# Patient Record
Sex: FEMALE
[De-identification: names, ages, dates, MRNs, and addresses within clinical notes are randomized per-mention and may not be internally consistent; named-entity substitution may affect disease eponyms.]

---

## 2019-01-18 ENCOUNTER — HOSPITAL ENCOUNTER (OUTPATIENT)
Dept: HOSPITAL 88 - CT | Age: 84
End: 2019-01-18
Attending: INTERNAL MEDICINE
Payer: MEDICARE

## 2019-01-18 DIAGNOSIS — R10.84: Primary | ICD-10-CM

## 2019-01-18 LAB
BUN SERPL-MCNC: 20 MG/DL (ref 7–26)
BUN/CREAT SERPL: 23 (ref 6–25)
EGFRCR SERPLBLD CKD-EPI 2021: > 60 ML/MIN (ref 60–?)

## 2019-01-18 PROCEDURE — 82565 ASSAY OF CREATININE: CPT

## 2019-01-18 PROCEDURE — 84520 ASSAY OF UREA NITROGEN: CPT

## 2019-01-18 PROCEDURE — 36415 COLL VENOUS BLD VENIPUNCTURE: CPT

## 2019-01-18 PROCEDURE — 74177 CT ABD & PELVIS W/CONTRAST: CPT

## 2019-01-18 NOTE — DIAGNOSTIC IMAGING REPORT
EXAMINATION: CT of the abdomen and pelvis with contrast.



TECHNIQUE: 

Spiral CT images of the abdomen and pelvis were performed from the lung bases

to the lesser trochanters after the intravenous administration of 100 cc of

Isovue-370 and the oral administration of water.  Coronal and sagittal

reformatted images were obtained.



COMPARISON:  None.



CLINICAL HISTORY:Abdominal pain. Patient reports history of appendectomy,

cholecystectomy, hysterectomy, and prior intestinal surgery.

     

DISCUSSION:



ABDOMEN/PELVIS:



LOWER THORAX:Patchy opacity in the right middle lobe and lower lobe, partially

visualized, likely representing atelectasis or scar.



HEPATOBILIARY: No focal hepatic lesions.  No intra-or extrahepatic biliary

ductal dilation.  The gallbladder has been removed. 



SPLEEN: No splenomegaly.



PANCREAS: No focal masses or ductal dilatation. 



ADRENALS: No adrenal nodules.



KIDNEYS/URETERS: Bilateral extrarenal pelves. No hydronephrosis or calculi. 4

cm exophytic hypoattenuating lesion projecting from the interpolar left kidney,

average internal attenuation 5-10 Hounsfield units compatible with a simple

cyst. Additional 1.6 cm simple cyst projecting from the lower pole of the left

kidney. Additional subcentimeter hypoattenuating lesions bilaterally, too small

to further characterize though likely to represent additional small cysts.



PELVIC ORGANS/BLADDER: The urinary bladder is collapsed and poorly evaluated.

The uterus is not identified and has presumably been removed. Peripherally

calcified nodular structure in the rectovesical space may represent a calcified

lymph node.



PERITONEUM/RETROPERITONEUM: No ascites. No pneumoperitoneum.



LYMPH NODES: No pelvic sidewall, retroperitoneal, or mesenteric

lymphadenopathy.



VESSELS: Atherosclerotic calcification of the abdominal aorta, major branch

vessels, and iliac arterial systems, with focal aneurysmal dilatation of the

infrarenal abdominal aorta (3.2 cm). Moderate SMA origin stenosis seen on

series 2 image 19. Celiac and NEELAM origins are patent. Portal vein, splenic

vein, and central superior mesenteric vein are patent.



GI TRACT: The large bowel is notable for multiple diverticula scattered along

the course of the sigmoid colon, without wall thickening or adjacent

inflammatory change. The appendix is not identified compatible with prior

appendectomy. There is no small bowel dilatation to suggest obstruction.



BONES AND SOFT TISSUE: No osseous destructive lesions. Degenerative disc

changes and facet arthropathy of the lumbar spine. Status post vertebral plasty

at L3. Age indeterminate moderate compression deformity of T12 with

approximately 50% loss of height and small amount of fragment retropulsion into

the spinal canal.  Postsurgical scar of the anterior abdominal wall. No

additional focal soft tissue abnormalities.



IMPRESSION: 



Age-indeterminate moderate compression deformity of T12 with superior endplate

retropulsion into the spinal canal, without significant spinal canal stenosis.

Correlate for point tenderness.



No acute intra-abdominal or pelvic CT abnormalities.



Large bowel diverticulosis without evidence of diverticulitis.



Atherosclerotic vascular disease with focal mild aneurysmal dilatation of the

infrarenal abdominal aorta (3.2 cm). Moderate SMA origin stenosis with widely

patent celiac and NEELAM origins.



Partially visualized patchy opacity in the right middle lobe may reflect

atelectasis or inflammatory process. CT scan of the chest without contrast

should be considered for further evaluation.



Signed by: Dr. Kimo Hatch M.D. on 1/18/2019 10:03 AM

## 2019-02-07 ENCOUNTER — HOSPITAL ENCOUNTER (OUTPATIENT)
Dept: HOSPITAL 88 - CT | Age: 84
End: 2019-02-07
Attending: INTERNAL MEDICINE
Payer: MEDICARE

## 2019-02-07 DIAGNOSIS — R91.8: Primary | ICD-10-CM

## 2019-02-07 PROCEDURE — 71250 CT THORAX DX C-: CPT

## 2019-02-07 NOTE — DIAGNOSTIC IMAGING REPORT
EXAMINATION: CT scan of the chest  without contrast.



TECHNIQUE: Spiral CT images of the chest were performed from the lung apices to

the level of the adrenal glands.  No intravenous contrast was administered per

referring physician request. Coronal and sagittal reformatted images were

obtained.



COMPARISON: CT abdomen and pelvis 1/18/2019



CLINICAL HISTORY:Right middle lobe opacity identified on CT abdomen and pelvis



DISCUSSION:   ABSENCE OF INTRAVENOUS CONTRAST DECREASES SENSITIVITY FOR

DETECTION OF FOCAL LESIONS AND VASCULAR PATHOLOGY.



LINES/TUBES:  None.



LUNGS AND AIRWAYS: Cluster of small nodules in a Y shaped configuration in the

right middle lobe corresponding to the abnormality identified on the comparison

CT abdomen and pelvis. Superolateral to this, also within the right lower lobe

is an additional cluster of nodular opacities in a tree-in-bud configuration. A

cluster of nodules in a similar configuration laterally within the left upper

lobe just lateral to the minor fissure seen on series 3 image 39. A similar

tubular/nodular opacity anteriorly within the left lower lobe seen on series 3

image 65. Trace groundglass opacity in the dependent portions of the lower

lobes compatible with subsegmental atelectasis. The trachea, mainstem bronchi,

and central lobar bronchi are patent.



PLEURA: No pneumothorax or pleural effusions.  



HEART AND MEDIASTINUM:  Visualized portions of the thyroid gland are normal.

There is borderline ectasia of the ascending thoracic aorta (3.8 cm). Great

vessel origins are normal in configuration, with marked tortuosity of the

central great vessels. Pulmonary outflow tract is of normal caliber. No

pericardial effusion. H   



LYMPH NODES: No axillary, hilar, or mediastinal lymphadenopathy.



ABDOMEN: Visualized portions of the liver, spleen, pancreas, and adrenals are

unremarkable.



BONES AND SOFT TISSUES: No osseous destructive lesions. Osteopenia with

multilevel degenerative disc disease of the thoracic spine. Anterior

compression deformity of T12 is again noted. Healed fracture deformities of

multiple left-sided ribs.



IMPRESSION: 



Clustered nodules in tree-in-bud/Y shaped and tubular configurations involving

the right middle lobe, right upper lobe, and left lower lobe. Differential

considerations include allergic bronchopulmonary aspergillosis (ABPA) as well

as atypical mycobacterial infection. Bronchial atresia or endobronchial

neoplasm is felt to be unlikely given the multifocality. Pulmonary consultation

for possible bronchoscopy is suggested.



Atherosclerotic vascular disease with borderline ectasia of the ascending

thoracic aorta (3.8 cm).



Signed by: Dr. Kimo Hatch M.D. on 2/7/2019 12:34 PM

## 2019-04-05 ENCOUNTER — HOSPITAL ENCOUNTER (OUTPATIENT)
Dept: HOSPITAL 88 - CT | Age: 84
End: 2019-04-05
Attending: INTERNAL MEDICINE
Payer: MEDICARE

## 2019-04-05 DIAGNOSIS — J42: Primary | ICD-10-CM

## 2019-04-05 DIAGNOSIS — K21.9: ICD-10-CM

## 2019-04-05 DIAGNOSIS — J98.4: ICD-10-CM

## 2019-04-05 PROCEDURE — 71250 CT THORAX DX C-: CPT

## 2019-04-08 NOTE — DIAGNOSTIC IMAGING REPORT
EXAM: CT Chest WITHOUT contrast 4/5/2019 4:21 PM

INDICATION:      

^[id number]

^[id number]

^CHRONIC BRONCHITIS

COMPARISON: CT chest 02/07/2019



TECHNIQUE:

Chest was scanned utilizing a multidetector helical scanner from the lung apex

through the level of the adrenal glands without administration of IV contrast.

Absence of intravenous contrast decreases sensitivity for detection of

lymphadenopathy and vascular pathology. Coronal and sagittal reformations were

obtained.  Routine protocol was performed.

           IV CONTRAST: None



COMPLICATIONS: None



RADIATION DOSE:

     Total DLP: 418.3 mGy*cm

     Estimated effective dose: (DLP x 0.015 x size factor) mSv

     CTDIvol has been reviewed. It is below the limits set by the Radiation

Protocol Committee (RPC).



FINDINGS:



LINES/ TUBES: None.



LUNGS AND AIRWAYS:  Persistent few bilateral small multifocal areas of

tree-in-bud and tubular pulmonary nodules, worse in the right middle lobe, and

mild central peribronchial wall thickening without bronchiectasis. No new

consolidations or suspicious pulmonary nodules.     



PLEURA: The pleural spaces are clear.



HEART AND MEDIASTINUM: The thyroid gland is normal.  Round fluid attenuation

structure in the left lower paratracheal region on series 2, image 47 is

unchanged and may reflect fluid in the pericardial recess. No mediastinal,

hilar or axillary lymphadenopathy.  Left atrium enlargement. There is no

pericardial effusion.  Mild coronary artery calcifications.  Borderline size of

the ascending thoracic aorta (3.8 cm). Mild scattered atherosclerotic

calcifications of the thoracic aorta. The main pulmonary artery is mildly

enlarged measuring 3.3 cm and suggestive of mild pulmonary hypertension.



UPPER ABDOMEN: Cholecystectomy. Suboptimally evaluated few small subcortical

left renal cysts.



BONES: Stable T12 compression deformity and  remote posttraumatic deformity of

a few left-sided ribs.



SOFT TISSUES: Unremarkable.



IMPRESSION: 

Stable bilateral few multifocal areas of tree-in-bud and tubular pulmonary

nodules, worse in the right middle lobe, suggestive of bronchopulmonary

aspergillosis (ABPA) or atypical mycobacterial infection. Consider correlation

with bronchoscopy.



No new consolidations or lymphadenopathy in the chest.



Signed by: Dr. Coreen Loza M.D. on 4/8/2019 7:34 AM

## 2019-12-06 ENCOUNTER — HOSPITAL ENCOUNTER (OUTPATIENT)
Dept: HOSPITAL 88 - CT | Age: 84
End: 2019-12-06
Attending: INTERNAL MEDICINE
Payer: MEDICARE

## 2019-12-06 DIAGNOSIS — J42: Primary | ICD-10-CM

## 2019-12-06 DIAGNOSIS — K21.9: ICD-10-CM

## 2019-12-06 DIAGNOSIS — J98.4: ICD-10-CM

## 2019-12-06 PROCEDURE — 71250 CT THORAX DX C-: CPT

## 2019-12-06 NOTE — DIAGNOSTIC IMAGING REPORT
CT of the chest, without contrast, 12/6/2019.    



History: Chronic bronchitis.



Comparison: 5 2019, 2/7/2019.



Technique: Multidetector CT scanning of the chest was performed from the level

of the thoracic inlet to the upper abdomen without IV or oral contrast. 



Dose reduction: The examination was performed according to departmental

dose-optimization program which includes automated exposure control, adjustment

of the mA and/or kV according to patient size and/or use of iterative

reconstruction technique.                            



Findings:

The visualized portions of the thyroid gland are unremarkable.



There is no axillary, mediastinal, or hilar adenopathy



The heart is stably enlarged. There is unchanged fluid density structure in the

lower left peritracheal region which may represent fluid within the pericardial

recess. The ascending thoracic aorta is ectatic measuring 3.8 cm in diameter.



The main pulmonary artery is stably prominent measuring 3.3 cm in diameter and

may be suggestive of underlying pulmonary hypertension.

The trachea and central airways are clear.



Unchanged tree-in-bud opacities are present in the right upper lobe and are

best appreciated on series 3 image 55 and in the right middle lobe which are

best appreciated on series 3 image 64.



No new consolidation or pulmonary nodules are identified.



There is no pleural effusion. There is no pneumothorax.



Limited evaluation of the upper abdomen is no focal hepatic lesions in the

visualized portions of the liver. The patient is status post cholecystectomy.



No acute osseous abnormalities are identified.



IMPRESSION:

1. Unchanged tree-in-bud opacities in the right upper and middle lobe. No new

consolidation or pulmonary nodules.

2. Ectasia of the ascending thoracic aorta, unchanged.

3.Mild prominence of the main pulmonary artery. Correlate with underlying

pulmonary hypertension.



Signed by: Stephen E Dreyer, MD on 12/6/2019 5:40 PM

## 2019-12-17 ENCOUNTER — HOSPITAL ENCOUNTER (OUTPATIENT)
Dept: HOSPITAL 88 - CT | Age: 84
End: 2019-12-17
Attending: INTERNAL MEDICINE
Payer: MEDICARE

## 2019-12-17 DIAGNOSIS — R55: Primary | ICD-10-CM

## 2019-12-17 DIAGNOSIS — Z91.81: ICD-10-CM

## 2019-12-17 DIAGNOSIS — Z79.01: ICD-10-CM

## 2019-12-17 PROCEDURE — 70450 CT HEAD/BRAIN W/O DYE: CPT

## 2019-12-17 NOTE — DIAGNOSTIC IMAGING REPORT
History:Fall, syncope

Comparison studies:None



Technique:

Axial images were obtained from the skull base to the vertex.

Coronal and sagittal images reconstructed from the axial data.

Intravenous contrast: None

Dose modulation, iterative reconstruction, and/or weight based adjustment of

the mA/kV was utilized to reduce the radiation dose to as low as reasonably

achievable.



Findings:



Scalp/skull: 

No abnormalities.



Extra-axial spaces: 

No masses.  No fluid collections.



Brain sulci: Mildly prominent.

Ventricles: Mild compensatory dilatation. No hydrocephalus.



Parenchyma: 

Few hypodensities in the supratentorial white matter are small vessel ischemic

changes. Small chronic lacunar infarct at the left lentiform nucleus. No

masses, hemorrhage, acute or chronic cortical vascular insults.



Sellar/suprasellar region: No abnormalities.

Craniocervical junction: Patent foramen magnum.  No Chiari one malformation.



Incidental findings:

Atherosclerotic calcifications in the carotid siphons .



Bilateral cataract surgery changes



Impression:



No acute abnormalities.



Chronic findings:

1.  Mild generalized volume loss.

2.  Mild supratentorial white matter small vessel ischemic changes.



Signed by: DR Shamar Silva M.D. on 12/17/2019 2:12 PM

## 2019-12-20 ENCOUNTER — HOSPITAL ENCOUNTER (EMERGENCY)
Dept: HOSPITAL 88 - ER | Age: 84
Discharge: HOME | End: 2019-12-20
Payer: MEDICARE

## 2019-12-20 VITALS — BODY MASS INDEX: 22.19 KG/M2 | HEIGHT: 60 IN | WEIGHT: 113 LBS

## 2019-12-20 VITALS — DIASTOLIC BLOOD PRESSURE: 65 MMHG | SYSTOLIC BLOOD PRESSURE: 127 MMHG

## 2019-12-20 DIAGNOSIS — I10: ICD-10-CM

## 2019-12-20 DIAGNOSIS — M19.031: Primary | ICD-10-CM

## 2019-12-20 DIAGNOSIS — I48.91: ICD-10-CM

## 2019-12-20 PROCEDURE — 29125 APPL SHORT ARM SPLINT STATIC: CPT

## 2019-12-20 PROCEDURE — 84550 ASSAY OF BLOOD/URIC ACID: CPT

## 2019-12-20 PROCEDURE — 73110 X-RAY EXAM OF WRIST: CPT

## 2019-12-20 PROCEDURE — 99284 EMERGENCY DEPT VISIT MOD MDM: CPT

## 2019-12-20 PROCEDURE — 36415 COLL VENOUS BLD VENIPUNCTURE: CPT

## 2019-12-20 NOTE — XMS REPORT
Patient Summary Document

                             Created on: 2019



JOCELYNE COLVIN

External Reference #: [id number]

: 1932

Sex: Female



Demographics







                          Address                   [address] [address] 

[place]  [address]

 

                          Home Phone                [phone]

 

                          Preferred Language        Unknown

 

                          Marital Status            Unknown

 

                          Adventist Affiliation     Unknown

 

                          Race                      Unknown

 

                                        Additional Race(s) 

 

 

                          Ethnic Group              Unknown





Author







                          Author                    [facility]

 

                          [facility]

 

                          Address                   Unknown

 

                          Phone                     Unavailable







Care Team Providers







                    Care Team Member Name    Role                Phone

 

                    OSVALDO BURT    Unavailable         Unavailable

 

                    HENRI TAYLOR    Unavailable         Unavailable

 

                    RADHA BURT    Unavailable         Unavailable







Problems

This patient has no known problems.



Allergies, Adverse Reactions, Alerts

This patient has no known allergies or adverse reactions.



Medications

This patient has no known medications.



Encounters







             Start Date/Time    End Date/Time    Encounter Type    Admission Type    Attending [facility]       Care Department     Encounter ID

 

        2019 11:54:00    2019 11:54:00    Outpatient                    [facility]    [facility]     [id number]







Results







           Test Description    Test Time    Test Comments    Text Results    Atomic Results    Result

 Comments

 

                CT BRAIN WO     2019 14:02:00                                                               

                                           [place] [address]    
 Patient Name: JOCELYNE COLVIN                                   MR #: 
[id number]                     : 1932                                  
Age/Sex: 87/F  Acct #: [id number]                              Req #: [id number]
[id number]  Adm Physician:                                                      
Ordered by: OSVALDO BURT MD                            Report #: [id number]   
    Location: CT                                      Room/Bed:                 
   
___________________________________________________________________________________________________
   Procedure: [id number] CT/CT BRAIN WO  Exam Date: 19                     
      Exam Time: 1339                                              REPORT 
STATUS: Signed    History:Fall, syncope   Comparison studies:None      Techniqu
e:   Axial images were obtained from the skull base to the vertex.   Coronal and
sagittal images reconstructed from the axial data.   Intravenous contrast: None 
 Dose modulation, iterative reconstruction, and/or weight based adjustment of   
the mA/kV was utilized to reduce the radiation dose to as low as reasonably   
achievable.      Findings:      Scalp/skull:    No abnormalities.      
Extra-axial spaces:    No masses.  No fluid collections.      Brain sulci: 
Mildly prominent.   Ventricles: Mild compensatory dilatation. No hydrocephalus. 
    Parenchyma:    Few hypodensities in the supratentorial white matter are 
small vessel ischemic   changes. Small chronic lacunar infarct at the left 
lentiform nucleus. No   masses, hemorrhage, acute or chronic cortical vascular 
insults.      Sellar/suprasellar region: No abnormalities.   Craniocervical 
junction: Patent foramen magnum.  No Chiari one malformation.      Incidental 
findings:   Atherosclerotic calcifications in the carotid siphons .      Bilate
ral cataract surgery changes      Impression:      No acute abnormalities.      
Chronic findings:   1.  Mild generalized volume loss.   2.  Mild supratentorial 
white matter small vessel ischemic changes.      Signed by: DR Shamar Silva M.D. on 2019 2:12 PM        Dictated By: SHAMAR LORENZANA MD  
Electronically Signed By: SHAMAR LORENZANA MD on 19 1412  Transcribed 
By: BRINDA on 19 141       COPY TO:   OSVALDO BURT MD              

 

                CT CHEST WO     2019 17:31:00                                                               

                                           [place] [address]    
 Patient Name: JOCELYNE COLVIN                                   MR #: 
[id number]                     : 1932                                  
Age/Sex: 87/F  Acct #: [id number]                              Req #: [id number]
[id number]  Adm Physician:                                                      
Ordered by: JOJO DACOSTA MD                            Report #: [id number]  
     Location: CT                                      Room/Bed:                
    
___________________________________________________________________________________________________
   Procedure: [id number] CT/CT CHEST WO  Exam Date: 19                     
      Exam Time: 1619                                              REPORT 
STATUS: Signed    CT of the chest, without contrast, 2019.          Histo
ry: Chronic bronchitis.      Comparison: 2019, 2019.      Technique: 
Multidetector CT scanning of the chest was performed from the level   of the 
thoracic inlet to the upper abdomen without IV or oral contrast.       Dose 
reduction: The examination was performed according to departmental   dose-
optimization program which includes automated exposure control, adjustment   of 
the mA and/or kV according to patient size and/or use of iterative   
reconstruction technique.                                  Findings:   The 
visualized portions of the thyroid gland are unremarkable.      There is no axil
jose manuel, mediastinal, or hilar adenopathy      The heart is stably enlarged. There 
is unchanged fluid density structure in the   lower left peritracheal region 
which may represent fluid within the pericardial   recess. The ascending 
thoracic aorta is ectatic measuring 3.8 cm in diameter.      The main pulmonary 
artery is stably prominent measuring 3.3 cm in diameter and   may be suggestive 
of underlying pulmonary hypertension.   The trachea and central airways are 
clear.      Unchanged tree-in-bud opacities are present in the right upper lobe 
and are   best appreciated on series 3 image 55 and in the right middle lobe 
which are   best appreciated on series 3 image 64.      No new consolidation or 
pulmonary nodules are identified.      There is no pleural effusion. There is no
pneumothorax.      Limited evaluation of the upper abdomen is no focal hepatic 
lesions in the   visualized portions of the liver. The patient is status post 
cholecystectomy.      No acute osseous abnormalities are identified.      
IMPRESSION:   1. Unchanged tree-in-bud opacities in the right upper and middle 
lobe. No new   consolidation or pulmonary nodules.   2. Ectasia of the ascending
thoracic aorta, unchanged.   3.Mild prominence of the main pulmonary artery. 
Correlate with underlying   pulmonary hypertension.      Signed by: Stephen E Dreyer, MD on 2019 5:40 PM        Dictated By: STEPHEN E DREYER MD  
Electronically Signed By: STEPHEN E DREYER MD on 19  Transcribed By: 
BRINDA on 19       COPY TO:   JOJO DACOSTA MD              

 

                CT CHEST WO     2019 07:25:00                                                               

                                           [place] [address]    
 Patient Name: JOCELYNE COLVIN                                   MR #: 
[id number]                     : 1932                                  
Age/Sex: 86/F  Acct #: [id number]                              Req #: [id number]
[id number]  Adm Physician:                                                      
Ordered by: JOJO TAYLOR MD                            Report #: [id number]  
     Location: CT                                      Room/Bed:                
    
___________________________________________________________________________________________________
   Procedure: [id number] CT/CT CHEST WO  Exam Date: 19                     
      Exam Time: 1643                                              REPORT 
STATUS: Signed    EXAM: CT Chest WITHOUT contrast 2019 4:21 PM   INDICATION:
         [id number]    1643    CHRONIC BRONCHITIS   COMPARISON: CT chest 
2019      TECHNIQUE:   Chest was scanned utilizing a multidetector helical
scanner from the lung apex   through the level of the adrenal glands without 
administration of IV contrast.   Absence of intravenous contrast decreases 
sensitivity for detection of   lymphadenopathy and vascular pathology. Coronal 
and sagittal reformations were   obtained.  Routine protocol was performed.     
        IV CONTRAST: None      COMPLICATIONS: None      RADIATION DOSE:        
Total DLP: 418.3 mGy*cm        Estimated effective dose: (DLP x 0.015 x size 
factor) mSv        CTDIvol has been reviewed. It is below the limits set by the 
Radiation   Protocol Committee (RPC).      FINDINGS:      LINES/ TUBES: None.   
  LUNGS AND AIRWAYS:  Persistent few bilateral small multifocal areas of   
tree-in-bud and tubular pulmonary nodules, worse in the right middle lobe, and  
mild central peribronchial wall thickening without bronchiectasis. No new   
consolidations or suspicious pulmonary nodules.           PLEURA: The pleural 
spaces are clear.      HEART AND MEDIASTINUM: The thyroid gland is normal.  
Round fluid attenuation   structure in the left lower paratracheal region on 
series 2, image 47 is   unchanged and may reflect fluid in the pericardial 
recess. No mediastinal,   hilar or axillary lymphadenopathy.  Left atrium 
enlargement. There is no   pericardial effusion.  Mild coronary artery 
calcifications.  Borderline size of   the ascending thoracic aorta (3.8 cm). 
Mild scattered atherosclerotic   calcifications of the thoracic aorta. The main 
pulmonary artery is mildly   enlarged measuring 3.3 cm and suggestive of mild 
pulmonary hypertension.      UPPER ABDOMEN: Cholecystectomy. Suboptimally 
evaluated few small subcortical   left renal cysts.      BONES: Stable T12 
compression deformity and  remote posttraumatic deformity of   a few left-sided 
ribs.      SOFT TISSUES: Unremarkable.      IMPRESSION:    Stable bilateral few 
multifocal areas of tree-in-bud and tubular pulmonary   nodules, worse in the 
right middle lobe, suggestive of bronchopulmonary   aspergillosis (ABPA) or 
atypical mycobacterial infection. Consider correlation   with bronchoscopy.     
No new consolidations or lymphadenopathy in the chest.      Signed by: Dr. Alicia Willson M.D. on 2019 7:34 AM        Dictated By: ALICIA WILLSON MD  Electronically Signed By: ALICIA WILLSON MD on 
19  Transcribed By: BRINDA on 19       COPY TO:   
JOJO TAYLOR MD, [facility]                 

 

                CT CHEST WO     2019 12:26:00                                                               

                                           [place] [address]    
 Patient Name: JOCELYNE COLVIN                                   MR #: 
[id number]                     : 1932                                  
Age/Sex: 86/F  Acct #: [id number]                              Req #: [id number]
[id number]  Adm Physician:                                                      
Ordered by: RADHA BURT MD                            Report #: [id number]   
    Location: CT                                      Room/Bed:                 
   
___________________________________________________________________________________________________
   Procedure: [id number] CT/CT CHEST WO  Exam Date: 19                     
      Exam Time: 1214                                              REPORT 
STATUS: Signed    EXAMINATION: CT scan of the chest  without contrast.      DG
HNIQUE: Spiral CT images of the chest were performed from the lung apices to   
the level of the adrenal glands.  No intravenous contrast was administered per  
referring physician request. Coronal and sagittal reformatted images were   
obtained.      COMPARISON: CT abdomen and pelvis 2019      CLINICAL 
HISTORY:Right middle lobe opacity identified on CT abdomen and pelvis      
DISCUSSION:   ABSENCE OF INTRAVENOUS CONTRAST DECREASES SENSITIVITY FOR   
DETECTION OF FOCAL LESIONS AND VASCULAR PATHOLOGY.      LINES/TUBES:  None.     
LUNGS AND AIRWAYS: Cluster of small nodules in a Y shaped configuration in the  
right middle lobe corresponding to the abnormality identified on the comparison 
 CT abdomen and pelvis. Superolateral to this, also within the right lower lobe 
 is an additional cluster of nodular opacities in a tree-in-bud configuration. A
  cluster of nodules in a similar configuration laterally within the left upper 
 lobe just lateral to the minor fissure seen on series 3 image 39. A similar   
tubular/nodular opacity anteriorly within the left lower lobe seen on series 3  
image 65. Trace groundglass opacity in the dependent portions of the lower   
lobes compatible with subsegmental atelectasis. The trachea, mainstem bronchi,  
and central lobar bronchi are patent.      PLEURA: No pneumothorax or pleural 
effusions.        HEART AND MEDIASTINUM:  Visualized portions of the thyroid 
gland are normal.   There is borderline ectasia of the ascending thoracic aorta 
(3.8 cm). Great   vessel origins are normal in configuration, with marked 
tortuosity of the   central great vessels. Pulmonary outflow tract is of normal 
caliber. No   pericardial effusion. H         LYMPH NODES: No axillary, hilar, 
or mediastinal lymphadenopathy.      ABDOMEN: Visualized portions of the liver, 
spleen, pancreas, and adrenals are   unremarkable.      BONES AND SOFT TISSUES: 
No osseous destructive lesions. Osteopenia with   multilevel degenerative disc 
disease of the thoracic spine. Anterior   compression deformity of T12 is again 
noted. Healed fracture deformities of   multiple left-sided ribs.      
IMPRESSION:       Clustered nodules in tree-in-bud/Y shaped and tubular 
configurations involving   the right middle lobe, right upper lobe, and left 
lower lobe. Differential   considerations include allergic bronchopulmonary 
aspergillosis (ABPA) as well   as atypical mycobacterial infection. Bronchial a
tresia or endobronchial   neoplasm is felt to be unlikely given the 
multifocality. Pulmonary consultation   for possible bronchoscopy is suggested. 
    Atherosclerotic vascular disease with borderline ectasia of the ascending   
thoracic aorta (3.8 cm).      Signed by: Dr. Gamal Cooney M.D. on 2019 
12:34 PM        Dictated By: GAMAL COONEY MD  Electronically Signed By: GAMAL COONEY MD on 19 1234  Transcribed By: BRINDA on 19 1234       COPY 
TO:   RADHA BURT MD                  

 

                CT ABDOMEN/PELVIS W    2019 09:51:00                                                       

                                                   [place] [address]      Patient Name: JOCELYNE COLVIN                                
  MR #: [id number]                     : 1932                          
        Age/Sex: 86/F  Acct #: [id number]                              Req #: 
[id number]  Adm Physician:                                                      
Ordered by: RADHA BURT MD                            Report #: [id number]   
    Location: CT                                      Room/Bed:                 
   
___________________________________________________________________________________________________
   Procedure: [id number] CT/CT ABDOMEN/PELVIS W  Exam Date: 19             
              Exam Time: 0936                                              
REPORT STATUS: Signed    EXAMINATION: CT of the abdomen and pelvis with contra
st.      TECHNIQUE:    Spiral CT images of the abdomen and pelvis were performed
from the lung bases   to the lesser trochanters after the intravenous 
administration of 100 cc of   Isovue-370 and the oral administration of water.  
Coronal and sagittal   reformatted images were obtained.      COMPARISON:  None.
     CLINICAL HISTORY:Abdominal pain. Patient reports history of appendectomy,  
cholecystectomy, hysterectomy, and prior intestinal surgery.           
DISCUSSION:      ABDOMEN/PELVIS:      LOWER THORAX:Patchy opacity in the right 
middle lobe and lower lobe, partially   visualized, likely representing 
atelectasis or scar.      HEPATOBILIARY: No focal hepatic lesions.  No intra-or 
extrahepatic biliary   ductal dilation.  The gallbladder has been removed.      
SPLEEN: No splenomegaly.      PANCREAS: No focal masses or ductal dilatation.   
   ADRENALS: No adrenal nodules.      KIDNEYS/URETERS: Bilateral extrarenal 
pelves. No hydronephrosis or calculi. 4   cm exophytic hypoattenuating lesion 
projecting from the interpolar left kidney,   average internal attenuation 5-10 
Hounsfield units compatible with a simple   cyst. Additional 1.6 cm simple cyst 
projecting from the lower pole of the left   kidney. Additional subcentimeter 
hypoattenuating lesions bilaterally, too small   to further characterize though 
likely to represent additional small cysts.      PELVIC ORGANS/BLADDER: The 
urinary bladder is collapsed and poorly evaluated.   The uterus is not 
identified and has presumably been removed. Peripherally   calcified nodular 
structure in the rectovesical space may represent a calcified   lymph node.     
PERITONEUM/RETROPERITONEUM: No ascites. No pneumoperitoneum.      LYMPH NODES: 
No pelvic sidewall, retroperitoneal, or mesenteric   lymphadenopathy.      
VESSELS: Atherosclerotic calcification of the abdominal aorta, major branch   
vessels, and iliac arterial systems, with focal aneurysmal dilatation of the   
infrarenal abdominal aorta (3.2 cm). Moderate SMA origin stenosis seen on   
series 2 image 19. Celiac and NEELAM origins are patent. Portal vein, splenic   
vein, and central superior mesenteric vein are patent.      GI TRACT: The large 
bowel is notable for multiple diverticula scattered along   the course of the 
sigmoid colon, without wall thickening or adjacent   inflammatory change. The 
appendix is not identified compatible with prior   appendectomy. There is no 
small bowel dilatation to suggest obstruction.      BONES AND SOFT TISSUE: No 
osseous destructive lesions. Degenerative disc   changes and facet arthropathy 
of the lumbar spine. Status post vertebral plasty   at L3. Age indeterminate 
moderate compression deformity of T12 with   approximately 50% loss of height 
and small amount of fragment retropulsion into   the spinal canal.  Postsurgical
scar of the anterior abdominal wall. No   additional focal soft tissue 
abnormalities.      IMPRESSION:       Age-indeterminate moderate compression 
deformity of T12 with superior endplate   retropulsion into the spinal canal, 
without significant spinal canal stenosis.   Correlate for point tenderness.    
 No acute intra-abdominal or pelvic CT abnormalities.      Large bowel 
diverticulosis without evidence of diverticulitis.      Atherosclerotic vascular
disease with focal mild aneurysmal dilatation of the   infrarenal abdominal 
aorta (3.2 cm). 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
origins.      Partially visualized patchy opacity in the right middle lobe may 
reflect   atelectasis or inflammatory process. CT scan of the chest without 
contrast   should be considered for further evaluation.      Signed by: Dr. Gamal Cooney M.D. on 2019 10:03 AM        Dictated By: GAMAL COONEY MD  
Electronically Signed By: GAMAL COONEY MD on 19 1003  Transcribed By: 
BRINDA on 19 1003       COPY TO:   RADHA BURT MD

## 2019-12-20 NOTE — DIAGNOSTIC IMAGING REPORT
TECHNIQUE: Frontal, oblique, and lateral views of the right wrist.



INDICATION: 87-year-old woman with pain and swelling.



COMPARISON: None.



FINDINGS:

Abnormal osseous structure adjacent to trapezium and second metacarpal.

Otherwise, no acute fractures or dislocations.

Degenerative changes at the first carpometacarpal joint. Other joint spaces are

within normal limits.

Calcifications in the region of the triangular fibrocartilage complex.

Soft tissues are grossly unremarkable.



IMPRESSION:

Abnormal osseous structure adjacent to the trapezium and second metacarpal may

represent an osteophyte or sequela of age-indeterminate trauma.



Degenerative changes at the first carpometacarpal joint.



Signed by: Joselin Lane MD on 12/20/2019 2:12 PM

## 2019-12-20 NOTE — XMS REPORT
Summary of Care

                             Created on: 10/10/2019



JOCELYNE COLVIN

External Reference #: [id number]

: [id number]

Sex: Female



Demographics







                          Address                   [id number] [place] [address] 

[place]  [address]

 

                          Preferred Language        English

 

                          Marital Status            Unknown

 

                          Congregational Affiliation     Unknown

 

                          Race                      Other Race

 

                          Ethnic Group              /Latin





Author







                          Author                    Lan SHAW,                Unknown

 

                          Address                   [facility]



 

                          Phone                     Unavailable







Care Team Providers







                    Care Team Member Name    Role                Phone

 

                    RENZO STEIN M.D.    Unavailable         Unavailable

 

                    WERNER RUIZ MD    Unavailable         Unavailable

 

                    JAVED CHOWDARY,  OSVALDO LAGUNAS    Unavailable         Unavailable

 

                    WERNER REYNA MD    Unavailable         Unavailable

 

                    BOLIVAR CHOWDARY,  ERICK SANTOS    Unavailable         Unavailable

 

                          Unavailable               Unavailable







Functional Status







                    Name                Dates               Details

 

                                        Functional status health issues are not documented

                                                    Status: 









                    Name                Dates               Details

 

                                        Cognitive status health issues are not documented

                                                    Status: 







Problems







                    Name                Dates               Details

 

                                        Bilateral leg edema (782.3, R60.0) 

                                                    Status: Active

 

                                        Varicose veins (454.9, I83.90) 

                                                    Status: Active

 

                                        Coronary artery disease (414.00, I25.10) 

                                                    Status: Active

 

                                        Essential (primary) hypertension (401.9, I10) 

                                                    Status: Active

 

                                        Hyperlipidemia (272.4, E78.5) 

                                                    Status: Active

 

                                        Sinus bradycardia (427.89, R00.1) 

                                                    Status: Active

 

                                        Headache (784.0, R51) 

                                                    Status: Active

 

                                        Dizziness (780.4, R42) 

                                                    Status: Active

 

                                        Paroxysmal atrial fibrillation (427.31, I48.0) 

                                                    Status: Active







Medications







                    Name                Dates               Details

 

                                        Nitroglycerin 0.4 MG Sublingual Tablet Sublingual

DESUELVA LATISHA (1) TABLETA DEBAJO DE LA LENGUA WATSON NECESARIO PARA DOLOR DE PECHO.



 

                                         Quantity: 25









RENZO STEIN M.D. * 





                                         Start : 2018





Active

Metoprolol Succinate ER 25 MG Oral Tablet Extended Release 24 Hour

TOME LATISHA (1) TABLETA(S) POR LA BOCA LATISHA VEZ AL TERRA AL ACOSTARSE.

* 





                           Quantity: 90              Refills: 1







* 





                                         Start : 2015





Active

Flecainide Acetate 100 MG Oral Tablet

TOME LATISHA (1) TABLETA(S) POR LA BOCA DOS VECES AL TERRA.

* 





                           Quantity: 180             Refills: 0









WERNER RUIZ MD * 





                                         Start : 2015





Active

amLODIPine Besylate 5 MG Oral Tablet

TAKE 1 TABLET IN THE AM THEN HALF A TAB IN THE PM

* 





                           Quantity: 90              Refills: 0









WERNER RUIZ MD * 





                                         Start : 2015





Active

Pantoprazole Sodium 40 MG Oral Tablet Delayed Release

TOME LATISHA (1) TABLETA(S) POR LA BOCA DOS VECES AL TERRA.

* 





                           Quantity: 180             Refills: 0









WERNER RUIZ MD * 





                                         Start : 14-Oct-2015





Active

Furosemide 40 MG Oral Tablet

TOME LATISHA (1) TABLETA(S) POR LA BOCA LATISHA VEZ AL TERRA EN LA MANANA. AS NEEDED

* 





                           Quantity: 90              Refills: 1







* 





                                         Start : 2016





Active

Compression Stocking

15-20 MMHD bilateral knee high

* 





                           Quantity: 30              Refills: 1









RENZO STEIN M.D. * 





                                         Start : 15-Elio-2016





Active

Jantoven 2 MG Oral Tablet

TOME LATISHA (1) TABLETA(S) POR LA BOCA DIARIO.

* 





                           Quantity: 90              Refills: 1









WERNER RUIZ MD * 





                                         Start : 7-Sep-2016





Active

Losartan Potassium 50 MG Oral Tablet

TOME LATISHA (1) TABLETA(S) POR LA BOCA DOS VECES AL TERRA.

* 





                           Quantity: 180             Refills: 2









WERNER RUIZ MD * 





                                         Start : 2018





Active

Atorvastatin Calcium 40 MG Oral Tablet

TAKE 1 TABLET BEDTIME

* 





                           Quantity: 90              Refills: 2









WERNER RUIZ MD * 





                                         Start : 23-Aug-2019





Active

Aspirin EC 81 MG Oral Tablet Delayed Release

TAKE 1 TABLET DAILY.

* 





                                         Refills: 0







Active





Allergies and Adverse Reactions







                    Name                Dates               Details

 

                    No Known Drug Allergies (Allergy)                        Status: Active









Past Medical History







                    Name                Dates               Details

 

                                        History of Atherosclerosis (440.9, I70.90) 

                                                    Status: Resolved

 

                                        History of Atrial fibrillation (427.31, I48.91) 

                                                    Status: Resolved

 

                                        History of Coronary Artery Disease (V12.59) 

                                                    Status: Resolved

 

                                        History of esophageal reflux (V12.79, Z87.19) 

                                                    Status: Resolved

 

                                        History of essential hypertension (V12.59, Z86.79) 

                                                    Status: Resolved

 

                                        History of hyperlipidemia (V12.29, Z86.39) 

                                                    Status: Resolved

 

                                        History of Osteoporosis (733.00, M81.0) 

                                                    Status: Resolved







Procedures







                    Procedure           Dates               Details

 

                    History of Hysterectomy                        Completed 



 

                    History of Cataract Surgery                        Completed 



 

                    History of Appendectomy                        Completed 



 

                    History of Cholecystectomy                        Completed 



 

                    History of Shoulder Surgery                        Completed 









Immunization







                    Name                Dates               Details

 

                                        Immunizations not documented

                                                     







Family History







                    Name                Dates               Details

 

                                        Family history of Heart Disease (V17.49) 

                                                    Status: Active

 

                                        Family history of Hypertension (V17.49) 

                                                    Status: Active







Social History







                    Name                Dates               Details

 

                                        -

                                                    Status: 









                    Name                Dates               Details

 

                    Former smoker                            

 

                    Current every day smoker                         







Vital Signs







                Date            Test            Result          Details

 

                                                                 

 

                                        20-Sep-201910:27

                    BP Systolic         179 mm[Hg]          Status: Comments: Location: RUE; Position: Sitting



 

                    BP Diastolic        81 mm[Hg]           Status: Comments: Location: RUE; Position: Sitting



 

                                                                 

 

                                        34-Fef-[id number]:21

                    BP Systolic         170 mm[Hg]          Status: Comments: Location: LUE; Position: Sitting



 

                    BP Diastolic        79 mm[Hg]           Status: Comments: Location: LUE; Position: Sitting



 

                    Height              60 in               Status: 



 

                    Weight              136 lb              Status: 



 

                    Body Mass Index Calculated    26.56 kg/m2         Status: 



 

                    Body Surface Area Calculated    1.58 m2             Status: 



 

                    Heart Rate          60 /min             Status: 









Results







                Date            Description     Value           Details

 

                    20-Sep-201913:30    [[facility]] PROTHROMBIN TIME-INR     Comments: Reference Range        

             0.9-1.1Moderate-intensity Warfarin Therapy 2.0-3.0Higher-intensity 
Warfarin Therapy   3.0-4.0  REPORT COMMENT:FASTING:NO



 

                                INR             2.4   (Above high threshold)    Comments: Reference Range                 

    0.9-1.1Moderate-intensity Warfarin Therapy 2.0-3.0Higher-intensity Warfarin 
Therapy   3.0-4.0



 

                                PT              23.7  {sec} (Above high threshold)    Range: 9.0-11.5

Comments: For more information on this test, go 
to:http://education.[employer].Ide[employer].me/faq/MBB653









Plan of Care







                    Name                Dates               Details

 

                                        Planned Observations 

 

                    Planned Goals not documented                         

 

                                        Planned Encounters 

 

                                        Appointment; WERNER RUIZ MD

                                        On: 18-Oct-2019 11:20









Interventions Provided

Medication Changes* Flecainide Acetate 100 MG Oral Tablet - Renew





Instructions







                    Name                Dates               Details

 

                                        Instructions not documented

                                                     







Encounters







                                        Appointment; RENZO STEIN M.D. 

Encounter Diagnosis: Problem not documented

                                        On: 28-Dec-2017 9:10



 

                                        Appointment; RENZO STEIN M.D. 

Encounter Diagnosis: Problem not documented

                                        On: 28-Dec-2017 9:15



 

                                        Appointment; RENZO STEIN M.D. 

Encounter Diagnosis: Problem not documented

                                        On: 2018 9:40



 

                                        Appointment; SE, ECHO 

Encounter Diagnosis: Problem not documented

                                        On: 2018 13:00



 

                                        Appointment; RENZO STEIN M.D. 

Encounter Diagnosis: Problem not documented

                                        On: 15-Feb-2018 13:20



 

                                        Appointment; SE, NUCLEAR 

Encounter Diagnosis: Problem not documented

                                        On: 20-Mar-2018 8:15



 

                                        Appointment; RENZO STEIN M.D. 

Encounter Diagnosis: Problem not documented

                                        On: 2018 11:00



 

                                        Appointment; RENZO STEIN M.D. 

Encounter Diagnosis: Problem not documented

                                        On: 5-Oct-2018 10:15



 

                                        Appointment; RENZO STEIN M.D. 

Encounter Diagnosis: Problem not documented

                                        On: 2019 14:00



 

                                        Appointment; SE, VENOUS 

Encounter Diagnosis: Problem not documented

                                        On: 2019 14:00



 

                                        Appointment; RENZO STEIN M.D. 

Encounter Diagnosis: Problem not documented

                                        On: 2019 8:30



 

                                        Appointment; WERNER RUIZ MD 

Encounter Diagnosis: Problem not documented

                                        On: 24-May-2019 9:30



 

                                        Appointment; WERNER RUIZ MD 

Encounter Diagnosis: Problem not documented

                                        On: 24-May-2019 9:40



 

                                        Appointment; SE, ECHO 

Encounter Diagnosis: Problem not documented

                                        On: 24-May-2019 11:00



 

                                        Appointment; MARICRUZ RAMIREZ 

Encounter Diagnosis: Problem not documented

                                        On: 2019 14:15



 

                                        Appointment; WERNER RUIZ MD 

Encounter Diagnosis: Problem not documented

                                        On: 23-Aug-2019 10:40



 

                                        Appointment; SE, ECHO 

Encounter Diagnosis: Problem not documented

                                        On: 27-Aug-2019 11:00



 

                                        Appointment; WERNER RUIZ MD 

Encounter Diagnosis: Problem not documented

                                        On: 20-Sep-2019 10:20

## 2020-06-29 ENCOUNTER — HOSPITAL ENCOUNTER (OUTPATIENT)
Dept: HOSPITAL 88 - RESP | Age: 85
End: 2020-06-29
Attending: INTERNAL MEDICINE
Payer: MEDICARE

## 2020-06-29 DIAGNOSIS — J42: ICD-10-CM

## 2020-06-29 DIAGNOSIS — K21.9: ICD-10-CM

## 2020-06-29 DIAGNOSIS — J98.4: ICD-10-CM

## 2020-06-29 DIAGNOSIS — R06.09: Primary | ICD-10-CM

## 2020-06-29 PROCEDURE — 94729 DIFFUSING CAPACITY: CPT

## 2020-06-29 PROCEDURE — 94060 EVALUATION OF WHEEZING: CPT

## 2020-06-29 PROCEDURE — 94727 GAS DIL/WSHOT DETER LNG VOL: CPT

## 2020-11-15 ENCOUNTER — HOSPITAL ENCOUNTER (EMERGENCY)
Dept: HOSPITAL 88 - ER | Age: 85
Discharge: HOME | End: 2020-11-15
Payer: MEDICARE

## 2020-11-15 VITALS — HEIGHT: 60 IN | BODY MASS INDEX: 22.19 KG/M2 | WEIGHT: 113 LBS

## 2020-11-15 DIAGNOSIS — I10: ICD-10-CM

## 2020-11-15 DIAGNOSIS — R53.83: ICD-10-CM

## 2020-11-15 DIAGNOSIS — Z95.5: ICD-10-CM

## 2020-11-15 DIAGNOSIS — G47.00: Primary | ICD-10-CM

## 2020-11-15 DIAGNOSIS — I48.91: ICD-10-CM

## 2020-11-15 LAB
ALBUMIN SERPL-MCNC: 3.5 G/DL (ref 3.5–5)
ALBUMIN/GLOB SERPL: 1 {RATIO} (ref 0.8–2)
ALP SERPL-CCNC: 72 IU/L (ref 40–150)
ALT SERPL-CCNC: 29 IU/L (ref 0–55)
ANION GAP SERPL CALC-SCNC: 12.4 MMOL/L (ref 8–16)
BACTERIA URNS QL MICRO: (no result) /HPF
BASOPHILS # BLD AUTO: 0.1 10*3/UL (ref 0–0.1)
BASOPHILS NFR BLD AUTO: 0.6 % (ref 0–1)
BILIRUB UR QL: NEGATIVE
BUN SERPL-MCNC: 18 MG/DL (ref 7–26)
BUN/CREAT SERPL: 19 (ref 6–25)
CALCIUM SERPL-MCNC: 9.1 MG/DL (ref 8.4–10.2)
CHLORIDE SERPL-SCNC: 103 MMOL/L (ref 98–107)
CLARITY UR: CLEAR
CO2 SERPL-SCNC: 24 MMOL/L (ref 22–29)
COLOR UR: YELLOW
DEPRECATED NEUTROPHILS # BLD AUTO: 6.8 10*3/UL (ref 2.1–6.9)
DEPRECATED RBC URNS MANUAL-ACNC: (no result) /HPF (ref 0–5)
EGFRCR SERPLBLD CKD-EPI 2021: 56 ML/MIN (ref 60–?)
EOSINOPHIL # BLD AUTO: 0.3 10*3/UL (ref 0–0.4)
EOSINOPHIL NFR BLD AUTO: 2.7 % (ref 0–6)
EPI CELLS URNS QL MICRO: (no result) /LPF
ERYTHROCYTE [DISTWIDTH] IN CORD BLOOD: 13.3 % (ref 11.7–14.4)
GLOBULIN PLAS-MCNC: 3.5 G/DL (ref 2.3–3.5)
GLUCOSE SERPLBLD-MCNC: 113 MG/DL (ref 74–118)
HCT VFR BLD AUTO: 30.9 % (ref 34.2–44.1)
HGB BLD-MCNC: 10.3 G/DL (ref 12–16)
KETONES UR QL STRIP.AUTO: NEGATIVE
LEUKOCYTE ESTERASE UR QL STRIP.AUTO: NEGATIVE
LYMPHOCYTES # BLD: 2.3 10*3/UL (ref 1–3.2)
LYMPHOCYTES NFR BLD AUTO: 22.6 % (ref 18–39.1)
MCH RBC QN AUTO: 29.7 PG (ref 28–32)
MCHC RBC AUTO-ENTMCNC: 33.3 G/DL (ref 31–35)
MCV RBC AUTO: 89 FL (ref 81–99)
MONOCYTES # BLD AUTO: 0.8 10*3/UL (ref 0.2–0.8)
MONOCYTES NFR BLD AUTO: 7.4 % (ref 4.4–11.3)
NEUTS SEG NFR BLD AUTO: 66.2 % (ref 38.7–80)
NITRITE UR QL STRIP.AUTO: NEGATIVE
PLATELET # BLD AUTO: 400 X10E3/UL (ref 140–360)
POTASSIUM SERPL-SCNC: 4.4 MMOL/L (ref 3.5–5.1)
PROT UR QL STRIP.AUTO: (no result)
RBC # BLD AUTO: 3.47 X10E6/UL (ref 3.6–5.1)
SODIUM SERPL-SCNC: 135 MMOL/L (ref 136–145)
SP GR UR STRIP: 1.01 (ref 1.01–1.02)
UROBILINOGEN UR STRIP-MCNC: 0.2 MG/DL (ref 0.2–1)
WBC #/AREA URNS HPF: (no result) /HPF (ref 0–5)

## 2020-11-15 PROCEDURE — 99284 EMERGENCY DEPT VISIT MOD MDM: CPT

## 2020-11-15 PROCEDURE — 36415 COLL VENOUS BLD VENIPUNCTURE: CPT

## 2020-11-15 PROCEDURE — 87086 URINE CULTURE/COLONY COUNT: CPT

## 2020-11-15 PROCEDURE — 81001 URINALYSIS AUTO W/SCOPE: CPT

## 2020-11-15 PROCEDURE — 83735 ASSAY OF MAGNESIUM: CPT

## 2020-11-15 PROCEDURE — 80053 COMPREHEN METABOLIC PANEL: CPT

## 2020-11-15 PROCEDURE — 85025 COMPLETE CBC W/AUTO DIFF WBC: CPT

## 2020-11-15 NOTE — XMS REPORT
Continuity of Care Document

                             Created on: 11/15/2020



JOCELYNE COLVIN

External Reference #: [id number]

: 1932

Sex: Female



Demographics





                          Address                   [address] 

[place]  [address]

 

                          Home Phone                [phone]

 

                          Preferred Language        English

 

                          Marital Status            Unknown

 

                          Hinduism Affiliation     Unknown

 

                          Race                      Unknown

 

                                        Additional Race(s) 





Other



Other



 

                          Ethnic Group              Unknown





Author





                          Author                    [facility]

t

 

                          Organization              [facility]

 

                          Address                   [address]

[place]  [address]



 

                          Phone                     Unavailable







Support





                Name            Relationship    Address         Phone

 

                LOY COLVIN    ECON            Unknown         [phone]







Care Team Providers





                    Care Team Member Name Role                Phone

 

                    JAVED CHOWDARY,  OSVALDO PCP                 [phone]

 

                    PURNIMA Perla  Attphys             [phone]

 

                    Pavel Montes Attphys             [phone]

 

                    AMANDA Payne Attphys             [phone]

 

                    HENRI TAYLOR    Attphys             Unavailable

 

                    AMANDA Ruiz  Attphys             [phone]

 

                    Maico Norman       Attphys             [phone]

 

                    ELISHA SALGUERO M.D. Attphys             Unavailable

 

                    Chaitanya Garnett Attphys             [phone]

[phone]

 

                    EDGAR RUIZ MD Attphys             Unavailable

 

                    SE,  ECHO           Attphys             Unavailable

 

                    AdrienneMushtaq hackett Arslan Attphys             [phone]

 

                    JOSELINE VARNER  Attphys             Unavailable

 

                    CAMPOS,  SOUHEIL    Attphys             Unavailable

 

                    MARICRUZ RAMIREZ      Attphys             Unavailable

 

                    RADHA CAMPOS    Attphys             Unavailable

 

                    RENZO ZAPATA M.D. Attphys             Unavailabl

e

 

                    SE,  VENOUS         Attphys             Unavailable

 

                    Kole Camposheil Attphys             [phone]

 

                    SE,  NUCLEAR        Attphys             Unavailable

 

                    Reinier Zapata Attphys             [phone]

 

                    LoveDerek Sukumar Attphys             [phone]

[phone]

 

                    Pavel Montes Adm[facility]             [phone]

 

                    Maico Norman       Admphys             [phone]

 

                    Mushtaq Deleon Admphys             [phone]

 

                    Kole Campos Admphys             [phone]







Payers





           Payer Name Policy Type Policy Number Effective Date Expiration Date JANNETH zuniga

 

           Miscellaneous Ppo            [id number]     2019 00:00:00          

  [facility]

 

           Medicare A & B            [id number] 1997-10-01 00:00:00            C

[facility]







Problems





           Condition Name Condition Details Condition Category Status     Onset 

Date Resolution

Date            Last Treatment Date Treating Clinician Comments        Source

 

                          ANTICOAGULATION                                   ANTI

COAGULATION                        

Active                        2020                                        
                                                       [facility]             
        Diagnosis Active  2020 00:00:00         2020 12:43:00       

          [facility]

 

                          WEAKNESS                                          WEAK

NESS                        Active         

              10/30/2020                                                        
                                       [facility]                     

Diagnosis Active    2020-10-30 00:00:00           2020-10-30 17:47:00           

          [facility]

 

                          DUAL CHAMBER PACEMAKER IMPLANT                        

 DUAL CHAMBER PACEMAKER 

IMPLANT                        Active                        10/05/2020         
                                                                                
     [facility]                     Diagnosis    Active       2020-10-05 00:00

:00              

2020-10-16 12:18:00                                         [facility]

 

                          LEFT HEART CATH                                   LEFT

 HEART CATH                        

Active                        2020                                        
                                                       [facility]             
        Diagnosis Active  2020 00:00:00         2020-10-06 13:24:00       

          [facility]

 

                          ANTICOAG                                          ANTI

COAG                        Active         

              2020                                                        
                                       [facility]                     

Diagnosis Active    2020 00:00:00           2020-08-10 09:22:00           

          [facility]

 

                          DYSPNEA ON EXERTION, CHEST PRESSURE                   

      DYSPNEA ON EXERTION,

CHEST PRESSURE                        Active                        2020  
                                                                                
            [facility]                     Diagnosis           Active         

     2020 

00:00:00                  2020 14:25:00                           [facility]

 

                          RAPID HEART RATE                                  RAPI

D HEART RATE                       

Active                        2020                                        
                                                       [facility]             
        Diagnosis Active  2020 00:00:00         2020 12:50:00       

          [facility]

 

                          CHEST PAIN                                        CHES

T PAIN                        Active     

                  05/15/2020                                                    
                                           [facility]                     

Diagnosis Active    2020-05-15 00:00:00           2020-05-15 08:55:00           

          [facility]

 

                          ACUTE CHEST PAIN                                  ACUT

E CHEST PAIN                       

Active                        05/15/2020                                        
                                                       [facility]             
        Diagnosis Active  2020-05-15 00:00:00         2020 11:28:00       

          [facility]

 

                          LEFT HEART CATH POSS PCI                          LEFT

 HEART CATH POSS PCI       

                Active                        2020                        
                                                                       [facility]                     Diagnosis    Active       2020 00:00:00     

         2020-06-15 

13:31:00                                                    [facility]

 

                          HYPOMAGNESEMIA, PALPITATIONS                         H

YPOMAGNESEMIA, 

PALPITATIONS                        Active                        2019    
                                                                                
           [facility]                     Diagnosis       Active           00:00:00 

                2019 07:39:00                                 [facility]

[facility]

 

                          AFIB                                              AFIB

                        Active                 

      2019                                                                
                               [facility]                     Diagnosis       

          Active

           2019 00:00:00            2019 21:25:00                   

    [facility]

 

                          DX: Z12.31/M81.0**NO PAIN,LUMPS OR DC*                

         DX: 

Z12.31/M81.0**NO PAIN,LUMPS OR DC*                        Active                
       2018                                                               
                                [facility]                     Diagnosis      

           

Active     2018 00:00:00            2018 15:46:00                   

    [facility]

 

                          FAL                                               FAL 

                       Active                   

    2018                                                                  
                             [facility]                     Diagnosis         

  Active              

2018 00:00:00              2018 13:30:00                           M

[facility]

 

                          FALL                                              FALL

                        Active                 

      2017                                                                
                               [facility]                     Diagnosis       

          Active

           2017 00:00:00            2018 11:38:00                   

    [facility]

 

                          DX:I65.29= OCCLUSION AND STENOSIS OF UNS              

           DX:I65.29= 

OCCLUSION AND STENOSIS OF UNS                        Active                     
  2016                                                                    
                           [facility]                     Diagnosis           

Active              

2016 00:00:00              2016 07:59:00                           M

[facility]

 

                          UTI                                               UTI 

                       Active                   

    2016                                                                  
                             [facility]                     Diagnosis         

  Active              

2016 00:00:00              2016 08:57:00                           M

[facility]

 

                          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              

           I86.8; VARICOSE

VEINS OF OTHER SPECIFIED                        Active                        
2016                                                                      
                         [facility]                     Diagnosis           Ac

tive              

2016 00:00:00              2016 10:51:00                           M

[facility]

 

                          R92.8=OTHER ABNORMAL AND INCONCLUSIVE FI              

           R92.8=OTHER 

ABNORMAL AND INCONCLUSIVE FI                        Active                      
 2016                                                                     
                          [facility]                     Diagnosis           A

ctive              

2016 00:00:00              2016-02-15 10:08:00                           M

erlinda Lam

 

                          ROUTINE                                           ROUT

INE                        Active           

            2016                                                          
                                      [facility]                     Diagnosis 

                

Active     2016 00:00:00            2016 09:55:00                   

    [facility]

 

       Pyelonephritis Pyelonephritis Problem Active 2014-12-15 00:00:00         

                    [facility]

 

                          SCREENING                                         SCRE

ENING                        Active       

                2014                                                      
                                          [facility]                     

Diagnosis Active    2014 00:00:00           2014 08:35:00           

          [facility]

 

                          UNK                                               UNK 

                       Active                   

    2013                                                                  
                              [facility]                     Diagnosis         

  Active              

2013 00:00:00              2013 15:42:00                           M

erlinda Lam

 

                          A-FIB WITH RVR                                    A-FI

B WITH RVR                        

Active                        2011                                        
                                                        [facility]             
        Diagnosis Active  2011 00:00:00         2011-11-15 15:34:00       

          [facility]

 

                          OTHER                                             OTHE

R                        Active               

        2011                                                              
                                  [facility]                     Diagnosis     

            

Active     2011 00:00:00            2011 23:53:00                   

    [facility]

 

                History of Coronary Artery Disease History of Coronary Artery Di

sease Problem         

Resolved                                                          [facility]

 

        History of Atherosclerosis History of Atherosclerosis Problem Resolved  

                                

                                        [facility]

 

             History of Atrial fibrillation History of Atrial fibrillation Probl

em      Resolved      

                                                                 [facility]

 

          History of esophageal reflux History of esophageal reflux Problem   Re

solved                       

                                                            [facility]

 

                History of essential hypertension History of essential hypertens

ion Problem         

Resolved                                                          [facility]

 

       History of hyperlipidemia History of hyperlipidemia Problem Resolved     

                               

[facility]

 

       History of Osteoporosis History of Osteoporosis Problem Resolved         

                           

[facility]

 

       Bilateral leg edema Bilateral leg edema Problem Active                   

                 [facility]

 

       Varicose veins Varicose veins Problem Active                             

       [facility]

 

       Coronary artery disease Coronary artery disease Problem Active           

                         

[facility]

 

                Essential (primary) hypertension Essential (primary) hypertensio

n Problem         Active

                                                                  [facility]

 

       Hyperlipidemia Hyperlipidemia Problem Active                             

       [facility]

 

       Sinus bradycardia Sinus bradycardia Problem Active                       

             [facility]

 

       Headache Headache Problem Active                                    [facility]

 

       Dizziness Dizziness Problem Active                                    [facility]

[facility]

 

           Paroxysmal atrial fibrillation Paroxysmal atrial fibrillation Problem

    Active                 

                                                                [facility]

 

       Ear noise/buzzing Ear noise/buzzing Problem Active                       

             [facility]

 

                          Pain in left hip                                  Pain

 in left hip                       

                                                                        
2017                                                 [facility]          
          Problem                         2017 06:19:18                 Me

geneva Lam

 

                          Hypertensive disorder, systemic arterial (disorder)   

                      

Hypertensive disorder, systemic arterial (disorder)                        
Resolved                                                Problem                 
      2020                                                [facility]    
                Problem Resolved                 2020 09:16:56            

     [facility]

 

                          Reflux (finding)                                  Refl

ux (finding)                       

Resolved                                                Problem                 
      2020                                                [facility]    
                Problem Resolved                 2020 09:16:56            

     [facility]

 

                          [D]Chest pain                                     [D]C

hest pain                        

Active                                                Problem                   
     2012                                                [facility]     
                Problem Active                  2012 08:38:44             

    [facility]

 

                          Atrial fibrillation                               Atri

al fibrillation                 

       Active                                                Problem            
            2012                                                [facility]                     Problem Active                  2012 08:38:4

4                 

[facility]

 

                          [D]Chest pain (context-dependent category)            

             [D]Chest pain

 (context-dependent category)                        Active                     
                           Problem                        2020            
                                    [facility]                     Problem    

               

Active                           2020 09:16:56                       Xiang Lam

 

                          Atrial fibrillation (disorder)                        

 Atrial fibrillation 

(disorder)                        Active                                        
        Problem                        2020                               
                 [facility]                     Problem      Active           

                      2020 

09:16:56                                                    [facility]

 

                          Hypertension                                      Hype

rtension                        Active 

                                                                       
2014                        well-controlled.                        [facility]                     Problem Active                  2014 17:03:

25                 

[facility]

 

                          Hyperlipidemia                                    Hype

rlipidemia                        

Active                                                                        
2014                                                [facility]         
            Problem Active                  2014 17:03:25                 

[facility]

 

                          Coronary Artery Disease                           Chandrika

nary Artery Disease         

               Active                                                           
             2014                        Stable with no angina            
            [facility]                     Problem      Active               

                  2014 

17:03:25                                                    [facility]

 

                          Paroxysmal Atrial Fibrillation                        

 Paroxysmal Atrial 

Fibrillation                        Active                                      
                                  2014                                    
            [facility]                     Problem      Active               

                  2014 

17:03:25                                                    [facility]

 

                          Osteoporosis                                      Oste

oporosis                        Active 

                                                                       
2013                                                [facility]         
            Problem Active                  2013 04:23:03                 

[facility]

 

                          Esophageal Reflux                                 Esop

hageal Reflux                     

   Active                                                                       
 2013                                                [facility]        
             Problem Active                  2013 04:23:03                

 [facility]

 

                          SCREEN MAMMOGRAM NEC                              SCRE

EN MAMMOGRAM NEC               

         Active                                                                 
                                                       [facility]             
        Diagnosis Active                  2014 08:35:00                 Me

[facility]

 

                          JOINT PAIN-SHLDER                                 JOIN

T PAIN-SHLDER                     

   Active                                                                       
                                                 [facility]                   
        Diagnosis Active                  2012 09:27:00                 Me

[facility]

 

                          ENCNTR SCREEN MAMMOGRAM FOR MALIGNANT NE              

           ENCNTR SCREEN 

MAMMOGRAM FOR MALIGNANT NE                        Active                        
                                                                                
                [facility]                     Diagnosis    Active            

                     2018

 15:46:00                                                   [facility]

 

                          OTH SYMPTOMS AND SIGNS INVOLVING THE CIR              

           OTH SYMPTOMS 

AND SIGNS INVOLVING THE CIR                        Active                       
                                                                                
                 [facility]                     Diagnosis    Active           

                      

2015 09:59:00                                         [facility]

 

                          OTH ABN AND INCONCLUSIVE FINDINGS ON DX               

          OTH ABN AND 

INCONCLUSIVE FINDINGS ON DX                        Active                       
                                                                                
                 [facility]                     Diagnosis    Active           

                      

2016-02-15 10:08:00                                         [facility]

 

                          E78.5                                             E78.

5                        Active               

                                                                                
                          [facility]                     Diagnosis    Active   

                              

2016 10:56:00                                         [facility]

 

                          HYPERLIPIDEMIA, UNSPECIFIED                         HY

PERLIPIDEMIA, UNSPECIFIED 

                       Active                                                   
                                                                      
[facility]                     Diagnosis Active                  2016 10:56

:00                 

[facility]

 

                          PAROXYSMAL ATRIAL FIBRILLATION                        

 PAROXYSMAL ATRIAL 

FIBRILLATION                        Active                                      
                                                                                
  [facility]                     Diagnosis Active                  2016 

10:56:00                 

[facility]

 

                          BRADYCARDIA, UNSPECIFIED                          RICHY

YCARDIA, UNSPECIFIED       

                 Active                                                         
                                                                [facility]     
                Diagnosis Active                  2016 10:56:00           

      [facility]

 

                          VARICOSE VEINS OF OTHER SPECIFIED SITES               

          VARICOSE VEINS 

OF OTHER SPECIFIED SITES                        Active                          
                                                                                
              [facility]                     Diagnosis    Active              

                   2016 

10:30:00                                                    [facility]

 

                          PAIN IN UNSPECIFIED LIMB                          PAIN

 IN UNSPECIFIED LIMB       

                 Active                                                         
                                                               [facility]     
                Diagnosis Active                  2016 10:51:00           

      [facility]

 

                          OCCLUSION AND STENOSIS OF UNSPECIFIED CA              

           OCCLUSION AND 

STENOSIS OF UNSPECIFIED CA                        Active                        
                                                                                
                [facility]                     Diagnosis    Active            

                     2016

 07:59:00                                                   [facility]

 

                          LOW BACK PAIN                                     LOW 

BACK PAIN                        

Active                                                                          
                                              [facility]                     

Diagnosis Active                        2017 10:33:00                     

[facility]

 

                          PLEURODYNIA                                       PLEU

RODYNIA                        Active   

                                                                                
                                     [facility]                     Diagnosis 

                

Active                           2017 10:33:00                       Xiang Lam

 

                          HISTORY OF FALLING                                HIST

ORY OF FALLING                   

     Active                                                                     
                                                   [facility]                 
        Diagnosis Active                  2017 10:33:00                 [facility]

[facility]

 

                          PAIN IN LEFT HIP                                  PAIN

 IN LEFT HIP                       

 Active                                                                         
                                               [facility]                     

Diagnosis Active                        2017 10:33:00                     

[facility]

 

                          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              

           AGE-RELATED 

OSTEOPOROSIS W/O CURRENT PAT                        Active                      
                                                                                
                  [facility]                     Diagnosis    Active          

                       

2018 15:46:00                                         [facility]

 

                          HYPOMAGNESEMIA                                    HYPO

MAGNESEMIA                        

Active                                                                          
                                              [facility]                     

Diagnosis Active                        2019 07:39:00                     

[facility]

 

                          PALPITATIONS                                      PALP

ITATIONS                        Active 

                                                                                
                                       [facility]                     

Diagnosis Active                        2019 07:39:00                     

[facility]

 

                          ANGINA PECTORIS, UNSPECIFIED                         A

NGINA PECTORIS, 

UNSPECIFIED                        Active                                       
                                                                                
 [facility]                     Diagnosis Active                  2020-06-15 1

3:31:00                 

[facility]

 

                          Fever, unspecified                                Feve

r, unspecified                   

                             10/30/2020                                         
       2020                                                 [facility]   
                  Problem                   2020-10-30 17:00:00 2020 09:16

:56 2020 

09:16:56                                                    [facility]

 

                          Other fatigue                                     Othe

r fatigue                             

                   10/30/2020                                                
2020                                                 [facility]          
             Problem                   2020-10-30 17:00:00 2020 09:16:56 2

 09:16:56

                                                            [facility]

 

                          Weakness                                          Weak

ness                                       

         2020   
                                              [facility]                     

Problem             2020 18:00:00 2020 22:55:24 2020 22:55:24 

                    

[facility]

 

                          Encounter for screening mammogram for malignant neopla

sm of breast              

           Encounter for screening mammogram for malignant neoplasm of breast   
                                             2018                                               
 [facility]                     Problem                         2018 04:

35:21 2019 

11:11:14        2019 11:11:14                                 Kevin cox

 

                          Unspecified fall, initial encounter                   

      Unspecified fall, 

initial encounter                                                2018                           
                      [facility]                     Problem                   

              2018 

05:00:00     2018 02:47:06 2018 02:47:06                           [facility] Lam

 

                                        Multiple fractures of ribs, unspecified 

side, initial encounter for closed 

fracture                                                        Multiple fractur

es of ribs, unspecified side, 

initial encounter for closed fracture                                           
     2018       
                                         [facility]                     

Problem             2018 05:00:00 2018 02:47:06 2018 02:47:06 

                    

[facility] Lam

 

                          Urinary tract infection, site not specified           

              Urinary 

tract infection, site not specified                                             
   2018         
                                        [facility]                     Problem 

                  

           2018 05:00:00 2018 02:47:06 2018 02:47:06          

             Kevin Lam

 

                          Cervicalgia                                       Cerv

icalgia                                 

               2017                                                [facility]          
             Problem                   2017 06:00:00 2017 03:59:02 2

 03:59:02

                                                            Kevin Lam

 

                          Contusion of unspecified part of head, initial encount

er                        

 Contusion of unspecified part of head, initial encounter                       
                         2017                                                 [facility]       
              Problem                   2017 06:00:00 2017 03:59:02 

2017 

03:59:02                                                    [facility]

 

                          Discharge Diagnosis: Acute lower UTI                  

       Discharge 

Diagnosis: Acute lower UTI                                                
2016            
                                     [facility]                     Problem    

                             

2016 05:00:00 2016 03:00:31 2016 03:00:31                     

      Kevin Lam







Allergies, Adverse Reactions, Alerts





        Allergy Name Allergy Type Status  Severity Reaction(s) Onset Date Inacti

ve Date 

Treating Clinician        Comments                  Source

 

       No Known Medication Allergies No Known Medication Allergies Active       

                                    

[facility]

 

       No Known Drug Allergies No Known Drug Allergies Active                   

                        [facility]







Family History





           Family Member Diagnosis  Comments   Start Date Stop Date  Source

 

           Sister     Family history of Heart Disease                           

       [facility]

 

           Sister     Family history of Hypertension                            

      [facility]

 

           Unknown Family Member Family History            2013 00:36:22 2

 00:36:22 

[facility]







Social History





           Social Habit Start Date Stop Date  Quantity   Comments   Source

 

           Social History 2014 17:03:25 2014 17:03:25               

        [facility]







                Smoking Status  Start Date      Stop Date       Source

 

                Ex-smoker (finding)                                 [facility]

 

                Smokes tobacco daily (finding)                                 [facility]







Medications





             Ordered Medication Name Filled Medication Name Start Date   Stop Da

te    Current 

Medication? Ordering Clinician Indication Dosage     Frequency  Signature (SIG) 

Comments                  Components                Source

 

       Acetaminophen        2020-10-31 01:11:00        Yes                      

          Notes: Do not exceed 4 gm/day.

  (Same as: Tylenol)                                         [facility]

 

                                        Acetaminophen 300 MG / Codeine Phosphate

 30 MG Oral Tablet [Tylenol with Codeine

 #3]            2020-10-18 07:04:00         No                                  

    Notes: Do not exceed 4gm/day of 

acetaminophen.  (Same as: Tylenol with Codeine # 3)                             

            [facility]

 

                                        Acetaminophen 300 MG / Codeine Phosphate

 30 MG Oral Tablet [Tylenol with Codeine

 #3]            2020-10-18 01:09:00         No                                  

    Notes: Do not exceed 4gm/day of 

acetaminophen.  (Same as: Tylenol with Codeine # 3)                             

            [facility]

 

     Bumex      2020-10-17 14:00:00      No                       Notes: (Same A

s: Bumex)           [facility]

 

     clopidogrel      2020-10-17 14:00:00      No                       Notes: (

Same As: Plavix)           

[facility]

 

     Amlodipine      2020-10-17 02:00:00      No                       Notes: (S

[place] as: Norvasc)           

[facility]

 

     atorvastatin      2020-10-17 02:00:00      No                       Notes: 

(Same as: Lipitor)           

[facility]

 

     Flecainide      2020-10-17 02:00:00      No                       Notes: (S

[place] as: Tambocor)           

[facility]

 

     Losartan      2020-10-17 02:00:00      No                       Notes: ([place]

e as: Cozaar)           

[facility]

 

       pantoprazole        2020-10-16 21:30:00        No                        

         Notes: Tablet should not be 

chewed or crushed. (Same as: Protonix)                                         [facility]

[facility]

 

                                        influenza virus vaccine, inactivated hig

h-dose preservative-free intramuscular 

suspension         2020-10-16 17:46:19         No                               

       Notes: (Same as: Fluzone High-Dose

Quad)   For 65 years of age of older (0.7 ml IM) Shake well before use          

                               

[facility]

 

       Tylenol        2020-10-16 17:01:00        No                             

    Notes: Max acetaminophen 4000 mg/day 

(4 gm/day).  (Same as: Tylenol Extra Strength)                                  

       [facility]

 

      Morphine       2020-10-16 16:49:00       No                            Not

es: (Same as:MORPhine Sulfate)       

                                        [facility]

 

       Minocycline        2020-10-16 16:00:00        No                         

        Notes: (Same as:Minocin)  No 

milk/antacids/iron.                                         [facility]

 

       minocycline 100 mg oral capsule        2020-10-16 15:29:00        Yes    

                            100 mg, PO, 

[id number], # 24 tab, 0 Refill(s), Pharmacy: [id number], 152.4, cm, 
10/16/20 6:58:00 CDT, Height, 58.182, kg, 10/16/20 6:58:00 CDT, Weight          

                               

[facility]

 

       Sodium Chloride 0.9% IV 1,000 mL        2020-10-02 15:15:00        No    

                             1,000 mL, 

Rate: 100 ml/hr, Infuse over: 10 hr, Route: IV, Dosing Weight 58.182 kg, Total 
Volume: 1,000, Start date: 10/02/20 10:15:00 CDT, Duration: 10 hr, Stop date: 
10/02/20 20:14:00 CDT, 1.59, m2, 0                                         [facility]

 

       Acetaminophen        2020-10-02 15:15:00        No                       

          Notes: Do not exceed 4 gm/day. 

(Same as: Tylenol)                                          [facility]

 

      Morphine       2020-10-02 15:15:00       Yes                           Not

es: (Same as:MORPhine Sulfate)  

                                                    [facility]

 

       Sodium Chloride 0.9% (Bolus) IV        2020-10-02 12:32:00        Yes    

                            174 mL, 100 

ml/hr, Infuse Over: 1.7 hr, Route: IV, 174, Drug form: INJ, ONCE, Dosing Weight 
58.182 kg, Start date: 10/02/20 7:32:00 CDT, Stop date: 10/02/20 7:32:00 CDT, 0 

                                                    [facility]

 

       Ventolin HFA        2020-10-02 12:15:00        Yes                       

         2 puff, INHALATION, Q6H, 0 

Refill(s)                                                   [facility]

 

     Bumex      2020-10-02 12:15:00      Yes                      1 mg, PO, Bobo

y, 0 Refill(s)           

[facility]

 

       Aspirin 81 MG Enteric Coated Tablet        2020 14:00:00        No 

                                Notes: Do

not crush or chew. (Same As: Ecotrin)                                         [facility]

 

     Flecainide      2020 14:00:00      No                       Notes: (S

[place] as: Tambocor)           

[facility]

 

                    24 HR Metoprolol Tartrate 25 MG Extended Release Tablet [Top

rol]                     2020 

14:00:00        No                                 Notes: (Same as: Toprol XL) D

o Not Crush               [facility]

 

       pantoprazole        2020 14:00:00        No                        

         Notes: Tablet should not be 

chewed or crushed.                                          [facility]

 

      Simethicone       2020 14:00:00       No                            

Notes: (Same as: Mylanta Gas)        

                                        [facility]

 

     clopidogrel      2020 04:00:00      No                       Notes: (

Same As: Plavix)           

[facility]

 

     Amlodipine      2020 03:30:00      No                       Notes: (S

[place] as: Norvasc)           

[facility]

 

     atorvastatin      2020 03:30:00      No                       Notes: 

(Same as: Lipitor)           

[facility]

 

     Losartan      2020 03:30:00      No                       Notes: ([place]

e as: Cozaar)           

[facility]

 

       Nitroglycerin 0.4 MG Sublingual Tablet        2020 03:07:00        

No                                 Notes:

 (Same as:Nitroquick, Nitrostat) "Do Not Crush"  Sublingual tablet              

                           [facility]

 

       Gas-X Maximum Strength        2020 01:30:00        No              

                   Notes: (Same as: 

Mylicon)                                                    [facility]

 

       Coumadin        2020 01:15:00        No                            

     Notes: Nurse to ensure documentation

 of patient education per anticoagulation policy. Avoid large intake of vitamin-
K containing foods diet. (Same As: Coumadin)  WASTE: F/P - P Waste Black; E - P 
Waste Black  Hazardous Drug Group 3:Reproductive risk Hazardous Drug -- Refer to
 safe handling procedure [employer]PE [employer]                                         [facility]

[facility]

 

       Aspirin        2020 15:57:00        No                             

    325 mg, Route: PO, Drug form: TAB, 

ONCE, Dosing Weight 56.818, kg, Priority: STAT, Start date: 20 10:57:00 
CDT, Stop date: 20 10:57:00 CDT                                         [facility]

 

       simethicone 125 mg oral tablet, chewable        2020 00:13:00      

  Yes                                125

 mg = 1 tab, CHEW, QID, X 12 day, # 48 tab, 0 Refill(s), Pharmacy: [facility] 
[id number]                                                 Kevin Lam

 

       clopidogrel 75 mg oral tablet        2020 00:12:00        Yes      

                          75 mg = 1 tab,

 PO, Daily, # 60 tab, 0 Refill(s), Pharmacy: [id number]           

                              

[facility]

 

       Aspirin 81 MG Enteric Coated Tablet        2020 23:11:00        Yes

                                81 mg = 

1 tab, PO, Daily, 0 Refill(s)                                         [facility]

 

       clopidogrel 75 mg oral tablet        2020 23:11:00        No       

                          75 mg = 1 tab, 

PO, Daily, 0 Refill(s)                                         [facility]

 

     Simethicone      2020 14:33:00      No                       Notes: (

Same as: Mylicon)           

[facility]

 

     Simethicone      2020 14:23:00      No                       Notes: (

Same as: Mylicon)           

[facility]

 

       Aspirin 81 MG Enteric Coated Tablet        2020 14:00:00        No 

                                81 mg, 1 

tab, Route: PO, Drug form: ECTAB, Daily, Dosing Weight 50.909, kg, Start date: 
20 9:00:00 CDT, Duration: 30 day, Stop date: 20 9:00:00 CDT, 0      

                                   

[facility]

 

     clopidogrel      2020 14:00:00      No                       Notes: (

Same As: Plavix)           

[facility]

 

     Amlodipine      2020 02:00:00      No                       Notes: (S

[place] as: Norvasc)           

[facility]

 

     atorvastatin      2020 02:00:00      No                       Notes: 

(Same as: Lipitor)           

[facility]

 

     Flecainide      2020 02:00:00      No                       Notes: (S

[place] as: Tambocor)           

[facility]

 

     Losartan      2020-05-15 22:00:00      No                       Notes: ([place]

e as: Cozaar)           

[facility]

 

       pantoprazole        2020-05-15 22:00:00        No                        

         40 mg, 1 tab, Route: PO, Drug 

form: ECTAB, BID, Dosing Weight 50.909, kg, Start date: 05/15/20 17:00:00 CDT, 
Duration: 30 day, Stop date: 20 9:00:00 CDT, 0                            

             [facility]

 

       Warfarin        2020-05-15 22:00:00        No                            

     Notes: Nurse to ensure documentation

 of patient education per anticoagulation policy. Avoid large intake of vitamin-
K containing foods diet. (Same As: Coumadin)  WASTE: F/P - P Waste Black; E - P 
Waste Black  Hazardous Drug Group 3:Reproductive risk Hazardous Drug -- Refer to
 safe handling procedure PPE Matrix                                         Ricardo Lam

 

       ***Waiting for INR results***        2020-05-15 20:00:00        No       

                          ***Waiting for 

INR results***, ***Waiting for INR results***, Drug form: MISC, Route: MISC, 
ONCALL, 05/15/20 15:00:00 CDT, Duration: 30 day, Stop date: 20 14:59:00 
CDT, 0                                                      [facility]

 

       Sodium Chloride 0.9% IV 1,000 mL        2020-05-15 19:45:00        No    

                             1,000 mL, 

Rate: 100 ml/hr, Infuse over: 10 hr, Route: IV, Dosing Weight 50.909 kg, Total 
Volume: 1,000, Start date: 05/15/20 14:45:00 CDT, Duration: 10 hr, Stop date: 
20 0:44:00 CDT, 1.48, m2, 0                                         [facility]io hamilton Genaro

 

       Nitroglycerin        2020-05-15 19:45:00        No                       

          0.4 mg, 1 tab, Route: SL, Drug 

form: TAB, Q5Min, Dosing Weight 50.909, kg, PRN Chest Pain, Start date: 05/15/20
 14:45:00 CDT, Duration: 3 doses or times, Stop date: Limited # of times, 0     

                                    

Kevin Lam

 

                                        Albuterol 0.833 MG/ML / Ipratropium Brom

luis 0.167 MG/ML Inhalant Solution 

[DuoNeb]       2020-05-15 14:15:00       No                            Notes: (S

[place] as: Duoneb)             

Kevin Lam

 

       Dextrose 50% Syringe (D50W)        2020-05-15 13:54:00        No         

                        12.5 gm, 25 mL, 

Route: IVP, Drug Form: INJ, Dosing Weight 59.091, kg, PRN, PRN Blood Glucose 
Results, Start date: 05/15/20 8:54:00 CDT, Duration: 30 day, Stop date: 20
 8:53:00 CDT, 0                                             Kevin Lam

 

       Glucagon        2020-05-15 13:54:00        No                            

     1 mg, Route: IM, Drug form: PDR/INJ,

 PRN, Dosing Weight 59.091, kg, PRN Blood Glucose Results, Start date: 05/15/20 
8:54:00 CDT, Duration: 30 day, Stop date: 20 8:53:00 CDT, 0               

                          Kevin Lam

 

       Ondansetron        2020-05-15 13:54:00        No                         

        Notes: (Same as: Zofran)   *** 

MEDICATION WASTE *** Product Size:  4 mg Product Wasted:  ___ mg                

                         Kevin Lam

 

       normal saline 0.9% IV 1,000 mL        2020-05-15 13:25:00        No      

                           1,000 mL, 

Rate: 100 ml/hr, Infuse over: 10 hr, Route: IV, Dosing Weight 59.091 kg, Total 
Volume: 1,000, Start date: 05/15/20 8:25:00 CDT, Duration: 30 day, Stop date: 
20 8:24:00 CDT, 1.6, m2, 0                                         [facility]

[facility]

 

       Saline Flush 0.9%        2020-05-15 11:49:00        No                   

              Notes: (Same as: BD 

Posiflush)                                                  Kevin Lam

 

       Sodium Chloride 0.9% (Bolus) IV        2020 19:54:00        No     

                            1,000 mL, 

1000 ml/hr, Infuse Over: 1 hr, Route: IV, 1,000, Drug form: INJ, ONCE, Priority:
 STAT, Dosing Weight 81.818 kg, Start date: 20 13:54:00 CST, Stop date: 
20 13:54:00 CST, 0                                         [facility]

n

 

       Ondansetron        2020 19:54:00        No                         

        Notes: (Same as: Zofran)   *** 

MEDICATION WASTE *** Product Size:  4 mg Product Wasted:  ___ mg                

                         [facility]

 

     atorvastatin      2019 03:00:00      No                       Notes: 

(Same as: Lipitor)           

[facility]

 

       Warfarin        2019 23:00:00        No                            

     Notes: Nurse to ensure documentation

 of patient education per anticoagulation policy. Avoid large intake of vitamin-
K containing foods diet. (Same As: Coumadin)  WASTE: F/P - P Waste Black; E - P 
Waste Black                                                 [facility]

 

                                        Calcium Carbonate 1500 MG / Cholecalcife

rol 800 UNT Oral Tablet [Caltrate Plus D

 600/800]         2019 17:38:00         Yes                               

      1 tab, PO, BID, # 60 tab, 0 

Refill(s), Pharmacy: [facility]                                   

      [facility]

 

       magnesium oxide 400 mg oral tablet        2019 17:38:00        Yes 

                               400 mg = 

1 tab, PO, Daily, X 30 day, # 30 tab, 1 Refill(s), Pharmacy: [facility] 
[facility]                                                 [facility]

 

       Magnesium Sulfate        2019 16:20:00        No                   

              Notes: WASTE: F/P - Sink; E

- [facility]                                         [facility]

 

     Amlodipine      2019 15:00:00      No                       Notes: (S

[place] as: Norvasc)           

[facility]

 

     Losartan      2019 15:00:00      No                       Notes: (Germ[place]

e as: Cozaar)           

[facility]

 

                metoprolol succinate 25 mg oral capsule, extended release       

          2019 15:00:00 

           No                                                     metoprolol suc

cinate 25 mg oral capsule, extended release, 25 mg, 

Route: PO, Daily, 19 9:00:00 CST, Duration: 30 day, Stop date: 20 
9:00:00 CST                                                 [facility]

 

       pantoprazole        2019 15:00:00        No                        

         Notes: Tablet should not be 

chewed or crushed. (Same as: Protonix)                                         [facility]

[facility] [place]

 

       metoprolol        2019 15:00:00        No                          

       Notes: (Same as: Toprol XL) Do Not

Crush                                                       [facility]

 

     Flecainide      2019 06:35:00      No                       Notes: (S

[place] as: Tambocor)           

[facility]

 

     Amlodipine      2019 06:34:00      No                       Notes: (S

[place] as: Norvasc)           

[facility]

 

       amLODIPine 2.5 mg oral tablet        2019 04:31:00        Yes      

                          2.5 mg = 1 

tab, PO, Bedtime, 0 Refill(s)                                         [facility]

 

          calcium gluconate + Sodium Chloride 0.9%  mL           2019

8 04:14:00           No                  

                                 Notes: WASTE: F/P - Sink; E - [facility] Trash [facility]

in                       [facility]

 

       Dextrose 50% Syringe (D50W)        2019 03:24:00        No         

                        12.5 gm, 25 mL, 

Route: IVP, Drug Form: INJ, Dosing Weight 59.091, kg, PRN, PRN Blood Glucose 
Results, Start date: 19 21:24:00 CST, Duration: 30 day, Stop date: 
20 21:23:00 CST, 0                                         [facility]

[facility]

 

       Glucagon        2019 03:24:00        No                            

     1 mg, Route: IM, Drug form: PDR/INJ,

PRN, Dosing Weight 59.091, kg, PRN Blood Glucose Results, Start date: 19 
21:24:00 CST, Duration: 30 day, Stop date: 20 21:23:00 CST, 0             

                            [facility]

 

       Acetaminophen        2019 03:24:00        No                       

          Notes: Do not exceed 4 gm/day. 

(Same as: Tylenol)                                          [facility]

 

       Calcium Gluconate        2019 03:24:00        No                   

              3,000 mg, Route: IVPB, Drug

form: INJ, ONCE, Dosing Weight 59.091, kg, Priority: STAT, Start date: 19 
21:24:00 CST, Stop date: 19 21:24:00 CST                                  

       [facility]

 

       Magnesium Sulfate        2019 01:46:00        No                   

              Notes: WASTE: F/P - Sink; E

- [facility]                                         [facility]

 

       Calcium Gluconate        2019 01:46:00        No                   

              Notes: WASTE: F/P - Sink; E

[facility]

 

             Bumetanide 1 MG Oral Tablet Bumetanide 1 MG Oral Tablet 2019 

00:00:00              

Yes     EDGAR RUIZ MD         1       QD      TAKE 1 TABLET DAILY          

       [facility] 

[facility]

 

                metoprolol succinate 25 mg oral capsule, extended release       

          2019 21:06:00 

        Yes                                     25 mg = 1 cap, PO, Daily, 0 Refi

ll(s)                 [facility]

 

       losartan 50 mg oral tablet        2019 21:02:00        Yes         

                       50 mg = 1 tab, 

PO, BID, 0 Refill(s)                                         [facility]

 

       warfarin 2 mg oral tablet        2019 21:02:00        Yes          

                      2 mg = 1 tab, PO, 

Daily, 0 Refill(s)                                          [facility]

 

        pantoprazole 40 mg oral enteric coated tablet         2019 21:02:0

0         Yes                             

                40 mg = 1 tab, PO, Daily, 0 Refill(s)                           

      [facility]

 

       amLODIPine 5 mg oral tablet        2019 21:02:00        Yes        

                        5 mg = 1 tab, 

PO, Daily, 0 Refill(s)                                         [facility]

 

       atorvastatin 40 mg oral tablet        2019 21:02:00        Yes     

                           40 mg = 1 

tab, PO, Bedtime, # 30 tab, 0 Refill(s)                                         

[facility]

 

                    Atorvastatin Calcium 40 MG Oral Tablet Atorvastatin Calcium 

40 MG Oral Tablet 

2019 00:00:00           Yes       EDGAR RUIZ MD           1          

         TOME LATISHA (1) TABLETA(S) POR 

LA BOCA AL ACOSTARSE.                                         [facility]

[facility]

 

                          Nitroglycerin 0.4 MG Sublingual Tablet Sublingual Nitr

oglycerin 0.4 MG 

Sublingual Tablet Sublingual 2018 00:00:00         Yes     EDGAR RUIZ MD                         

DESUELVA LATISHA (1) TABLETA DEBAJO DE LA LENGUA WATSON NECESARIO PARA DOLOR DE PECHO.
                                                            [facility] 

[facility]

 

                    Losartan Potassium 50 MG Oral Tablet Losartan Potassium 50 M

G Oral Tablet 

2018 00:00:00           Yes       EDGAR RUIZ MD           1         Q

0.5D     TOME LATISHA (1) TABLETA(S) 

POR LA BOCA DOS VECES AL TERRA.                                         [facility]

 [facility]

 

       tramadol hydrochloride 50 MG Oral Tablet        2018 23:20:00      

  Yes                                50 

mg = 1 tab, PO, Q8H, PRN Pain, X 7 day, # 21 tab, 0 Refill(s)                   

                      Kevin Lam

 

       tizanidine 2 mg oral tablet        2018 22:33:00        Yes        

                        2 mg = 1 tab, 

PO, Q8H, PRN for muscle spasms, # 21 tab, 0 Refill(s)                           

              Kevin Lam

 

                                        Acetaminophen 300 MG / Codeine Phosphate

 30 MG Oral Tablet [Tylenol with Codeine

 #3]            2018 22:32:00         Yes                                 

    1 tab, PO, Q6H, PRN Pain, X 7 day, # 28

tab, 0 Refill(s)                                            Kevin Lam

 

       Fentanyl        2018 18:49:00        No                            

     Notes: (Same as: Sublimaze)  

Preservative free.                                          Kevin Lam

 

           Jantoven 2 MG Oral Tablet Jantoven 2 MG Oral Tablet 2016 00:00:

00            Yes        

EDGAR RUIZ MD           1         QD        TOME LATISHA (1) TABLETA(S) POR LA 

BOCA DIARIO.                     

[facility]

 

           Compression Stocking Compression Stocking 2016-06-15 00:00:00        

    Yes        RENZO CHU M.D.                               15-20 MMHD bilateral knee hig

h                     [facility]

 

       Cephalexin 500 MG Oral Capsule [Keflex]        2016 18:40:00       

 Yes                                500 

mg = 1 cap, PO, TID, X 10 day, # 30 cap, 0 Refill(s)                            

             Kevin Lam

 

       Rocephin        2016 15:51:00        No                            

     1 gm, Route: IVPB, Drug form: 

PDR/INJ, ONCE, Dosing Weight 61.818, kg, Priority: STAT, Start date: 16 
10:51:00 CDT, Stop date: 16 10:51:00 CDT                                  

       Kevin Lam

 

       Tylenol        2016 13:23:00        No                             

    650 mg, Route: PO, Drug form: TAB, 

ONCE, Dosing Weight 62.727, kg, Priority: STAT, Start date: 16 8:23:00 
CDT, Stop date: 16 8:23:00 CDT                                         Pramod Lam

 

                          Pantoprazole Sodium 40 MG Oral Tablet Delayed Release 

Pantoprazole Sodium 40 MG 

Oral Tablet Delayed Release 2015-10-14 00:00:00           Yes       ELISHA SALGUERO

 M.D.           1         

Q0.5D           TOME LATISHA (1) TABLETA(S) POR LA BOCA DOS VECES AL TERRA.           

                      [facility]

 

                    Flecainide Acetate 100 MG Oral Tablet Flecainide Acetate 100

 MG Oral Tablet 

2015 00:00:00           Yes       ELISHA SALGUERO M.D.                     Q0

.5D     TOME LATISHA (1) TABLETA(S) 

POR LA BOCA DOS VECES AL TERRA.                                         [facility]

 

                    amLODIPine Besylate 5 MG Oral Tablet amLODIPine Besylate 5 M

G Oral Tablet 

2015 00:00:00           Yes       ELISHA SALGUERO M.DCristian                       

        TOME LATISHA (1) TABLETA(S) POR LA

 BOCA EN LA MANANA Y MEDIA (1/2) TABLETA EN LA TARDE.                           

              [facility] 

[facility]

 

                          Ciprofloxacin Hcl (Cipro) 500 Mg Tablet, 500 Mg Oral C

iprofloxacin Hcl (Cipro) 

500 Mg Tablet, 500 Mg Oral 2014 00:00:00 2015 00:00:00 No           

   Osvaldo Campos Md           500                 Every 12 Hours                     [facility]

 

       Hydrochlorothiazide 12.5 MG Oral Capsule        2014 17:03:25      

  Yes                                 

(Active)                                                    Kevin Lam

 

     Lopid 600 MG Oral Tablet      2013 23:02:16      Yes                 

      (Active)           

Kevin Lam

 

           Enoxaparin Sodium 40 MG/0.4ML Subcutaneous Solution            2013 06:00:00            Yes        

                                        ; Start Date: 2013; End Date: 1900 (Active)                     Kevin Lam

 

       Diovan 160 MG Oral Tablet        2013 05:00:00        Yes          

                      ; Start Date: 

2013; End Date: 1900 (Active)                                       

  Kevin Lam

 

           Enoxaparin Sodium 40 MG/0.4ML Subcutaneous Solution             05:00:00            Yes        

                                        ; Start Date: 2013; End Date: 1900 (Active)                     Kevin Lam

 

       Pravastatin Sodium 40 MG Oral Tablet        2013 06:00:00        Ye

s                                ; Start

 Date: 2013; End Date: 1900 (Active)                                

         Kevin Lam

 

       Warfarin Sodium 3 MG Oral Tablet        2012 05:00:00        Yes   

                             ; Start 

Date: 2012; End Date: 1900 (Active)                                 

        Kevin Lam

 

       Coumadin        2011 23:00:00        No     David N Bapat        

              5 mg, 1 tab, Route: 

PO, Drug form: TAB, Q5PM, Start date: 11 17:00:00, Duration: 30 day, Stop 
date: 11 17:00:00                                         Kevin Lam

 

           Sodium Chloride 0.45% IV 1,000 mL            2011 17:03:00     

       No         Haytham Pavel 

Al-Azzeh                                                        1,000 mL, Rate: 

125 ml/hr, Infuse over: 8 hr, Route: IV, Total 

Volume: 1,000, Start date: 11 11:03:00, Duration: 6 hr, Stop date: 
11/15/11 1:02:00                                            Kevin Lam

 

       Protonix        2011 22:30:00        No     David N Bapat        

              40 mg, 1 tab, Route: 

PO, Drug form: ECTAB, Before Dinner, Start date: 11 16:30:00, Duration: 30
 day, Stop date: 11 16:30:00                                         Xiang Lam

 

           nitroglycerin 0.4 mg sublingual tablet            2011 21:22:00

            No         David N 

Bapat                                                           0.4 mg, 1 tab, R

oute: SL, Drug form: TAB, Q5Min, PRN Chest Pain, 

Start date: 11 15:22:00, Duration: 30 day, Stop date: 11 15:21:00   

                        

                                        [facility]

 

       atropine        2011 21:22:00        No     David N Bapat        

              0.5 mg, 5 mL, Route: 

IVP, Drug form: INJ, PRN, PRN Bradycardia, Start date: 11 15:22:00, 
Duration: 30 day, Stop date: 11 15:21:00                                  

       [facility]

 

       Lanoxin        2011 15:00:00        No     Monroe DavidLeod Ibanez    

                  0.125 mg, 0.5 mL, 

Route: IV, Drug form: INJ, QAM, Start date: 11 9:00:00, Duration: 30 day, 
Stop date: 11 9:00:00                                         [facility]

 

       Prilosec        2011 15:00:00        No     Cornelio Po Km       

               20 mg, Route: PO, 

Drug form: DRC, Daily, Start date: 11 9:00:00, Duration: 30 day, Stop 
date: 11 9:00:00                                         [facility] HCT 80 mg-12.5 mg oral tablet            2011 15:00:00  

          No         Cornelio Po Km

                                                                2 tab, Route: PO

, Drug Form: TAB, Daily, Start date: 11 9:00:00, 

Duration: 30 day, Stop date: 11 9:00:00                                   

      [facility]

 

       gemfibrozil        2011 15:00:00        No     Cornelio Po Km    

                  600 mg, 1 tab, 

Route: PO, Drug form: TAB, BID, Start date: 11 9:00:00, Duration: 30 day, 
Stop date: 11 17:00:00                                         [facility]

 

           hydrochlorothiazide 25 mg oral tablet            2011 15:00:00 

           No         Cornelio Po 

Km                                                             25 mg, 1 tab, Ro

dawit: PO, Drug form: TAB, Daily, Start date: 11 

9:00:00, Duration: 30 day, Stop date: 11 9:00:00                          

               [facility]        2011 15:00:00        No     Cornelio Po Km         

             160 mg, 2 tab, Route: 

PO, Drug form: TAB, Daily, Start date: 11 9:00:00, Duration: 30 day, Stop 
date: 11 9:00:00                                         [facility]

 

       Lanoxin        2011 01:20:00        No     David N Bapat         

             0.25 mg, 1 mL, Route: 

IV, Drug form: INJ, ONCE, Start date: 11 19:20:00, Stop date: 11 
19:20:00                                                    [facility]

 

       Tylenol        2011 01:01:00        No     Cornelio Po Km        

              650 mg, 2 tab, Route: 

PO, Drug form: TAB, Q6H, PRN Pain, Start date: 11 19:01:00, Duration: 30 
day, Stop date: 11 19:00:00                                         Antonio

al Genaro

 

       morphine Sulfate        2011 01:01:00        No     Cornelio Po Mo

k                      2 mg, 1 mL, 

Route: IVP, Drug form: INJ, Q4H, PRN Pain, Start date: 11 19:01:00, 
Duration: 30 day, Stop date: 11 19:00:00                                  

       [facility]

 

       Norco 5/325 oral tablet        2011 01:01:00        No     Cornelio Enr

ique Km                      1 tab,

 Route: PO, Drug Form: TAB, Q6H, PRN Pain, Start date: 11 19:01:00, 
Duration: 30 day, Stop date: 11 19:00:00                                  

       [facility]

 

       Zofran        2011 01:01:00        No     Cornelio Po Km         

             4 mg, 2 mL, Route: IV, 

Drug form: INJ, Q6H, PRN Nausea, Start date: 11 19:01:00, Duration: 30 
day, Stop date: 11 19:00:00                                         Antonio Vinson

 

       Lopressor        2011 00:39:00        No     David N Bapat       

               5 mg, 5 mL, Route: 

IV, Drug form: INJ, Q4H, PRN Other -See Comment, Start date: 11 18:39:00, 
Duration: 30 day, Stop date: 11 18:38:00                                  

       [facility]

 

       Lovenox        2011 00:00:00        No     Cornelio Po Km        

              60 mg, 0.6 mL, Route: 

SUB-Q, Drug form: INJ, [id number], Start date: 11 18:00:00, Duration: 30 
day, Stop date: 11 6:00:00                                         Jacobo Lam

 

       Lanoxin        2011 19:20:00        No     David TOBIAS Bapat         

             0.25 mg, 1 mL, Route: 

IV, Drug form: INJ, ONCE, Start date: 11 13:20:00, Stop date: 11 
13:20:00                                                    [facility]

 

       metoprolol        2011 15:00:00        No     Monroe Begum Ibanez 

                     50 mg, 1 tab, 

Route: PO, Drug form: TAB, Q12H, Hold for SBP < 90mmHg or HR < 60. First dose 
now., Start date: 11 9:00:00, Duration: 30 day, Stop date: 11 
21:00:00                                                    [facility]

 

       Saline Flush 0.9%        2011 15:00:00        No     Cornelio Farrellrique M

ok                      5 ml, Route:

 IVP, Drug Form: INJ, Q12H, Start date: 11 9:00:00, Duration: 30 day, Stop
 date: 11 21:00:00                                         [facility]

 

       famotidine        2011 15:00:00        No     Cornelio Farrellrique Km     

                 20 mg, 1 tab, 

Route: PO, Drug form: TAB, Q12H, Start date: 11 9:00:00, Duration: 30 day,
 Stop date: 11 21:00:00                                         [facility]

janet

 

       aspirin 325 mg tablet        2011 15:00:00        No     Aki Cole

 [place]                      325 

mg, 1 tab, Route: PO, Drug form: TAB, Daily, Start date: 11 9:00:00, 
Duration: 30 day, Stop date: 11 9:00:00                                   

      [facility]

 

                diltiazem 125 mg + Sodium Chloride 0.9% (titrate) 100 mL        

         2011 12:54:00  

           No         Monroe Ibanez                                  100 

mL, Rate: Start at 5mg/hr. Titrate to 

maintain HR < 100., Route: IV, Total Volume: 125, Titrate to off after 
metoprolol started, Duration: 30 day, Stop date: 11 6:53:00, Replace 
Every: 24 hr                                                [facility] Lam

 

       Saline Flush 0.9%        2011 12:53:00        No     Aki Cole Chavo knox                      5 ml, 

Route: IVP, Drug Form: INJ, PRN, PRN Line Flush, Start date: 11 6:53:00, 
Duration: 30 day, Stop date: 11 6:52:00                                   

      [facility]

 

       Saline Flush 0.9%        2011 09:21:00        No     Cornelio jansen                      5 ml, Route:

 IVP, Drug Form: INJ, PRN, PRN Line Flush, Start date: 11 3:21:00, 
Duration: 30 day, Stop date: 11 3:20:00                                   

      [facility]

 

       nitroglycerin 0.4 mg sublingual tablet        2011 07:53:46        

Yes                                0.4 

mg, 1 tab, SL, Q5Min, PRN, as needed for chest pain, Substitution Allowed       

                                  

Kevin Lam

 

           Diovan  mg-25 mg oral tablet            2011 07:53:28   

         Yes        Cornelio [facility]

                                        1 tab, PO, Daily, 30 tab, Substitution A

llowed, Maintenance, TAB                     

[facility] [place]

 

       metoprolol 50 mg oral tablet        2011 07:52:21        Yes       

                         1 tab, PO, BID,

 180 tab, Substitution Allowed, TAB                                         [facility]

[facility] [place]

 

        gemfibrozil 600 mg oral tablet         2011 07:51:09         Yes  

   Cornelio [facility]                 

                600 mg, 1 tab, PO, BID, Substitution Allowed                    

             [facility] [place]

 

     Fosamax      2011 07:50:13      Yes                      PO, Daily, S

ubstitution Allowed           

[facility]

 

           Prilosec 20 mg oral delayed release capsule            2011 07:

50:00            Yes        Cornelio 

[facility]                               1 cap, PO, Daily, 30 cap, Substitution

 Allowed                     [facility] 

[place]

 

       magnesium sulfate        2011 07:32:00        No     Aki Cole Chavo

ster                      2 gm, 

Route: IVPB, Drug form: INJ, ONCE, Total dose = 2 gm, Start date: 11 
1:32:00, Duration: 1 doses or times, Stop date: 11 1:32:00                

                         [facility]

 

       K-Dur 20        2011 07:32:00        No     Aki Cole Boester     

                 40 mEq, Route: PO, 

Drug form: ERTAB, ONCE, Start date: 11 1:32:00, Stop date: 11 
1:32:00                                                     [facility]

 

                diltiazem 125 mg + Sodium Chloride 0.9% (titrate) 100 mL        

         2011 06:38:00  

           No         Aki Cole Boester                                  100 mL

, Rate: Titrate, Route: IV, Total Volume: 125

 ml, Duration: 30 day, Stop date: 11 0:37:00, Replace Every: 24 hr        

                                 

[facility]

 

       Cardizem        2011 04:12:00        No     Aki Cole Boester     

                 15 mg, Route: IVP, 

Drug form: INJ, ONCE, Priority: STAT, Start date: 11 22:12:00, Stop date: 
11 22:12:00                                           [facility]

 

       aspirin 325 mg tablet        2011 04:06:00        No     Aki Cole

 [place]                      325 

mg, 1 tab, Route: PO, Drug form: TAB, ONCE, Priority: STAT, Start date: 11
 22:06:00, Stop date: 11 22:06:00                                         

[facility]

 

       Saline Flush 0.9%        2011 04:06:00        No     David TOBIAS Bapa

t                      5 ml, Route: 

IVP, Drug Form: INJ, PRN, PRN Line Flush, Start date: 11 22:06:00, 
Duration: 24 hr, Stop date: 11 22:05:00                                   

      [facility]

 

       Alendronate Sodium 70 MG Oral Tablet        1900 06:00:00        Ye

s                                ; Start

 Date: 1900; End Date: 1900 (Active)                                

         [facility]

 

        Omeprazole 20 MG Oral Capsule Delayed Release         1900 06:00:0

0         Yes                             

                ; Start Date: 1900; End Date: 1900 (Active)         

                        [facility]

 

       Hydrochlorothiazide 12.5 MG Oral Capsule        1900 06:00:00      

  Yes                                ; 

Start Date: 1900; End Date: 1900 (Active)                           

              [facility]

 

        Nitrostat 0.4 MG Sublingual Tablet Sublingual         1900 06:00:0

0         Yes                             

                ; Start Date: 1900; End Date: 1900 (Active)         

                        [facility]

 

       Flecainide Acetate 100 MG Oral Tablet        1900 06:00:00        Y

es                                ; 

Start Date: 1900; End Date: 1900 (Active)                           

              Kevin Lam

 

                    Metoprolol Succinate ER 50 MG Oral Tablet Extended Release 2

4 Hour                     1900 

06:00:00         Yes                                     ; Start Date: 

0; End Date: 1900 (Active)  

                                                    Kevin Lam

 

       Gemfibrozil 600 MG Oral Tablet        1900 06:00:00        Yes     

                           ; Start Date:

 1900; End Date: 1900 (Active)                                      

   [facility]

 

       AmLODIPine Besylate 5 MG Oral Tablet        1900 06:00:00        Ye

s                                ; Start

 Date: 1900; End Date: 1900 (Active)                                

         [facility]

 

                    Metoprolol Succinate ER 50 MG Oral Tablet Extended Release 2

4 Hour                     1900 

06:00:00         Yes                                     ; Start Date: 

0; End Date: 1900 (Active)  

                                                    Kevin Lam

 

                          Alendronate Sodium (Fosamax) 70 Mg Tablet Alendronate 

Sodium (Fosamax) 70 Mg 

Tablet             Yes               70          Wkly              [facility]

 

       Amlodipine Besylate 5 Mg Tablet Amlodipine Besylate 5 Mg Tablet          

     Yes                  5             

Twice A Day                                                 [facility]

 

                          Fenofibrate Nanocrystallized (Fenofibrate) 145 Mg Tabl

et Fenofibrate 

Nanocrystallized (Fenofibrate) 145 Mg Tablet             Yes               145  

       Daily             [facility]

 

        Flecainide Acetate 100 Mg Tablet Flecainide Acetate 100 Mg Tablet       

          Yes                     

100                       Twice A Day                            [facility]

 

             Metoprolol Succinate 50 Mg Tab.er.24h Metoprolol Succinate 50 Mg Ta

b.er.24h                           

Yes                     50              Bedtime                 [facility]

 

                          Pantoprazole Sodium (Protonix) 40 Mg Tablet. Pantopr

azole Sodium (Protonix) 40

 Mg Tablet.             Yes               40          Daily             [facility]

 

        Pravastatin Sodium 40 Mg Tablet Pravastatin Sodium 40 Mg Tablet         

        Yes                     40       

                Bedtime                                         [facility]

 

       Valsartan (Diovan) 160 Mg Tab Valsartan (Diovan) 160 Mg Tab              

 Yes                  160           

Daily                                                       [facility]

 

                    Warfarin Sodium (Coumadin*) 3 Mg Tablet Warfarin Sodium (Cou

madin*) 3 Mg Tablet 

              Yes                  1.5           Daily                [facility]

 

                          Aspirin EC 81 MG Oral Tablet Delayed Release Aspirin E

C 81 MG Oral Tablet 

Delayed Release             Yes               1     QD    TAKE 1 TABLET DAILY.  

           [facility]

 

                          Alendronate Sodium 70 Mg Tablet, 70 Mg Oral Alendronat

e Sodium 70 Mg Tablet, 70 

Mg Oral       2017 00:00:00 No                70          Qweek           

  [facility]

 

                          Amlodipine Besylate 5 Mg Tablet, 5 Mg Oral Amlodipine 

Besylate 5 Mg Tablet, 5 Mg

 Oral       2017 00:00:00 No                5           Daily             

[facility]

 

                          Flecainide Acetate 100 Mg Tablet, 100 Mg Oral Flecaini

de Acetate 100 Mg Tablet, 

100 Mg Oral       2017 00:00:00 No                100         Daily       

      [facility]

 

                Gemfibrozil 600 Mg Tablet, 600 Mg Oral Gemfibrozil 600 Mg Tablet

, 600 Mg Oral                 

2017 00:00:00 No                      600             Daily               

    [facility]

 

                          Metoprolol Succinate 50 Mg Tab.er.24h, 50 Mg Oral Meto

prolol Succinate 50 Mg 

Tab.er.24h, 50 Mg Oral       2017 00:00:00 No                50          D

aily             [facility]

 

                          Omeprazole Magnesium 20 Mg Capsule., 20 Mg Oral Omep

razole Magnesium 20 Mg 

Capsule., 20 Mg Oral       2017 00:00:00 No                20          [facility]

 

                          Pravastatin Sodium 40 Mg Tablet, 40 Mg Oral Pravastati

n Sodium 40 Mg Tablet, 40 

Mg Oral       2017 00:00:00 No                40          Daily           

  [facility]

 

                                        Valsartan/Hydrochlorothiazide (Diovan Hc

t 160-12.5 Mg Tab) 1 Each Tablet,   Oral

                                        Valsartan/Hydrochlorothiazide (Diovan Hc

t 160-12.5 Mg Tab) 1 Each Tablet,   

Oral        2017 00:00:00 No                            Daily             

[facility]

 

                          Warfarin Sodium (Jantoven) 3 Mg Tablet, 3 Mg Oral Warf

keeley Sodium (Jantoven) 3 

Mg Tablet, 3 Mg Oral       2017 00:00:00 No                3           Nicole

ly             [facility]

 

                          Hydrochlorothiazide 12.5 Mg Tablet, 12.5 Mg Oral Hydro

chlorothiazide 12.5 Mg 

Tablet, 12.5 Mg Oral       2015 00:00:00 No                12.5        Nicole

ly             [facility]

 

                          Valsartan (Diovan) 160 Mg Tab, 160 Mg Oral Valsartan (

Diovan) 160 Mg Tab, 160 Mg

 Oral       2015 00:00:00 No                160         Twice A Day       

      [facility]







Vital Signs





             Vital Name   Observation Time Observation Value Comments     Source

 

             Temperature Oral (F) 2020-10-31 01:17:00 98.4 F                    

[facility]

 

             Heart Rate   2020-10-31 01:17:00                           [facility]

 [place]

 

             Respitory Rate 2020-10-31 01:17:00                           [facility] [place]

 

             Systolic (mm Hg) 2020-10-31 01:17:00                           [facility]

[facility] [place]

 

             Diastolic (mm Hg) 2020-10-31 01:17:00                           [facility] [place]

 

             Height       2020-10-30 22:15:00 152.4 cm                  [facility]

 

             BMI Calculated 2020-10-30 22:15:00                           [facility]

[facility] [place]

 

             Weight       2020-10-30 22:15:00                           [facility]

 [place]

 

             Systolic (mm Hg) 2020-10-30 22:15:00                           [facility]

[facility] [place]

 

             Diastolic (mm Hg) 2020-10-30 22:15:00                           [facility] [place]

 

             Heart Rate   2020-10-30 22:15:00                           [facility]

 

             Respitory Rate 2020-10-30 22:15:00                           [facility] [place]

 

             Temperature Oral (F) 2020-10-30 22:15:00 100.3 F                   

[facility] [place]

 

             Respitory Rate 2020-10-18 12:41:00                           [facility]

[facility] [place]

 

             Temperature Oral (F) 2020-10-18 12:19:00 98.1 F                    

[facility]

 

             Heart Rate   2020-10-18 12:19:00                           [facility]

 [place]

 

             Systolic (mm Hg) 2020-10-18 12:19:00                           [facility] [place]

 

             Diastolic (mm Hg) 2020-10-18 12:19:00                           [facility] [place]

 

             Temperature Oral (F) 2020-10-18 04:00:00 98.2 F                    

[facility] [place]

 

             Heart Rate   2020-10-18 04:00:00                           [facility]

 [place]

 

             Respitory Rate 2020-10-18 04:00:00                           [facility]

[facility] [place]

 

             Systolic (mm Hg) 2020-10-18 04:00:00                           [facility]

[facility] [place]

 

             Diastolic (mm Hg) 2020-10-18 04:00:00                           [facility] [place]

 

             Respitory Rate 2020-10-18 02:07:00                           [facility]

[facility] [place]

 

             Systolic (mm Hg) 2020-10-18 01:31:00                           [facility] [place]

 

             Diastolic (mm Hg) 2020-10-18 01:31:00                           [facility]

[facility] [place]

 

             Heart Rate   2020-10-18 01:31:00                           [facility]

 [place]

 

             Temperature Oral (F) 2020-10-17 20:27:00 98 F                      

[facility]

 

             Height       2020-10-16 11:58:00 152.4 cm                  [facility]

 [place]

 

             Weight       2020-10-16 11:58:00                           [facility]

 [place]

 

             BMI Calculated 2020-10-16 11:58:00                           [facility]

[facility] [place]

 

             Respitory Rate 2020-10-02 17:47:00                           [facility]

[facility] [place]

 

             Systolic (mm Hg) 2020-10-02 17:47:00                           [facility]

[facility] [place]

 

             Diastolic (mm Hg) 2020-10-02 17:47:00                           [facility] [place]

 

             Respitory Rate 2020-10-02 17:30:00                           [facility]

[facility] [place]

 

             Systolic (mm Hg) 2020-10-02 17:30:00                           [facility] [place]

 

             Diastolic (mm Hg) 2020-10-02 17:30:00                           [facility]

[facility] [place]

 

             Respitory Rate 2020-10-02 17:00:00                           [facility]

[facility] [place]

 

             Systolic (mm Hg) 2020-10-02 17:00:00                           [facility] [place]

 

             Diastolic (mm Hg) 2020-10-02 17:00:00                           [facility]

[facility] [place]

 

             Temperature Oral (F) 2020-10-02 12:12:00 98.1 F                    

[facility]

 

             Height       2020-10-02 11:52:00 152.4 cm                  [facility]

 [place]

 

             Weight       2020-10-02 11:52:00                           [facility]

 [place]

 

             BMI Calculated 2020-10-02 11:52:00                           [facility]

[facility] [place]

 

             Temperature Oral (F) 2020 16:31:00 97.8 F                    

[facility] [place]

 

             Heart Rate   2020 16:31:00                           [facility]

 [place]

 

             Respitory Rate 2020 16:31:00                           [facility]

[facility] [place]

 

             Systolic (mm Hg) 2020 16:31:00                           [facility] [place]

 

             Diastolic (mm Hg) 2020 16:31:00                           [facility] [place]

 

             Temperature Oral (F) 2020 12:31:00 98.1 F                    

[facility] [place]

 

             Heart Rate   2020 12:31:00                           [facility]

 [place]

 

             Respitory Rate 2020 12:31:00                           [facility]

[facility] [place]

 

             Systolic (mm Hg) 2020 12:31:00                           [facility] [place]

 

             Diastolic (mm Hg) 2020 12:31:00                           [facility]

[facility] [place]

 

             Temperature Oral (F) 2020 09:14:00 98.3 F                    

[facility] [place]

 

             Heart Rate   2020 09:14:00                           [facility]

 [place]

 

             Respitory Rate 2020 09:14:00                           [facility]

[facility] [place]

 

             Systolic (mm Hg) 2020 09:14:00                           [facility] [place]

 

             Diastolic (mm Hg) 2020 09:14:00                           [facility] [place]

 

             Height       2020 18:25:00 152.4 cm                  [facility]

 [place]

 

             Weight       2020 18:25:00                           [facility]

 [place]

 

             BMI Calculated 2020 18:25:00                           [facility]

[facility] [place]

 

             Height       2020 15:43:00 165.1 cm                  [facility]

 [place]

 

             BMI Calculated 2020 15:43:00                           [facility]

[facility] [place]

 

             Weight       2020 15:43:00                           [facility]

 [place]

 

             Temperature Oral (F) 2020 20:35:00 98.6 F                    

[facility] [place]

 

             Heart Rate   2020 20:35:00                           [facility]

 [place]

 

             Respitory Rate 2020 20:35:00                           [facility]

[facility] [place]

 

             Systolic (mm Hg) 2020 20:35:00                           [facility] [place]

 

             Diastolic (mm Hg) 2020 20:35:00                           [facility] [place]

 

             Temperature Oral (F) 2020 16:45:00 98.4 F                    

[facility] [place]

 

             Heart Rate   2020 16:45:00                           [facility]

 [place]

 

             Respitory Rate 2020 16:45:00                           [facility]

[facility] [place]

 

             Systolic (mm Hg) 2020 16:45:00                           [facility] [place]

 

             Diastolic (mm Hg) 2020 16:45:00                           [facility] [place]

 

             Temperature Oral (F) 2020 13:12:00 98.7 F                    

[facility] [place]

 

             Heart Rate   2020 13:12:00                           [facility]

 [place]

 

             Respitory Rate 2020 13:12:00                           [facility]

[facility] [place]

 

             Systolic (mm Hg) 2020 13:12:00                           [facility] [place]

 

             Diastolic (mm Hg) 2020 13:12:00                           [facility] [place]

 

             Height       2020-05-15 14:39:00 152.4 cm                  [facility]

 [place]

 

             Weight       2020-05-15 14:39:00                           [facility]

 [place]

 

             BMI Calculated 2020-05-15 14:39:00                           [facility]

[facility] [place]

 

             BMI Calculated 2020-05-15 11:49:00                           [facility]

[facility] [place]

 

             Height       2020-05-15 11:24:00 152.4 cm                  [facility]

 [place]

 

             BMI Calculated 2020-05-15 11:24:00                           [facility]

[facility] [place]

 

             Weight       2020-05-15 11:24:00                           [facility]

 [place]

 

                Systolic blood pressure 2020 13:24:00 139 mm[Hg]      Loca

tion: LUE; Position: 

Sitting                                 [facility]

 

                Diastolic blood pressure 2020 13:24:00 72 mm[Hg]       Loc

ation: LUE; Position: 

Sitting                                 [facility]

 

             Body height  2020 13:24:00 60 [in_us]                [facility]

 

             Weight       2020 13:24:00 130 [lb_av]               [facility]

 

             Body mass index (BMI) [Ratio] 2020 13:24:00 25.39 kg/m2      

         [facility]

 

             Heart Rate   2020 13:24:00 80 /min                   [facility]

 

                Systolic blood pressure 2020 15:23:00 124 mm[Hg]      Loca

tion: LUE; Position: 

Sitting                                 [facility]

 

                Diastolic blood pressure 2020 15:23:00 66 mm[Hg]       Loc

ation: LUE; Position: 

Sitting                                 [facility]

 

             Body height  2020 15:23:00 60 [in_us]                [facility]

 

             Weight       2020 15:23:00 130 [lb_av]               [facility]

 

             Body mass index (BMI) [Ratio] 2020 15:23:00 25.39 kg/m2      

         [facility]

 

             Heart Rate   2020 15:23:00 68 /min                   [facility]

 

                Systolic blood pressure 2020 10:50:00 129 mm[Hg]      Loca

tion: LUE; Position: 

Sitting                                 [facility]

 

                Diastolic blood pressure 2020 10:50:00 78 mm[Hg]       Loc

ation: LUE; Position: 

Sitting                                 [facility]

 

             Body height  2020 10:50:00 60 [in_us]                [facility]

 

             Weight       2020 10:50:00 130 [lb_av]               [facility]

 

             Body mass index (BMI) [Ratio] 2020 10:50:00 25.39 kg/m2      

         [facility]

 

             Heart Rate   2020 10:50:00 71 /min      Location: L Radial;  

[facility]

 

             Heart Rate   2020 00:00:00                           [facility]

 [place]

 

             Respitory Rate 2020 00:00:00                           [facility] [place]

 

             Systolic (mm Hg) 2020 00:00:00                           [facility] [place]

 

             Diastolic (mm Hg) 2020 00:00:00                           [facility]

[facility] [place]

 

             Temperature Oral (F) 2020 19:20:00 98.3 F                    

[facility] [place]

 

             Heart Rate   2020 19:20:00                           [facility]

 [place]

 

             Respitory Rate 2020 19:20:00                           [facility]

[facility] [place]

 

             Systolic (mm Hg) 2020 19:20:00                           [facility] [place]

 

             Diastolic (mm Hg) 2020 19:20:00                           [facility]

[facility] [place]

 

             Systolic (mm Hg) 2020 19:13:00                           [facility] [place]

 

             Diastolic (mm Hg) 2020 19:13:00                           [facility]

[facility] [place]

 

             Heart Rate   2020 19:13:00                           [facility]

 [place]

 

             Respitory Rate 2020 19:13:00                           [facility]

al [place]

 

             Temperature Oral (F) 2020 19:13:00 98.7 F                    

[facility]

 

             Height       2020 19:13:00 162.56 cm                 [facility]

 

             BMI Calculated 2020 19:13:00                           [facility]

al [place]

 

             Weight       2020 19:13:00                           [facility]

 [place]

 

             BP Systolic  2020 10:00:00 124 mm[Hg]   Location: [facility]DA [facility]

on: Sitting 

[facility]

 

             BP Diastolic 2020 10:00:00 56 mm[Hg]    Location: TERELL; [facility]

on: Sitting 

[facility]

 

             Height       2020 10:00:00 60 [in_us]                [facility]

 

             Weight       2020 10:00:00 130 [lb_av]               [facility]

 

             Body Mass Index Calculated 2020 10:00:00 25.39 kg/m2         

      [facility]

 

             Heart Rate   2020 10:00:00 51 /min                   [facility]

 

             Temperature Oral (F) 2019 17:52:00 98.9 F                    

[facility] [place]

 

             Heart Rate   2019 17:52:00                           [facility]

 [place]

 

             Respitory Rate 2019 17:52:00                           [facility]

[facility] [place]

 

             Systolic (mm Hg) 2019 17:52:00                           [facility]

rial [place]

 

             Diastolic (mm Hg) 2019 17:52:00                           [facility]

[facility]

 

             Heart Rate   2019 14:30:00                           [facility]

 [place]

 

             Temperature Oral (F) 2019 13:26:00 98.1 F                    

[facility]

 

             Heart Rate   2019 13:26:00                           [facility]

 [place]

 

             Respitory Rate 2019 13:26:00                           [facility]

al [place]

 

             Systolic (mm Hg) 2019 13:26:00                           [facility]ardo

rial [place]

 

             Diastolic (mm Hg) 2019 13:26:00                           [facility]

[facility] [place]

 

             Respitory Rate 2019 09:15:00                           [facility]

[facility] [place]

 

             Systolic (mm Hg) 2019 09:15:00                           [facility]

rial [place]

 

             Diastolic (mm Hg) 2019 09:15:00                           [facility]

[facility]

 

             Temperature Oral (F) 2019 09:15:00 97.9 F                    

[facility] [place]

 

             Height       2019 04:31:00 152.4 cm                  [facility]

 [place]

 

             Weight       2019 04:31:00                           [facility]

 [place]

 

             BMI Calculated 2019 04:31:00                           [facility]

[facility] [place]

 

             Height       2019 23:29:00 154.94 cm                 [facility]

 [place]

 

             BMI Calculated 2019 23:29:00                           [facility]

[facility] [place]

 

             Weight       2019 23:29:00                           [facility]

 [place]

 

             BP Systolic  2019 10:27:00 179 mm[Hg]   Location: RUE; [facility]

on: Sitting 

[facility]

 

             BP Diastolic 2019 10:27:00 81 mm[Hg]    Location: RUE; [facility]

on: Sitting 

[facility]

 

             BP Systolic  2019 10:21:00 170 mm[Hg]   Location: LUE; [facility]

on: Sitting 

[facility]

 

             BP Diastolic 2019 10:21:00 79 mm[Hg]    Location: LUE; [facility]

on: Sitting 

[facility]

 

             Height       2019 10:21:00 60 [in_us]                [facility]

 

             Weight       2019 10:21:00 136 [lb_av]               [facility]

 

             Body Mass Index Calculated 2019 10:21:00 26.56 kg/m2         

      [facility]

 [facility]

 

             Heart Rate   2019 10:21:00 60 /min                   [facility]

 

             BP Systolic  2019 11:18:00 173 mm[Hg]   Location: LUE; [facility]

on: Sitting 

[facility]

 

             BP Diastolic 2019 11:18:00 78 mm[Hg]    Location: LUE; [facility]

on: Sitting 

[facility]

 

             BP Systolic  2019 11:15:00 166 mm[Hg]   Location: RUE; [facility]

on: Sitting 

[facility]

 

             BP Diastolic 2019 11:15:00 72 mm[Hg]    Location: RUE; [facility]

on: Sitting 

[facility]

 

             Height       2019 11:15:00 60 [in_us]                [facility]

 

             Weight       2019 11:15:00 136 [lb_av]               Univers[facility]

ty [facility]

 

             Body Mass Index Calculated 2019 11:15:00 26.56 kg/m2         

      [facility]

 [facility]

 

             Heart Rate   2019 11:15:00 58 /min                   [facility]

 

             BP Systolic  2019 09:20:00 132 mm[Hg]   Location: LUE; [facility]

on: Sitting 

[facility]

 

             BP Diastolic 2019 09:20:00 64 mm[Hg]    Location: HUANGE; [facility]

on: Sitting 

[facility]

 

             Height       2019 09:20:00 60 [in_us]                [facility]

 

             Weight       2019 09:20:00 136 [lb_av]               Univers[facility]

ty [facility]

 

             Body Mass Index Calculated 2019 09:20:00 26.56 kg/m2         

      [facility]

 [facility]

 

             Heart Rate   2019 09:20:00 59 /min                   [facility]

[facility]

 

             BP Systolic  2019 08:35:00 140 mm[Hg]                Universi

ty [facility]

 

             BP Diastolic 2019 08:35:00 90 mm[Hg]                 [facility]

ty [facility]

 

             Height       2019 08:35:00 60 [in_us]                [facility]

 

             Weight       2019 08:35:00 135 [lb_av]               Universi

ty [facility]

 

             Body Mass Index Calculated 2019 08:35:00 26.37 kg/m2         

      [facility]

 [facility]

 

             Heart Rate   2019 08:35:00 56 /min                   Univers[facility]

ty [facility]

 

             BP Systolic  2019 14:33:00 132 mm[Hg]   Location: LUE; [facility]

on: Sitting 

[facility]

 

             BP Diastolic 2019 14:33:00 76 mm[Hg]    Location: LUE; [facility]

on: Sitting 

[facility]

 

             Height       2019 14:33:00 60 [in_us]                [facility]

 

             Weight       2019 14:33:00 137 [lb_av]               [facility]

 

             Body Mass Index Calculated 2019 14:33:00 26.76 kg/m2         

      [facility]

 [facility]

 

             Heart Rate   2019 14:33:00 52 /min      Location: L Radial;  

[facility] 

[facility]

 

             BP Systolic  2018-10-05 10:30:00 142 mm[Hg]   Position: Sitting Uni

[facility]

 [facility]

 

             BP Diastolic 2018-10-05 10:30:00 63 mm[Hg]    Position: Sitting Uni

[facility]

 

             Height       2018-10-05 10:30:00 60 [in_us]                [facility]

 

             Weight       2018-10-05 10:30:00 133 [lb_av]               [facility]

 

             Body Mass Index Calculated 2018-10-05 10:30:00 25.98 kg/m2         

      [facility]

 

             Heart Rate   2018-10-05 10:30:00 50 /min      Quality: Normal Unive

[facility] 

[facility]

 

             Heart Rate   2018 23:23:00                           [facility]

 [place]

 

             Systolic (mm Hg) 2018 23:23:00                           [facility]

[facility] [place]

 

             Diastolic (mm Hg) 2018 23:23:00                           Mem

[facility] [place]

 

             Respitory Rate 2018 23:23:00                           [facility]

[facility] [place]

 

             Temperature Oral (F) 2018 23:23:00 97.9 F                    

[facility]

 

             Heart Rate   2018 20:29:00                           [facility]

 [place]

 

             Systolic (mm Hg) 2018 20:29:00                           [facility]

rial [place]

 

             Diastolic (mm Hg) 2018 20:29:00                           [facility]

[facility] [place]

 

             Respitory Rate 2018 20:29:00                           [facility]

[facility] [place]

 

             Respitory Rate 2018 20:24:00                           [facility]

al [place]

 

             Heart Rate   2018 20:24:00                           [facility]

 [place]

 

             Systolic (mm Hg) 2018 20:24:00                           [facility]

[facility] [place]

 

             Diastolic (mm Hg) 2018 20:24:00                           [facility]

[facility] [place]

 

             Weight       2018 18:03:00                           [facility]

 [place]

 

             BMI Calculated 2018 18:03:00                           [facility]

 

             Height       2018 18:03:00 152.4 cm                  [facility]

 

             Temperature Oral (F) 2018 18:03:00 98.7 F                    

[facility]

 

             BP Systolic  2018 11:03:00 146 mm[Hg]                [facility]

 

             BP Diastolic 2018 11:03:00 80 mm[Hg]                 [facility]

 

             Height       2018 11:03:00 60 [in_us]                [facility]

 

             Weight       2018 11:03:00 138 [lb_av]               Univers[facility]

ty [facility]

 

             Body Mass Index Calculated 2018 11:03:00 26.95 kg/m2         

      [facility]

 [facility]

 

             Heart Rate   2018 11:03:00 55 /min                   Univers[facility]

ty [facility]

 

             BP Systolic  2018-02-15 13:09:00 171 mm[Hg]                Univers[facility]

ty [facility]

 

             BP Diastolic 2018-02-15 13:09:00 67 mm[Hg]                 [facility]

 

             Weight       2018-02-15 13:09:00 130 [lb_av]               [facility]

ty [facility]

 

             Body Mass Index Calculated 2018-02-15 13:09:00 25.39 kg/m2         

      [facility]

 [facility]

 

             Height       2018-02-15 13:09:00 60 [in_us]                Univers[facility]

ty [facility]

 

             Heart Rate   2018-02-15 13:09:00 59 /min                   [facility]

[facility]

 

             BP Systolic  2018 10:03:00 138 mm[Hg]   Location: LINDA [place]

on: Sitting 

[facility]

 

             BP Diastolic 2018 10:03:00 60 mm[Hg]    Location: TERELL; [place]

on: Sitting 

[facility]

 

             Height       2018 10:03:00 60 [in_us]                [facility]

 

             Weight       2018 10:03:00 130.6 [lb_av]              Univers

[facility]

 

             Body Mass Index Calculated 2018 10:03:00 25.51 kg/m2         

      [facility]

 [facility]

 

             Heart Rate   2018 10:03:00 51 /min      Location: L Radial;  

[facility] 

[facility]

 

             Systolic (mm Hg) 2017 18:52:00                           [facility]

rial [place]

 

             Diastolic (mm Hg) 2017 18:52:00                           [facility]

[facility] [place]

 

             Temperature Oral (F) 2017 18:52:00 98.2 F                    

[facility] [place]

 

             Respitory Rate 2017 18:52:00                           [facility]

[facility] [place]

 

             Temperature Oral (F) 2017 18:24:00 97.9 F                    

[facility] [place]

 

             Systolic (mm Hg) 2017 18:24:00                           [facility]

rial [place]

 

             Diastolic (mm Hg) 2017 18:24:00                           Mem

[facility] [place]

 

             Respitory Rate 2017 18:24:00                           [facility]

al [place]

 

             Respitory Rate 2017 17:45:00                           [facility]

[facility] [place]

 

             Systolic (mm Hg) 2017 17:45:00                           [facility]

rial [place]

 

             Diastolic (mm Hg) 2017 17:45:00                           Mem

[facility] [place]

 

             Temperature Oral (F) 2017 15:23:00 98 F                      

[facility]

 

             Weight       2017 15:23:00                           [facility]

 [place]

 

             BMI Calculated 2017 15:23:00                           [facility]

[facility] [place]

 

             Height       2017 15:23:00 152.4 cm                  [facility]

 [place]

 

             Heart Rate   2017 15:23:00                           [facility]

 [place]

 

             Systolic (mm Hg) 2016 18:46:00                           [facility]

rial [place]

 

             Diastolic (mm Hg) 2016 18:46:00                           [facility]

[facility] [place]

 

             Temperature Oral (F) 2016 18:46:00 99.3 F                    

[facility] [place]

 

             Heart Rate   2016 18:46:00                           [facility]

 [place]

 

             Respitory Rate 2016 18:46:00                           [facility]

[facility] [place]

 

             Systolic (mm Hg) 2016 16:35:00                           [facility]

[facility] [place]

 

             Diastolic (mm Hg) 2016 16:35:00                           [facility]

[facility] [place]

 

             Heart Rate   2016 16:35:00                           [facility]

 [place]

 

             Temperature Oral (F) 2016 16:35:00 99.9 F                    

[facility] [place]

 

             Respitory Rate 2016 16:35:00                           [facility]

al [place]

 

             Respitory Rate 2016 14:34:00                           [facility]

[facility] [place]

 

             Systolic (mm Hg) 2016 14:34:00                           [facility]

[facility] [place]

 

             Diastolic (mm Hg) 2016 14:34:00                           Mem

[facility] [place]

 

             Heart Rate   2016 14:34:00                           [facility]

 [place]

 

             Temperature Oral (F) 2016 14:34:00 102.4 F                   

[facility] [place]

 

             Weight       2016 13:24:00                           [facility]

 [place]

 

             Height       2016 13:24:00 152.4 cm                  [facility]

 [place]

 

             BMI Calculated 2016 13:24:00                           [facility]

[facility] [place]

 

             Diastolic (mm Hg) 2011-11-15 18:00:00                           Mem

[facility] [place]

 

             Systolic (mm Hg) 2011-11-15 18:00:00                           [facility]

[facility] [place]

 

             Respitory Rate 2011-11-15 18:00:00                           [facility]

[facility] [place]

 

             Heart Rate   2011-11-15 18:00:00                           [facility]

 [place]

 

             Temperature Oral (F) 2011-11-15 18:00:00 98.3 F                    

[facility]

 

             Diastolic (mm Hg) 2011-11-15 14:00:00                           Mem

[facility] [place]

 

             Respitory Rate 2011-11-15 14:00:00                           [facility]

[facility] [place]

 

             Heart Rate   2011-11-15 14:00:00                           [facility]

 [place]

 

             Temperature Oral (F) 2011-11-15 14:00:00 98.4 F                    

[facility] [place]

 

             Systolic (mm Hg) 2011-11-15 14:00:00                           [facility]ardo

rial [place]

 

             Temperature Oral (F) 2011-11-15 10:00:00 97.3 F                    

[facility]

 

             Diastolic (mm Hg) 2011-11-15 10:00:00                           Mem

[facility] [place]

 

             Heart Rate   2011-11-15 10:00:00                           [facility]

 [place]

 

             Systolic (mm Hg) 2011-11-15 10:00:00                           [facility]ardo

rial [place]

 

             Respitory Rate 2011-11-15 10:00:00                           [facility]

al [place]

 

             Weight       2011 04:00:00                           [facility]

 [place]

 

             Height       2011 04:00:00 152.4 cm                  [facility]

 [place]







Procedures





                Procedure       Date / Time Performed Performing Clinician Sour

e

 

                [QL] BASIC METABOLIC PANEL W/EGFR 2020 00:00:00          

       [facility] 

[facility]

 

                [QL] TSH, 3RD GENERATION W/REFLEX TO FT4 2020 00:00:00   

              [facility]

 

                [QLH] MAGNESIUM 2020 00:00:00                 [facility]

 

                [L] Calcium, Serum 2020 00:00:00                 [facility]

 

                    Computed tomography of brain without radiopaque contrast 201

17 00:00:00 

OSVALDO CAMPOS                         [facility]

 

                    Computed tomography of chest without contrast 2019 00:

00:00 JOJO DACOSTA                                      [facility]

 

                [[facility]] CMP W/EGFR 2019-11-15 00:00:00                 [facility]

 

                [[facility]] CBC (INCLUDES DIFF/PLT) 2019-11-15 00:00:00               

  [facility] 

[facility]

 

                [[facility]] PROTHROMBIN W/INR + PARTIAL THROMBOPLASTIN TIMES 2019-10-0

1 00:00:00                 

[facility]

 

                [[facility]] CMP W/EGFR 2019 00:00:00                 [facility]

 

                Echo (In Office) 2019 00:00:00                 [facility]

 

                [[facility]] CBC (INCLUDES DIFF/PLT) 2019 00:00:00               

  [facility]

 

                [[facility]] CMP W/EGFR 2019 00:00:00                 [facility]

 

                [[facility]] LIPID PANEL 2019 00:00:00                 [facility]

 

                [[facility]] PROTHROMBIN TIME-INR 2019 00:00:00                 [facility]

 

                [[facility]] PROTHROMBIN TIME-INR 2019 00:00:00                 [facility]

 

                    Computed tomography of chest without contrast 2019 00:

00:00 JOJO TAYLOR                                 [facility]

 

                [[facility]] PROTHROMBIN TIME-INR 2018-10-17 00:00:00                 [facility]

 

                [[facility]] PROTHROMBIN TIME-INR 2018 00:00:00                 [facility]

 

                [N] Nuclear Test-Exercise Treadmill Stress Perfusion 2018 

00:00:00                 

[facility]

 

                [[facility]] LIPID PANEL 2018 00:00:00                 [facility]

 

                [[facility]] TSH, 3RD GENERATION 2018 00:00:00                 [facility]

 

                [QL] HEMOGLOBIN A1c 2018 00:00:00                 Univers

[facility]

 

                History of Hysterectomy                                 [facility]

[facility]

 

                History of Cataract Surgery                                 Univ

[facility]

 

                History of Appendectomy                                 [facility]

 

                History of Cholecystectomy                                 [facility]

 

                History of Shoulder Surgery                                 Univ

[facility]

 

                Appendectomy                                    [facility]

 

                Cataract extraction<sup>1</sup>                                 

[facility]

 

                Cholecystectomy                                 [facility]

 

                Hysterectomy                                    [facility]

 

                Operation<sup>2</sup>                                 [facility]

 

                                        Primary decompression of thoracic spinal

 cord and fusion of joint of thoracic 

spine                                                       [facility]







Plan of Care





             Planned Activity Planned Date Details      Comments     Source

 

                    Diagnostic Test Pending 2019 00:00:00 [QL] PROTHROMBI

N TIME-INR [code = 

[QL] PROTHROMBIN TIME-INR]                           [facility]

ians

 

                    Diagnostic Test Pending 2018 00:00:00 [N] Nuclear Test

-Exercise Treadmill 

Stress Perfusion [code = [N] Nuclear Test-Exercise Treadmill Stress Perfusion]  

                                        [facility]

 

                    Diagnostic Test Pending 2018 00:00:00 [N] Nuclear Test

-Exercise Treadmill 

Stress Perfusion [code = [N] Nuclear Test-Exercise Treadmill Stress Perfusion]  

                                        [facility]

 

             Future Scheduled Test 2014 17:03:25 Plan of Care [code = 1877

6-5]              

[facility] Scheduled Test 2014 19:02:16 Plan of Care [code = 1877

6-5]              

[facility] Scheduled Test 2014 18:31:55 Plan of Care [code = 1877

6-5]              

[facility] Scheduled Test 2014 22:47:26 Plan of Care [code = 1877

6-5]              

[facility] Scheduled Test 2014 19:33:25 Plan of Care [code = 1877

6-5]              

[facility] Scheduled Test 2014 15:46:34 Plan of Care [code = 1877

6-5]              

[facility] Scheduled Test 2014 22:36:23 Plan of Care [code = 1877

6-5]              

[facility] Scheduled Test 2014 16:17:25 Plan of Care [code = 1877

6-5]              

[facility] Scheduled Test 2014-01-15 22:27:11 Plan of Care [code = 1877

6-5]              

[facility] Scheduled Test 2013 23:02:16 Plan of Care [code = 1877

6-5]              

[facility] Scheduled Test 2013 17:30:11 Plan of Care [code = 1877

6-5]              

[facility] Scheduled Test 2013 04:23:03 Plan of Care [code = 1877

6-5]              

[facility] Scheduled Test 2013 03:47:36 Plan of Care [code = 1877

6-5]              

[facility] [facility] 03:42:32 Plan of Care [code = 1877

6-5]              

[facility] Scheduled Test 2013 20:02:08 Plan of Care [code = 1877

6-5]              

[facility] Scheduled Test 2013 00:36:22 Plan of Care [code = 1877

6-5]              

[facility] Scheduled Test                 [QLH] CMP W/EGFR [code = [

QLH] CMP W/EGFR] Before next 

appointment                             [facility] Scheduled Test                     [QLH] CBC (INCLUDE

S DIFF/PLT) [code = [QLH] CBC 

(INCLUDES DIFF/PLT)]      Before next appointment   [facility]

 

                Future Scheduled Test                 [QLH] CMP W/EGFR [code = [

QLH] CMP W/EGFR] Before next 

appointment                             [facility] Scheduled Test                     [QLH] CBC (INCLUDE

S DIFF/PLT) [code = [QLH] CBC 

(INCLUDES DIFF/PLT)]      Before next appointment   [facility]

 

                Future Scheduled Test                 [QLH] CMP W/EGFR [code = [

QLH] CMP W/EGFR] Before next 

appointment                             [facility] Scheduled Test                     [QLH] CBC (INCLUDE

S DIFF/PLT) [code = [QLH] CBC 

(INCLUDES DIFF/PLT)]      Before next appointment   [facility]

 

                    Future Scheduled Test                     [QLH] BASIC METABO

LIC PANEL W/EGFR [code = [QLH] BASIC 

METABOLIC PANEL W/EGFR]   Before next appointment   [facility]

 

                    Future Scheduled Test                     [QLH] TSH, 3RD GEN

ERATION W/REFLEX TO FT4 [code = [QLH] 

TSH, 3RD GENERATION W/REFLEX TO FT4] Before next appointment   [facility]

[facility]

[facility]

 

                Future Scheduled Test                 [QLH] MAGNESIUM [code = [Q

LH] MAGNESIUM] Before next 

appointment                             [facility]

 

                Future Scheduled Test                 [L] Calcium, Serum [code =

 [L] Calcium, Serum] Before 

next appointment                        [facility]

 

                    Future Scheduled Test                     [QLH] BASIC METABO

LIC PANEL W/EGFR [code = [QLH] BASIC 

METABOLIC PANEL W/EGFR]   Before next appointment   [facility]

ns

 

                    Future Scheduled Test                     [QLH] TSH, 3RD GEN

ERATION W/REFLEX TO FT4 [code = [QLH] 

TSH, 3RD GENERATION W/REFLEX TO FT4] Before next appointment   [facility]

as

[facility]

 

                Future Scheduled Test                 [QLH] MAGNESIUM [code = [Q

LH] MAGNESIUM] Before next 

appointment                             [facility]

 

                Future Scheduled Test                 [L] Calcium, Serum [code =

 [L] Calcium, Serum] Before 

next appointment                        [facility]

 

                    Future Scheduled Test                     [QLH] BASIC METABO

LIC PANEL W/EGFR [code = [QLH] BASIC 

METABOLIC PANEL W/EGFR]   Before next appointment   [facility]

ns

 

                    Future Scheduled Test                     [QLH] TSH, 3RD GEN

ERATION W/REFLEX TO FT4 [code = [QLH] 

TSH, 3RD GENERATION W/REFLEX TO FT4] Before next appointment   [facility]

as

[facility]

 

                Future Scheduled Test                 [QLH] MAGNESIUM [code = [Q

LH] MAGNESIUM] Before next 

appointment                             [facility]

 

                Future Scheduled Test                 [L] Calcium, Serum [code =

 [L] Calcium, Serum] Before 

next appointment                        [facility]







Encounters





             Start Date/Time End Date/Time Encounter Type Admission Type Attendi

[facility]   Care Department Encounter ID    Source

 

        2020 08:00:00 2020 08:00:00 Outpatient                 [facility] 

   CAR     [id number]    [facility]

 

          2020-10-30 17:12:32 2020-10-30 21:32:00 Outpatient           [facility]                      [id number]               

 

          2020-10-30 17:12:32 2020-10-30 21:32:00 Outpatient           Guillermo Perla SE      

SE                      [id number]               

 

        2020-10-30 17:12:00 2020-10-30 17:12:00 Emergency E               [facility]  

  [facility]    7507    [facility]

 

           2020-10-16 06:12:00 2020-10-18 11:47:00 Outpatient            Al-Azze

h, Haytham Pavel [facility]

                    [facility]                [id number]         

 

           2020-10-16 06:12:00 2020-10-18 11:47:00 Outpatient            [facility]

h, Haytham Pavel [facility]

                    [facility]                [id number]         

 

           2020 00:00:00 2020-10-16 23:59:00 Outpatient            Edgar Weeks R 

[facility]                [facility]                [id number]         

 

        2020-10-16 06:12:00 2020-10-16 06:12:00 Outpatient                 [facility] 

   CAR     [id number]    [facility]

 

           2020-10-02 06:36:00 2020-10-02 12:45:00 Outpatient            [facility]

h, Haytham Pavel [facility]

                    [facility]                [id number]         

 

           2020-10-02 06:36:00 2020-10-02 12:45:00 Outpatient            [facility]

h, Haytham Pavel [facility]

                    [facility]                [id number]         

 

        2020-10-02 06:36:00 2020-10-02 06:36:00 Outpatient                 [facility] 

   CAR     [id number]    [facility]

 

        2020 00:00:00 2020 00:00:00 Outpatient                 [facility] 

   CAR     [id number]    [facility]

 

           2020 00:00:00 2020 23:59:00 Outpatient            [facility]

h, Haytham Pavel [facility]

                    [facility]                [id number]         

 

        2020 00:00:00 2020 00:00:00 Outpatient                 [facility] 

   CAR     [id number]    [facility]

 

           2020 09:45:00 2020 23:59:00 Outpatient            [facility]

h, Haytham Pavel [facility]

                    [facility]                [id number]         

 

        2020 09:45:00 2020 09:45:00 Outpatient                 [facility] 

   CAR     [id number]    [facility]

 

          2020 00:00:00 2020 23:59:00 Outpatient           Edgar Ruiz R [facility]      

[facility]                      [id number]               

 

           2020 10:43:16 2020 13:20:00 Outpatient            [facility]

h, Haytham Pavel [facility]

                    [facility]                [id number]         

 

        2020 12:40:00 2020 12:40:00 Outpatient E               MHSE 

   MED     [id number]    [facility]

 

        2020-05-15 06:23:15 2020 19:23:00 Outpatient         Maico Norman 

[facility]    [facility]    

[id number]                             

 

        2020-05-15 08:27:00 2020-05-15 08:27:00 Outpatient E               MHSE 

   MED     [id number]    [facility]

 

        2020 13:15:00 2020 13:15:00 Outpatient                 [facility] 

   CAR     7501    [facility]

 

        2020 00:00:00 2020 00:00:00 Outpatient                 [facility] 

   CAR     [id number]    [facility]

 

          2020 10:00:00 2020 23:59:00 Outpatient           Edgar Ruiz R [facility]      

[facility]                      [id number]               

 

             2020 13:00:00 2020 13:00:00 Appointment; ELISHA SALGUERO M.D. TRANG, AMANDA, M.D. [facility]             [facility]

t [id number]        

[facility]

 

        2020 10:00:00 2020 10:00:00 Outpatient                 [facility] 

   CAR     [id number]    [facility]

 

             2020 15:20:00 2020 15:20:00 Appointment; ELISHA SALGUERO M.D. TRANG, AMANDA, M.D. [facility]             [id number]        [facility]

 

             2020 15:00:00 2020 15:00:00 Appointment; ELISHA SALGUERO M.D. TRANG, AMANDA, M.D. [facility]             [facility]

t [phone]        

[facility]

 

          2020-02-10 11:21:00 2020-03-10 23:59:00 Outpatient           Edgar Ruiz R [facility]      

[facility]                      [id number]               

 

             2020 10:40:00 2020 10:40:00 Appointment; ELISHA SALGUERO M.D. TRANG, AMANDA, M.D. [facility]             [facility]

t [id number]        

[facility]

 

        2020-02-10 11:21:00 2020-02-10 11:21:00 Outpatient                 [facility] 

   CAR     [id number]    [facility]

 

          2020-01-10 10:20:00 2020 23:59:00 Outpatient           Edgar Ruiz [facility]      

[facility]                      [id number]               

 

             2020 13:12:13 2020 21:34:00 Outpatient                F

Melanie mora

                [facility]            [facility]            [id number]     

 

        2020 13:12:00 2020 13:12:00 Emergency E               [facility]  

  [facility]    7500    [facility]

 

             2020 10:00:00 2020 10:00:00 Appointment; ELISHA SALGUERO M.D. TRANG, AMANDA, M.D. [facility] [id number]

[id number]        [facility]

 

             2020 09:50:00 2020 09:50:00 Appointment; SILVER RUIZ MD MANRIQUE, CARLOS, MD [facility]             [facility] [phone]        

[facility]

 

          2020 08:45:00 2020 08:45:00 Appointment; SE, ECHO         

   SE, ECHO  UTP       

UTP                       [id number]                  [facility]

 

        2020-01-10 10:20:00 2020-01-10 10:20:00 Outpatient                 [facility] 

   CAR     [id number]    [facility]

 

          2019 10:58:00 2019 20:00:00 Outpatient           Edgar Ruiz [facility]      

[facility]                      [id number]               

 

        2019 10:58:00 2019 10:58:00 Outpatient                 [facility] 

   CAR     [id number]    [facility]

 

           2019 17:27:51 2019 13:52:00 Outpatient            LUX Deleon 

MH[facility]                [facility]                [id number]         

 

        2019 21:22:00 2019 21:22:00 Outpatient E               MHSE 

   MED     [id number]    [facility]

 

          2019 11:54:00 2019 23:59:00 Outpatient           Edgar Ruiz [facility]      

[facility]                      [id number]               

 

             2019 11:37:00 2019 15:15:00 Departed Emergency Room 1  

          CHRISTINA VARNER          [facility]          [id number]    [facility]

 

           2019 12:53:00 2019 12:53:00 Registered Clinic 3          

OSVALDO CAMPOS 

[facility]              [id number]        [facility]

 

           2019 15:53:00 2019 15:53:00 Registered Clinic 3          

JOJO TAYLOR 

[facility]              [id number]        [facility]

 

        2019 11:54:00 2019 11:54:00 Outpatient                 [facility] 

   CAR     [id number]    [facility]

 

             2019-11-15 14:40:00 2019-11-15 14:40:00 Appointment; SILVER RUIZ MD MANRIQUE, CARLOS, MD [facility]             [facility] [id number]

[id number]        

[facility]

 

             2019-11-15 14:20:00 2019-11-15 14:20:00 Appointment; SILVER RUIZ MD MANRIQUE, CARLOS, MD [facility]             [facility] [id number]        

[facility]

 

             2019 10:20:00 2019 10:20:00 Appointment; SILVER RUIZ MD MANRIQUE, CARLOS, MD [facility]             [facility] [id number]

31        

[facility]

 

          2019 11:00:00 2019 11:00:00 Appointment; SE, ECHO         

   SE, ECHO  UTP       

UTP                       [id number]                  [facility]

 

             2019 10:40:00 2019 10:40:00 Appointment; SILVER RUIZ MD MANRIQUE, CARLOS, MD [facility]             [facility] [id number]

88        

[facility]

 

             2019 14:15:00 2019 14:15:00 Appointment; MARICRUZ RAMIREZ JOSE            UTP             UTP             [id number]        [facility]

 

          2019 11:00:00 2019 11:00:00 Appointment; SE, ECHO         

   SE, ECHO  UTP       

UTP                       [id number]                  [facility]

ns

 

             2019 09:40:00 2019 09:40:00 Appointment; SILVER RUIZ MD MANRIQUE, CARLOS, MD [facility] [id number]

[id number]        

[facility]

 

             2019 09:30:00 2019 09:30:00 Appointment; SILVER RUIZ MD MANRIQUE, CARLOS, MD [facility]             [id number]        [facility]

 

           2019 16:12:00 2019 16:12:00 Registered Clinic 3          

JOJO TAYLOR 

[facility]              [id number]        [facility]

 

                    2019 08:30:00 2019 08:30:00 Appointment; RENZO ZAPATA M.D. ESCOBAR CAMARGO, JORGE, M.D. [facility]        [facility]iology at [facility]

outheast [phone]   

[facility]

 

           2019 14:00:00 2019 14:00:00 Appointment; SE, VENOUS      

       SE, VENOUS 

UTP                 UTP                 [id number]            [facility] 

[facility]

 

                    2019 14:00:00 2019 14:00:00 Appointment; RENZO ZAPATA M.D. ESCOBAR CAMARGO, JORGE, M.D. [facility]        [facility]

outheast [id number]   

[facility]

 

          2018 08:59:00 2018 23:59:00 Outpatient           Campos, S

ouheil Sadik [facility]                      [id number]               

 

                    2018-10-05 10:15:00 2018-10-05 10:15:00 Appointment; RENZO ZAPATA M.D. ESCOBAR CAMARGO, JORGE, M.D. [facility]        [facility]

outheast [phone]   

[facility]

 

          2018 10:00:00 2018 10:00:00 Outpatient           Campos, S

ouheil Sadik [facility]      

[facility]                      [id number]               

 

          2018 10:00:00 2018 10:00:00 Outpatient           Campos, S

ouheil Sadik [facility]      

[facility]                      [id number]               

 

          2018 13:01:00 2018 18:27:00 Outpatient           Guillermo Perla [facility]                      [id number]               

 

                    2018 11:00:00 2018 11:00:00 Appointment; RENZO ZAPATA M.D. ESCOBAR CAMARGO, JORGE, M.D. [facility]        [facility] [phone]   

[facility]

 

           2018 08:15:00 2018 08:15:00 Appointment; SE, NUCLEAR     

        SE, NUCLEAR 

UTP                 UTP                 [id number]            [facility] 

[facility]

 

           2018 08:00:00 2018 08:00:00 Appointment; SE, NUCLEAR     

        SE, NUCLEAR 

UTP                 UTP                 [id number]            [facility] 

[facility]

 

                    2018-02-15 13:20:00 2018-02-15 13:20:00 Appointment; RENZO ZAPATA M.D. ESCOBAR CAMARGO, JORGE, M.D. [facility]        [facility] at [facility] [id number]   

[facility]

 

          2018 13:00:00 2018 13:00:00 Appointment; SE, ECHO         

   SE, ECHO  UTP       

UTP                       [id number]                  [facility]

ns

 

                    2018 09:40:00 2018 09:40:00 Appointment; RENZO ZAPATA M.D. ESCOBAR CAMARGO, JORGE, M.D. [facility]        [facility] at [facility] [phone]   

[facility]

 

                    2018 09:50:00 2018 09:50:00 Appointment; RENZO ZAPATA M.D. ESCOBAR CAMARGO, JORGE, M.D. [facility]        [facility] at [facility] [phone]   

[facility]

 

                    2017 09:15:00 2017 09:15:00 Appointment; RENZO ZAPATA M.D. ESCOBAR CAMARGO, JORGE, M.D. [facility]        UTP        [id number]

9   [facility]

 

                    2017 09:10:00 2017 09:10:00 Appointment; RENZO ZAPATA M.D. ESCOBAR CAMARGO, JORGE, M.D. [facility]        UTP        [id number]

2   [facility] 

[facility]

 

          2017 10:28:00 2017 23:59:00 Outpatient           JANNETH Campos [facility]      

[facility]                      [id number]               

 

          2017 09:16:00 2017 12:52:00 Outpatient           Guillermo Perla [facility]      

[facility]                      [id number]               

 

                    2017 09:00:00 2017 09:00:00 Appointment; RENZO ZAPATA M.D. ESCOBAR CAMARGO, JORGE, M.D. [facility]        UTP        [id number]

0   [facility] 

[facility]

 

             2016 07:49:00 2016 23:59:00 Outpatient                E

Renzo Burger           SE            [facility]            [id number]     

 

                    2016 10:45:00 2016 10:45:00 Appointment; RENZO ZAPATA M.D. ESCOBAR CAMARGO, JORGE, M.D. [facility]        UTP        [id number]

9   [facility] 

[facility]

 

                    2016-06-15 15:00:00 2016-06-15 15:00:00 Appointment; RENZO ZAPATA M.D. ESCOBAR CAMARGO, JORGE, M.D. [facility]        UTP        [id number]

6   [facility] 

[facility]

 

                    2016-06-10 15:30:00 2016-06-10 15:30:00 Appointment; RENZO ZAPATA M.D. ESCOBAR CAMARGO, JORGE, M.D. [facility]        UTP        [id number]

4   [facility] 

[facility]

 

           2016 07:59:00 2016 13:56:00 Outpatient            Sukumar Ojeda 

[facility]                [facility]                [id number]         

 

             2016 10:33:00 2016 23:59:00 Outpatient                E

Renzo Burger           SE            SE            [id number]     

 

                    2016 14:30:00 2016 14:30:00 Appointment; RENZO ZAPATA M.D. ESCOBAR CAMARGO, JORGE, M.D. [facility]        UTP        [id number]

2   [facility] 

[facility]

 

             2016 10:53:00 2016 23:59:00 Outpatient                E

Renzo Burger 

Reinier           MHSE            [facility]            [id number]     

 

                    2016 15:10:00 2016 15:10:00 Appointment; RENZO ZAPATA M.D. ESCOBAR CAMARGO, JORGE, M.D. [facility]        UTP        [id number]

2   [facility]

 

          2016 11:00:00 2016 11:00:00 Appointment; SE, ECHO         

   SE, ECHO  UTP       

UTP                       [id number]                  [facility]

ns

 

          2016-02-15 09:56:00 2016-02-15 23:59:00 Outpatient           Campos, S

ouheil Sadik [facility]      

[facility]                      [id number]               

 

          2016 09:53:00 2016 23:59:00 Outpatient           Campos, S

ouheil Sadik [facility]      

[facility]                      [id number]               

 

          2015 09:42:00 2015 23:59:00 Outpatient           Campos, S

ouheil Sadik [facility]      

[facility]                      [id number]               

 

          2014 08:35:00 2014 23:59:00 Outpatient           Campos, S

ouheil Sadik [facility]      

[facility]                      [id number]               

 

        2014 12:03:26 2014 12:03:25 Outpatient                 [facility] 

   [facility]    [id number]  

 

        2014 14:02:17 2014 14:02:16 Outpatient                 MH[facility] 

   [facility]    [id number]  

 

        2014 13:31:55 2014 13:31:55 Outpatient                 MH[facility] 

   [facility]    [id number]  

 

        2014 16:47:27 2014 16:47:26 Outpatient                 MH[facility] 

   [facility]    [id number]  

 

        2014 13:33:26 2014 13:33:25 Outpatient                 MH[facility] 

   [facility]    [id number]  

 

        2014 09:46:34 2014 09:46:34 Outpatient                 IE 

   IE    [id number]  

 

        2014 16:36:24 2014 16:36:23 Outpatient                 IE 

   IE    [id number]  

 

        2014 10:17:26 2014 10:17:25 Outpatient                 IE 

   IE    [id number]  

 

        2014-01-15 16:27:12 2014-01-15 16:27:11 Outpatient                 IE 

   IE    [id number]  

 

        2013 17:02:16 2013 17:02:16 Outpatient                 IE 

   [facility]    [id number]  

 

        2013 11:30:12 2013 11:30:11 Outpatient                 IE 

   IE    [id number]  

 

        2013 23:23:24 2013 23:23:03 Outpatient                 IE 

   [facility]    [id number]  

 

        2013-07-15 22:47:56 2013-07-15 22:47:36 Outpatient                 IE 

   [facility]    [id number]  

 

        2013 22:42:54 2013 22:42:32 Outpatient                 IE 

   [facility]    [id number]  

 

        2013 15:02:08 2013 15:02:08 Outpatient                 IE 

   [facility]    [id number]  

 

        2013 19:36:42 2013 19:36:22 Outpatient                 IE 

   [facility]    [id number]  







Results





           Test Description Test Time  Test Comments Results    Result Comments 

Source

 

           IMMUNOLOGY 2020-10-31 01:15:00            Not Detected *NA*(10/30/20 

8:15 PM)            [facility]

 

           URINE AND STOOL 2020-10-30 23:56:00            Yellow *NA*(10/30/20 6

:56 PM)            [facility] 

[place]

 

           URINE AND STOOL 2020-10-30 23:56:00            Clear (10/30/20 6:56 P

M)            [facility] 

[place]

 

                    URINE AND STOOL     2020-10-30 23:56:00   

 

                                        Test Item

 

             UA Spec Grav (test code = UA Spec Grav) 1.015 1                    

             





[facility]00* 



             Test Item    Value        Reference Range Interpretation Comments

 

             UA pH (test code = UA pH) 6.0 1        5.0-8.0                    





[facility]2020-10-30 23:56:00Negative (10/30/20 6:56 PM)
[facility]2020-10-30 23:56:00Negative *NA*(10/30/20 6:56 
PM)[facility]2020-10-30 23:56:00Negative *NA*(10/30/20 6:56
PM)[facility]2020-10-30 23:56:00Small *ABN*(10/30/20 6:56 
PM)[facility]2020-10-30 23:56:000.2[facility]2020-10-30 23:56:00Negative (10/30/20 6:56 PM)[facility]2020-10-30 23:56:00Trace *ABN*(10/30/20 6:56 PM)[facility]2020-10-30 23:56:00None Seen (10/30/20 6:56 PM)[facility] 
[id number] 22:35:0034[facility] [id number] 22:35:00<
0.02[facility]ann[facility]M [id number] 22:35:64129Mjwrjbsi HermannCHEM PANEL
2020-10-30 22:35:0019[facility]ann[facility] [id number] 22:35:000.98[facility] 
HermannCHEM [id number] 22:35:51535Wccdrtdr HermannCHEM PANEL2020-10-30 
22:35:004.1[facility]annCHEM [id number] 22:35:31351Pshmdvvi HermannCHEM 
[id number] 22:35:0025[facility]annCHEM PANEL2020-10-30 22:35:008.9
[facility]ann[facility] PANEL2020-10-30 22:35:007.0[facility]ann[facility] PANEL
2020-10-30 22:35:003.3[facility]annCHEM [id number] 22:35:0032[facility] 
HermannCHEM PANEL2020-10-30 22:35:0021[facility] HermannCHEM [id number] 
22:35:0076[facility]ann[facility] PANEL2020-10-30 22:35:000.5[facility]ann[facility] 
PANEL2020-10-30 22:35:0015.1[facility]annCHEM PANEL2020-10-30 22:35:00* 



             Test Item    Value        Reference Range Interpretation Comments

 

             B/C Ratio (test code = B/C Ratio) 19 1         6-25                

       





[facility] RAQCJ0193-89-85 22:35:003.7[facility]annCHEM PANEL
2020-10-30 22:35:00* 



             Test Item    Value        Reference Range Interpretation Comments

 

             A/G Ratio (test code = A/G Ratio) 0.9 1        0.7-1.6             

       





[facility]annCHE[facility] LKGJG3353-51-83 22:35:0052[facility]annCHEM PANEL
2020-10-30 22:35:001.7[facility]ann[facility] KKOWQ4807-98-96 22:35:003.2[facility] 
YtlocdeMMNAZPHYZY7534-22-34 22:35:0010.9[facility] VxdgqogCQKEERZXFN0340-81-71 
22:35:003.61[facility] DxtttspVEGIUYWSFO9773-12-69 22:35:0011.2[facility]
HVAAEABPNI6509-46-21 22:35:0033.3[facility]annHEMATOLOGY2020-10-30 22:35:00
92.1[facility] XxlvmipRAXNYRUOWC8239-16-08 22:35:00* 



             Test Item    Value        Reference Range Interpretation Comments

 

             MCH (test code = MCH) 31.0 pg      27.0-31.0                  





[facility] JfatktuDZCBJMPBSX2080-49-20 22:35:0033.6[facility]annHEMATOLOGY
2020-10-30 22:35:0014.0[facility] PihhosmNDPWLHWWIP0621-36-42 22:35:49983Sznjterh 
ZitgkfkXCBPMTWGZK9395-81-04 22:35:009.6[facility]annHEMATOLOGY2020-10-30 
22:35:00* 



             Test Item    Value        Reference Range Interpretation Comments

 

             PT (test code = PT) 14.4 s       12.0-14.7                  





[facility]annHEMATOLOGY2020-10-30 22:35:00* 



             Test Item    Value        Reference Range Interpretation Comments

 

             INR (test code = INR) 1.11 1       0.85-1.17                  





[facility]annHEMATOLOGY2020-10-30 22:35:00* 



             Test Item    Value        Reference Range Interpretation Comments

 

             PTT (test code = PTT) 24.7 s       22.9-35.8                  





[facility] MkemdnxBJWEYWEFCD6135-71-65 22:35:0076.1[facility]annHEMATOLOGY
2020-10-30 22:35:0010.6[facility] NzomfnsOFREEVIBYC5090-87-88 22:35:009.6[facility] 
OjckrexWQQECTMBEV8182-45-52 22:35:003.3[facility] BltxmhzCFZTBCSIEE8651-31-96 
22:35:000.4[facility] EtjfoohQQNHFAKYKW0916-20-38 22:35:008.3[facility]
PBKSCEGKXH4586-61-17 22:35:001.1[facility]annHEMATOLOGY2020-10-30 22:35:001.0
[facility] OswuldhRACZCBISCE3076-72-71 22:35:000.4[facility]annCHEM PANEL
2020-10-16 12:08:56424Itxefgrn HermannCHEM OXZXO8706-65-68 12:08:0020[facility] 
HermannCHEM PANEL2020-10-16 12:08:001.14[facility]annCHEM OGDAK2477-81-66 
12:08:38191Hqxzhsgu HermannCHEM PANEL2020-10-16 12:08:004.1[facility]annCHEM 
QMBOZ9248-91-99 12:08:02179Bcrlfxea HermannCHEM PANEL2020-10-16 12:08:0027
[facility]annCHE[facility] PANEL2020-10-16 12:08:009.6[facility]annCHE[facility] PANEL
2020-10-16 12:08:0010.1[facility]annCHEM KILZB7819-74-44 12:08:0043[facility] 
WjhjiunSMIWZQMYQJ0706-78-56 12:08:008.2[facility] HxsqniiNTDASUQCUV6073-72-57 
12:08:003.71[facility] GkvgwlgQGMKNHBTAC8400-84-51 12:08:0011.4[facility]
JVKULZOXTF9669-47-50 12:08:0033.6[facility] EezjeopWMKKBWFOHE5328-90-79 12:08:00
90.5[facility]annHEMATOLOGY2020-10-16 12:08:00* 



             Test Item    Value        Reference Range Interpretation Comments

 

             MCH (test code = MCH) 30.6 pg      27.0-31.0                  





[facility]annHEMATOLOGY2020-10-16 12:08:0033.9[facility]annHEMATOLOGY
2020-10-16 12:08:0013.5[facility] TyxvulwJUQHILPXDV3181-24-30 12:08:85204Pgbhjbsf 
IhhfocwUUYHBNOEXT1796-19-67 12:08:009.3[facility]annHEMATOLOGY2020-10-16 
12:08:00* 



             Test Item    Value        Reference Range Interpretation Comments

 

             PT (test code = PT) 14.7 s       12.0-14.7                  





[facility]annHEMATOLOGY2020-10-16 12:08:00* 



             Test Item    Value        Reference Range Interpretation Comments

 

             INR (test code = INR) 1.14 1       0.85-1.17                  





[facility]annHEMATOLOGY2020-10-16 12:08:00* 



             Test Item    Value        Reference Range Interpretation Comments

 

             PTT (test code = PTT) 23.0 s       22.9-35.8                  





[facility]annHEMATOLOGY2020-10-16 12:08:0068.1[facility]annHEMATOLOGY
2020-10-16 12:08:0023.8[facility] DkmgtmeUAZYYOUIMD9518-85-63 12:08:006.2[facility] 
DdomrxpZJDIMJZWRM6830-27-40 12:08:001.3[facility]annHEMATOLOGY2020-10-16 
12:08:000.6[facility] GrkohptZHAEOXKTFB6562-94-20 12:08:005.6[facility]
EPCEVVFQFF0196-22-72 12:08:002.0[facility]annHEMATOLOGY2020-10-16 12:08:000.5
[facility]annHEMATOLOGY2020-10-16 12:08:000.1[facility]HEMATOLOGY
2020-10-16 12:08:000.1[facility] ZlyzccqGCXMNGZBPU8470-38-51 14:54:00Not Detected 
*NA*(10/13/20 9:54 AM)[facility]annCHEM PANEL2020-10-02 12:05:90341Ivmeybro 
HermannCHEM CYYYS7626-32-77 12:05:0022[facility]annCHE[facility] PANEL2020-10-02 
12:05:001.24[facility]annCHEM LGVVA1053-96-34 12:05:62019Vbgjygux HermannCHEM
NFPDQ0414-90-78 12:05:003.9[facility]annCHEM PANEL2020-10-02 12:05:04959
[facility]annCHE[facility] PANEL2020-10-02 12:05:0027[facility]annCHE[facility] PANEL
2020-10-02 12:05:009.9[facility]annCHEM PKGYI3651-38-69 12:05:0010.9[facility] 
HermannCHEM PANEL2020-10-02 12:05:0039[facility] GapuwjlRGNXMDLMAR6381-44-75 
12:05:009.9[facility] WottcffNZVNUGECKW4468-56-57 12:05:003.93[facility]
NRYISTIXDP2363-84-47 12:05:0012.2[facility] WscnzwmUUHOEJVRCW1740-01-21 12:05:00
35.5[facility] QimvprbDWCEMOZENL3972-11-40 12:05:0090.4[facility]annHEMATOLOGY
2020-10-02 12:05:00* 



             Test Item    Value        Reference Range Interpretation Comments

 

             MCH (test code = MCH) 30.9 pg      27.0-31.0                  





[facility] KbrtjgrHYUAAIJNNY7830-86-16 12:05:0034.2[facility]annHEMATOLOGY
2020-10-02 12:05:0013.7[facility] VodvwciPEGDJCRAVO4538-41-75 12:05:38374Vjzrmlyy 
AajsjijXZNAGARKRM5003-65-27 12:05:009.1[facility]annHEMATOLOGY2020-10-02 
12:05:00* 



             Test Item    Value        Reference Range Interpretation Comments

 

             PT (test code = PT) 15.6 s       12.0-14.7                  





[facility]annHEMATOLOGY2020-10-02 12:05:00* 



             Test Item    Value        Reference Range Interpretation Comments

 

             INR (test code = INR) 1.23 1       0.85-1.17                  





[facility]annHEMATOLOGY2020-10-02 12:05:00* 



             Test Item    Value        Reference Range Interpretation Comments

 

             PTT (test code = PTT) 24.4 s       22.9-35.8                  





[facility] EbyguosFGNVLJXYIT9709-92-44 12:05:0063.9[facility]annHEMATOLOGY
2020-10-02 12:05:0026.1[facility] LbvqnqdMAQZGVLUTG5464-32-01 12:05:007.6[facility] 
HatbjriRIZCORTUSK2027-99-28 12:05:001.8[facility]annHEMATOLOGY2020-10-02 
12:05:000.6[facility]annHEMATOLOGY2020-10-02 12:05:006.3[facility]
YZVDDNGSFK0972-58-41 12:05:002.6[facility]annHEMATOLOGY2020-10-02 12:05:000.8
[facility]annHEMATOLOGY2020-10-02 12:05:000.2[facility]annHEMATOLOGY
2020-10-02 12:05:000.1[facility] DnoktbaFEQKHEAQLS8213-63-13 14:30:00Not Detected 
*NA*(20 9:30 AM)[facility] DavfwmoOXOIGVINGX3535-89-05 15:12:00* 



             Test Item    Value        Reference Range Interpretation Comments

 

             POC INR (test code = POC INR) 3.3 1        0.9-1.2                 

   





[facility] JtcxzbpOTMZOGACOR7662-15-52 15:12:00* 



             Test Item    Value        Reference Range Interpretation Comments

 

             POC PT (test code = POC PT) 37.4 s       12.0-14.7                 

 





[facility]30[facility]00                                  
                                                   [place] [address]      Patient Name: JOCELYNE COLVIN                                
MR #: [id number]                  : 1932                               
   Age/Sex: 87/F  Acct #: [id number]                              Req #: [id number]
[id number]  Adm Physician:                                                      
Ordered by: JOJO TAYLOR MD                         Report #: [id number]     
  Location: CT                                      Room/Bed:                   
______________________________________________________
_____________________________________________    Procedure: [id number] CT/CT CHES
T WO  Exam Date: 06/15/20                            Exam Time: 1351            
                                 REPORT STATUS: Signed    CT of the chest, with
out contrast.             History: Bronchitis, lung nodules.      Comparison: CT
chest without contrast from 2019, 2019, 2019.      Technique: Mult
idetector CT scanning of the chest was performed from the level   of the apices 
to the upper abdomen without contrast.  Coronal and sagittal   multiplanar refor
mations were obtained.      RADIATION DOSE:        Total DLP: 458.30 mGy*cm     
  Dose modulation, iterative reconstruction, and/or weight based adjustment   of
the mA/kV was utilized to reduce the radiation dose to as low as reasonably   
achievable.       FINDINGS:   The thyroid and remaining visualized structures wi
thin the base of the neck   demonstrate no significant abnormalities.      The a
scending thoracic aorta is ectatic measuring up to 3.8 cm in maximal AP   diamet
er, unchanged from the prior examination. The thoracic aorta is otherwise   norm
al in course with atherosclerotic calcifications within its course and   branch 
vessels including the coronary arteries. Suspected stent noted within   the LAD.
The main pulmonary artery is prominent measuring 3.4 cm in maximal   caliber, u
nchanged. The heart is enlarged, unchanged from the prior   examination. No abno
rmal pericardial fluid is present. There is no abnormal   axillary, mediastinal,
or hilar lymph node enlargement.      The trachea and proximal airways are juan
nt. Examination of the lungs again   demonstrates stable appearing clustered/tennille
e-in-bud opacities predominantly   within the right middle lobe but also involvi
ng to a lesser extent the right   upper lobe and left lower lobe. Findings are u
nchanged from prior examinations   dating back to 2019. There is no evidence
for consolidation, pneumothorax,   mass, new/suspicious nodule, or pleural effu
niki.      Limited views of the upper abdomen demonstrate no significant abnorma
lities.   Cholecystectomy clips noted within the right upper quadrant. Partially
  visualized left renal cyst noted.      Stable moderate compression deformity 
noted of the T12 vertebral body. The   osseous structures otherwise demonstrate 
degenerative changes without evidence   for acute fracture or destructive proces
s. The extrathoracic soft tissues are   unremarkable.         IMPRESSION:      S
table appearing tree-in-bud opacities are again identified, unchanged from   jack
or examinations dating back to 2019. No new pulmonary nodule or   consolidat
ion identified.      Stable ectasia of the thoracic aorta.      Stable prominenc
e of the main pulmonary artery.      Signed by: Dr. Ramón Villagran MD on 6/15/202
0 1:47 PM        Dictated By: RAMÓN VILLAGRAN MD  Electronically Signed By: RAMÓN DRIVER MD on 06/15/20 1347  Transcribed By: BRINDA on 06/15/20 1347       COPY T
O:   JOJO TAYLOR MD, [facility]         ZPIXYJMGJE0525-78-37 15:29:00* 



             Test Item    Value        Reference Range Interpretation Comments

 

             POC INR (test code = POC INR) 2.7 1        0.9-1.2                 

   





[facility] AasnztdYWLNBUTYKV5584-75-01 15:29:00* 



             Test Item    Value        Reference Range Interpretation Comments

 

             POC PT (test code = POC PT) 30.9 s       12.0-14.7                 

 





[facility] HermannCARDIAC NDBROGY5464-29-06 08:42:00<0.02[facility]annCHEM 
KJPJJ7156-94-96 08:42:0094[facility]annCHEM PMZRL8941-62-98 08:42:0015
[facility]annCHEM OUMOK5425-53-07 08:42:000.84[facility]annCHEM PANEL
2020 08:42:45830Ekqzhsla HermannCHEM DKMGR5224-43-54 08:42:004.2[facility] 
HermannCHEM GWLBD4057-11-13 08:42:10512Mvlepklr HermannCHEM BNZZO5643-55-91 
08:42:0027[facility]annCHEM BTLRM9101-16-08 08:42:009.0[facility]annCHEM 
PFQKD7795-88-95 08:42:006.6[facility]annCHEM UXBSX7750-64-03 08:42:003.3
[facility]annCHEM DTZXI9915-94-16 08:42:0022[facility]annCHEM PANEL
2020 08:42:0021[facility]annCHEM KZGAA5091-77-11 08:42:0052[facility] 
HermannCHEM ERQKX8865-36-29 08:42:000.7[facility]annCHEM VKFHT3613-00-28 
08:42:0010.2[facility]annCHEM RQLUD8289-33-68 08:42:00* 



             Test Item    Value        Reference Range Interpretation Comments

 

             B/C Ratio (test code = B/C Ratio) 18 1         6-25                

       





[facility]annCHEM SKHJN7660-84-99 08:42:003.3[facility]annCHEM PANEL
2020 08:42:00* 



             Test Item    Value        Reference Range Interpretation Comments

 

             A/G Ratio (test code = A/G Ratio) 1.0 1        0.7-1.6             

       





[facility]annCHEM USACZ5710-79-08 08:42:0063[facility]annHEMATOLOGY
2020 08:42:009.2[facility] TbrepoyCAWRAZZTDV1150-34-94 08:42:003.43[facility] 
MjykztqOYTNYITHOD5111-82-65 08:42:0010.7[facility] MumyffeFSDNBIRBZD6383-72-49 
08:42:0030.8[facility] YcxliluXUPCFRAIGS5253-27-45 08:42:0089.9[facility]
QFKWHFWUVL1734-00-81 08:42:00* 



             Test Item    Value        Reference Range Interpretation Comments

 

             MCH (test code = MCH) 31.3 pg      27.0-31.0                  





[facility] EpmwzalDNKEWKGWPV7938-75-33 08:42:0034.8[facility]annHEMATOLOGY
2020 08:42:0013.9[facility] XmorcsnNBTZIVEOIZ3363-20-01 08:42:60197Inpsnkqg 
EqwahimYLAPLPFJQJ9605-60-01 08:42:009.3[facility] AlfjstmXGHMJSCGAL7408-46-42 
08:42:00* 



             Test Item    Value        Reference Range Interpretation Comments

 

             PTT (test code = PTT) 30.5 s       22.9-35.8                  





[facility] IlidbduMQJDLMNVSV1889-76-47 08:42:00* 



             Test Item    Value        Reference Range Interpretation Comments

 

             PT (test code = PT) 22.0 s       12.0-14.7                  





[facility] UfuosthTUJYPCGGXD0597-78-42 08:42:00* 



             Test Item    Value        Reference Range Interpretation Comments

 

             INR (test code = INR) 1.89 1       0.85-1.17                  





[facility] VencgveLEEDTPPNNB3666-02-20 08:42:0069.8[facility]annHEMATOLOGY
2020 08:42:0019.9[facility] GbsiijoDERERJRKAD6652-28-65 08:42:007.9[facility] 
LbvxpjxYBLCAFFDVJ3682-31-98 08:42:001.9[facility] FflywwjBKFAQXVNCD7309-60-34 
08:42:000.5[facility] WneprhhGDJBHPCXHP9603-81-72 08:42:006.4[facility]
JSWUTIFQIX7680-85-17 08:42:001.8[facility] LnrehxpYQBUNISJQI7069-22-97 08:42:000.7
[facility] SzsjomqUGVVYLIQLD2993-11-70 08:42:000.2[facility]annCARDIAC ENZYMES
2020 04:01:00<0.02[facility]annCARDIAC RKEQOCJ4126-45-60 15:58:0095
[facility]annCARDIAC XYCKHNF2217-27-11 15:58:00<0.02[facility]annCARDIAC 
OYLDTAJ6303-63-74 15:58:0060[facility]annCHEM KZCKX1422-02-81 15:58:0096
[facility]annCHEM GFOBR3659-15-89 15:58:0018[facility]annCHEM PANEL
2020 15:58:001.04[facility]annCHEM BUEWH2480-42-56 15:58:09663Aywoweep 
HermannCHEM DLDNG7542-11-52 15:58:004.3[facility]annCHEM BAFNQ9252-36-42 
15:58:04786Xlkzanit HermannCHEM AKFDR8917-43-51 15:58:0022[facility]annCHEM 
UVDAS8726-92-96 15:58:009.4[facility]annCHEM BUSJE5818-12-29 15:58:007.7
[facility]annCHEM MSBCX5012-93-05 15:58:003.8[facility]annCHEM PANEL
2020 15:58:0022[facility]annCHEM HXOSN3331-34-17 15:58:0024[facility] 
HermannCHEM CPSJC1595-32-29 15:58:0058[facility]annCHEM LWMOF1384-92-57 
15:58:000.5[facility]annCHEM UFVKJ8583-13-70 15:58:0011.3[facility]annCHEM
CSFXO6419-72-75 15:58:00* 



             Test Item    Value        Reference Range Interpretation Comments

 

             B/C Ratio (test code = B/C Ratio) 17 1         6-25                

       





[facility]annCHEM EQUVC2698-08-89 15:58:003.9[facility]annCHEM PANEL
2020 15:58:00* 



             Test Item    Value        Reference Range Interpretation Comments

 

             A/G Ratio (test code = A/G Ratio) 1.0 1        0.7-1.6             

       





[facility]annCHEM VWRYX6647-62-46 15:58:0048[facility]annCHEM PANEL
2020 15:58:002.2[facility] VhwcnekMCBGKXQMDU6695-39-72 15:58:0011.0[facility] 
KcmzaodIDCVXPRFXS7696-03-28 15:58:003.86[facility] CbhicneXQFCNBJAWF1501-74-42 
15:58:0011.9[facility] JchdhesGJRYQVUDHJ4288-27-16 15:58:0035.1[facility]
ADHAYJJKPB5556-16-30 15:58:0090.9[facility] OywqbopNQUEXLVBUU2328-32-08 15:58:00* 



             Test Item    Value        Reference Range Interpretation Comments

 

             MCH (test code = MCH) 30.9 pg      27.0-31.0                  





[facility] ZcallipTARLCXCGVH3791-50-27 15:58:0034.0[facility]annHEMATOLOGY
2020 15:58:0014.2[facility] WwwyxreEIIWFPKRGX2021-82-89 15:58:34750Yvscmvzu 
TcwjquuDOCLACJHJE0673-73-89 15:58:009.8[facility] RozggyqZMNHPCGSMV9500-70-67 
15:58:00* 



             Test Item    Value        Reference Range Interpretation Comments

 

             PT (test code = PT) 21.2 s       12.0-14.7                  





[facility] CjhsabiMUDAUZPCPT9827-59-15 15:58:00* 



             Test Item    Value        Reference Range Interpretation Comments

 

             INR (test code = INR) 1.81 1       0.85-1.17                  





[facility] ScrczujWETAZBZRJW4486-28-02 15:58:00* 



             Test Item    Value        Reference Range Interpretation Comments

 

             PTT (test code = PTT) 26.2 s       22.9-35.8                  





[facility] PtybbnvLKIVDAJZBP1025-35-92 15:58:0069.2[facility]annHEMATOLOGY
2020 15:58:0022.3[facility] QzayrugOYDILAQGOQ1435-45-84 15:58:007.2[facility] 
RhlpcdkPKPXWXQNEF3999-37-64 15:58:000.9[facility] MrmxhtnGNELHDMKME6030-58-90 
15:58:000.4[facility] SrwxvwxPLCAXTWMDW9218-65-75 15:58:007.6[facility]
NABHEZJBHB2661-40-60 15:58:002.5[facility] UbzftqhOQHCQKVHIJ8779-68-34 15:58:000.8
[facility] BxdwygmEGKISYLLAO2178-52-03 15:58:000.1[facility]annHEMATOLOGY
2020 12:54:00* 



             Test Item    Value        Reference Range Interpretation Comments

 

             PT (test code = PT) 20.8 s       12.0-14.7                  





[facility] CsxotqjIZWYYOEMPY8251-56-33 12:54:00* 



             Test Item    Value        Reference Range Interpretation Comments

 

             INR (test code = INR) 1.77 1       0.85-1.17                  





[facility] JtczaaqPMFBPG9237-32-01 12:54:45413Zjowfokc DreewatMLDVOV3525-73-95 
12:54:87566Vetfuqiq YdniqhhAHFMNX9975-16-90 12:54:0058[facility] HermannLIPIDS
2020 12:54:00* 



             Test Item    Value        Reference Range Interpretation Comments

 

             CHD Risk (test code = CHD Risk) 2.16 1       3.90-5.80             

     





[facility] KdgheiiOZLOBL2531-88-58 12:54:0043[facility] CacgohqLEHGGC3338-55-48 
12:54:00* 



             Test Item    Value        Reference Range Interpretation Comments

 

             VLDL (test code = VLDL) 24 1                                    





[facility]annCHEM PANEL2020-05-15 22:06:000.87[facility]annCHEM PANEL
2020-05-15 22:06:0060[facility] BuhxvtxVWOIONTFQO9096-81-45 22:06:00* 



             Test Item    Value        Reference Range Interpretation Comments

 

             PT (test code = PT) 20.4 s       12.0-14.7                  





[facility]annHEMATOLOGY2020-05-15 22:06:00* 



             Test Item    Value        Reference Range Interpretation Comments

 

             INR (test code = INR) 1.72 1       0.85-1.17                  





[facility] TwccqvfZFOXWSOGGP2857-96-04 22:06:0011.9[facility] HermannURINE AND STOOL
2020-05-15 13:23:00Clear (5/15/20 8:23 AM)[facility]ann[facility] STOOL
2020-05-15 13:23:00* 



             Test Item    Value        Reference Range Interpretation Comments

 

             UA Spec Grav (test code = UA Spec Grav) 1.005 1                    

             





[facility]annURINE [facility] STOOL2020-05-15 13:23:00* 



             Test Item    Value        Reference Range Interpretation Comments

 

             UA pH (test code = UA pH) 8.0 1        5.0-8.0                    





[facility]annURINE AND STOOL2020-05-15 13:23:00Negative *NA*(5/15/20 8:23 AM)
[facility]annURINE [facility] STOOL2020-05-15 13:23:00Negative (5/15/20 8:23 AM)
[facility]annURINE AND STOOL2020-05-15 13:23:004[facility]annCARDIAC 
ZIAHFEA8383-73-66 12:23:0067[facility] HermannCARDIAC ASESQTH4509-36-00 12:23:00<
0.02[facility] HermannCARDIAC UFJCIVZ9184-11-56 12:23:0065[facility]annCHEM 
DFBUI9477-57-23 12:23:63760Xossiauf HermannCHEM PANEL2020-05-15 12:23:0018
[facility]annCHEM PANEL2020-05-15 12:23:001.09[facility]annCHEM PANEL
2020-05-15 12:23:94650Fsownzts HermannCHEM NCZEG2988-97-97 12:23:003.9[facility] 
HermannCHEM IWROB2576-43-71 12:23:15437Psaxpmck HermannCHEM PANIS4448-78-64 
12:23:0022[facility]annCHEM PANEL2020-05-15 12:23:009.5[facility]annCHEM 
PANEL2020-05-15 12:23:007.4[facility]annCHEM PANEL2020-05-15 12:23:003.7
[facility]annCHEM KFAJZ5279-35-95 12:23:0024[facility]annCHEM PANEL
2020-05-15 12:23:0027[facility]annCHEM DDKIJ7262-94-31 12:23:0053[facility] 
HermannCHEM PANEL2020-05-15 12:23:000.7[facility] HermannCHEM VRYJM4062-76-83 
12:23:0014.9[facility]annCHEM PANEL2020-05-15 12:23:00* 



             Test Item    Value        Reference Range Interpretation Comments

 

             B/C Ratio (test code = B/C Ratio) 17 1         6-25                

       





[facility] HermannCHEM QPPOV4319-93-87 12:23:003.7[facility]annCHEM PANEL
2020-05-15 12:23:00* 



             Test Item    Value        Reference Range Interpretation Comments

 

             A/G Ratio (test code = A/G Ratio) 1.0 1        0.7-1.6             

       





[facility] HermannCHEM FRWMK9505-81-03 12:23:0046[facility] HermannCHEM PANEL
2020-05-15 12:23:002.0[facility] HermannCHEM PANEL2020-05-15 12:23:001.9[facility] 
ZtbfakwUPJPEIMNXA1114-27-62 12:23:0010.7[facility] VafuulyNXMJHQYYYM3895-32-48 
12:23:003.98[facility] EyrasjkHMWBUNGYGC2077-72-74 12:23:0012.1[facility]
DLNOVMCLHV9852-14-66 12:23:0035.6[facility] WngvznqZUENTRJFWR1831-92-58 12:23:00
89.6[facility]annHEMATOLOGY2020-05-15 12:23:00* 



             Test Item    Value        Reference Range Interpretation Comments

 

             MCH (test code = MCH) 30.5 pg      27.0-31.0                  





[facility] NjfjgznMYDNLOKFHV8854-60-69 12:23:0034.1[facility]
2020-05-15 12:23:0014.2[facility] IulvethJJYCNMPPWH8374-83-49 12:23:66054Bmtwmvjc 
BbnpccgPRJNXSRJVQ2993-82-01 12:23:0010.1[facility] WbldoccALRTBRMBZV5666-65-04 
12:23:00<0.27[facility]2020-[facility]-15 12:23:00* 



             Test Item    Value        Reference Range Interpretation Comments

 

             PT (test code = PT) 19.5 s       12.0-14.7                  





[facility] 12:23:00* 



             Test Item    Value        Reference Range Interpretation Comments

 

             INR (test code = INR) 1.63 1       0.85-1.17                  





[facility] 12:23:00* 



             Test Item    Value        Reference Range Interpretation Comments

 

             PTT (test code = PTT) 29.0 s       22.9-35.8                  





[facility] KrukhvvAQVMBKVQGB4398-21-96 12:23:0071.4[facility]
2020-05-15 12:23:0020.6[facility] MdrntdjPZCOQRSHZJ4021-21-20 12:23:006.1[facility] 
ZnzlmhdMHHMAGUHQE8539-98-48 12:23:001.3[facility]2020-05-15 
12:23:000.6[facility] YaavivoUCNRKEIRPN0219-23-80 12:23:007.6[facility]
UUSNNSKQRN6255-99-85 12:23:002.2[facility]2020-05-15 12:23:000.7
[facility] 12[facility]23:000.1[facility]
2020-05-15 12:23:000.1[facility] IacorgsOVOZZIARYX1814-65-38 15:02:00* 



             Test Item    Value        Reference Range Interpretation Comments

 

             POC INR (test code = POC INR) 4.0 1        0.9-1.2                 

   





[facility] HospitalSdfoepnLSUISRJWIA5545-60-79 15:02:00* 



             Test Item    Value        Reference Range Interpretation Comments

 

             POC PT (test code = POC PT) 45.0 s       12.0-14.7                 

 





[facility] HospitalGzwtovxTGVYBENGFD3047-23-03 15:32:00* 



             Test Item    Value        Reference Range Interpretation Comments

 

             POC INR (test code = POC INR) 2.8 1        0.9-1.2                 

   





[facility] HospitalBlvdnzpDZEPYLFSJC3684-85-54 15:32:00* 



             Test Item    Value        Reference Range Interpretation Comments

 

             POC PT (test code = POC PT) 32.2 s       12.0-14.7                 

 





[facility] HospitalPivcjtvALTJLRWLFZ2923-67-97 16:34:00* 



             Test Item    Value        Reference Range Interpretation Comments

 

             POC INR (test code = POC INR) 2.9 1        0.9-1.2                 

   





[facility] HospitalUobvmrgIIKQSRSUVE8923-27-77 16:34:00* 



             Test Item    Value        Reference Range Interpretation Comments

 

             POC PT (test code = POC PT) 32.9 s       12.0-14.7                 

 





[facility] HospitalEhrfdjaAFUOCBXRHG7945-61-53 17:23:00* 



             Test Item    Value        Reference Range Interpretation Comments

 

             POC INR (test code = POC INR) 1.5 1        0.9-1.2                 

   





[facility] HospitalNqdxqtuUQTSLILUMS0170-56-87 17:23:00* 



             Test Item    Value        Reference Range Interpretation Comments

 

             POC PT (test code = POC PT) 17.5 s       12.0-14.7                 

 





[facility] HospitalHdqtaseJDMBRLMKRW4434-36-14 16:07:00* 



             Test Item    Value        Reference Range Interpretation Comments

 

             POC INR (test code = POC INR) 1.3 1        0.9-1.2                 

   





[facility] HospitalHfwjvjxSQUDEWTOPR4425-15-98 16:07:00* 



             Test Item    Value        Reference Range Interpretation Comments

 

             POC PT (test code = POC PT) 15.9 s       12.0-14.7                 

 





[facility]ann[facility]ARDI[facility] SDPORRX5728-89-52 20:31:0034[facility]annCARD[facility]AC 
UALWCNE8063-49-05 20:31:00<0.02[facility]annCHEM XLONQ0553-56-49 20:31:38127
[facility]annCHEM QRBWZ0893-94-41 20:31:0022[facility]annCHEM PANEL
2020 20:31:000.94[facility]annCHEM RNKOB9658-73-27 20:31:05940Baoshxvu 
HermannCHEM AWTPH8068-97-53 20:31:003.9[facility]annCHEM SCDAX3276-83-25 
20:31:15059Vpqsdpsi HermannCHEM IYXIZ0294-18-03 20:31:0026[facility]annCHEM 
WJPEQ2354-69-19 20:31:009.1[facility]annCHEM HWMFI3614-07-84 20:31:006.6
[facility]annCHEM CGVCP8324-04-07 20:31:003.4[facility]annCHEM PANEL
2020 20:31:0023[facility] HermannCHEM AMKSM9550-77-52 20:31:0025[facility] 
HermannCHEM DXGXC8811-32-04 20:31:0053[facility]annCHEM TCTXP3219-26-01 
20:31:000.2[facility]annCHEM KLKDD8666-85-77 20:31:007.9[facility]annCHEM 
TOOJU8311-04-99 20:31:00* 



             Test Item    Value        Reference Range Interpretation Comments

 

             B/C Ratio (test code = B/C Ratio) 23 1         6-25                

       





[facility]annCHEM EEJYZ1595-39-52 20:31:003.2[facility] HermannCHEM PANEL
2020 20:31:00* 



             Test Item    Value        Reference Range Interpretation Comments

 

             A/G Ratio (test code = A/G Ratio) 1.1 1        0.7-1.6             

       





[facility]annCHEM NWXWY8035-29-10 20:31:0055[facility]annCHEM PANEL
2020 20:31:86034Pitlxtlf HdmjaokTLRGEETESS4887-20-25 20:31:007.7[facility] 
CqvplccUAUPIQZQQW7313-09-72 20:31:003.76[facility] LnsyqycESTNTYITNL3068-88-64 
20:31:0011.4[facility] PfqkgogBLXFQPTSQY4429-06-80 20:31:0033.9[facility]
ISWRFFXITV4903-99-60 20:31:0090.0[facility] TacssowEALKBVRMGF0663-11-06 20:31:00* 



             Test Item    Value        Reference Range Interpretation Comments

 

             MCH (test code = MCH) 30.3 pg      27.0-31.0                  





[facility] ZctakntNPYSJZFJVU6583-92-54 20:31:0033.7[facility] HermannHEMATOLOGY
2020 20:31:0014.4[facility] DcdilacKEZQQHGNPF2444-58-35 20:31:80611Vfhowaop 
UvmredaMROKWGAIMU9893-34-73 20:31:0010.4[facility] ClktoqoVGEWTBHLVK2572-53-97 
20:31:00* 



             Test Item    Value        Reference Range Interpretation Comments

 

             PT (test code = PT) 16.6 s       12.0-14.7                  





[facility] OuigttlRCFYSTYTME1483-97-30 20:31:00* 



             Test Item    Value        Reference Range Interpretation Comments

 

             INR (test code = INR) 1.33 1       0.85-1.17                  





[facility] UwljjtzOLSWUIPVKV8175-96-71 20:31:00* 



             Test Item    Value        Reference Range Interpretation Comments

 

             PTT (test code = PTT) 25.7 s       22.9-35.8                  





[facility] EljrnpyIGJCWGXDKV9217-56-05 20:31:0077.3[facility] HermannHEMATOLOGY
2020 20:31:0013.6[facility] ZbtktvdYIAVQTYTLZ0995-47-05 20:31:007.0[facility] 
TtlcurtDZHQEQBYUH4830-69-49 20:31:001.7[facility] SsojbhtCSHKDSDOPZ2068-20-54 
20:31:000.4[facility] CaitoydLIKZBNCPKI2383-47-56 20:31:005.9[facility] [place]
OPKLEFILOB2445-61-43 20:31:001.0[facility] KnzffzeMPYHEHKOGZ8284-35-42 20:31:000.5
[facility] SrjhdsfDDWSTLVNNW0569-51-18 20:31:000.1[facility] HermannURINE AND STOOL
2020 20:31:00Clear (20 2:31 PM)[facility]annURINE AND STOOL
2020 20:31:00* 



             Test Item    Value        Reference Range Interpretation Comments

 

             UA Spec Grav (test code = UA Spec Grav) 1.004 1                    

             





[facility]annURINE AND FAUAU9467-86-20 20:31:00* 



             Test Item    Value        Reference Range Interpretation Comments

 

             UA pH (test code = UA pH) 7.0 1        5.0-8.0                    





[facility]annUR[facility] AND TMEKT1313-82-81 20:31:00Negative *NA*(20 2:31 PM)
[facility] VPNKK4915-09-96 20:31:00Small *ABN*(20 2:31 PM)
[facility] RRTZH2006-78-47 20:31:00Negative (20 2:31 PM)
[facility] ALBTA3625-11-03 20:31:00Negative (20 2:31 PM)
[facility] JNICH3598-12-92 20:31:00<1[facility]annUR[facility] AND 
CJBLA6708-12-73 20:31:001[facility] EaewqtxLUVXTPIMOL7588-64-45 15:31:00* 



             Test Item    Value        Reference Range Interpretation Comments

 

             POC INR (test code = POC INR) 1.6 1        0.9-1.2                 

   





[facility] RrfjwtzHATHSNUTCV6503-23-13 15:31:00* 



             Test Item    Value        Reference Range Interpretation Comments

 

             POC PT (test code = POC PT) 19.0 s       12.0-14.7                 

 





[facility]] BASIC METABOLIC PANEL W/EROH8287-26-97 11:10:01* 



             Test Item    Value        Reference Range Interpretation Comments

 

             Glucose Lvl (test code = 2345-7) 95 mg/dl     70-99                

     Adult reference range values 

reflect the clinical guidelinesof the American Diabetes Association.

 

             Blood Urea Nitrogen (test code = 3094-0) 20 mg/dl     7-22         

              

 

             Creatinine Lvl (test code = 2160-0) 1.20 mg/dl   0.50-1.40         

         

 

             Sodium Level (test code = 2951-2) 142 {mEq/l}  135-145             

       

 

             Potassium Level (test code = 2823-3) 4.9 {mEq/l}  3.5-5.1          

          

 

             Chloride Level (test code = 2075-0) 106 {mEq/l}              

         

 

             Carbon Dioxide (test code = -9) 30 {mEq/l}   24-32             

         

 

             AGAP (test code = 33037-3) 10.9 {mEq/l} 10.0-20.0                  

 

             Calcium Level Total (test code = 08682-2) 9.9 mg/dl    8.5-10.5    

               

 

             eGFR (test code = 15833-5) 41 {ML/MIN/1.7}                         

  The eGFR is calculated using the 

CKD-EPI formula. In most young, healthyindividuals the eGFR will be >90 
mL/min/1.73m2. The eGFR declines with age. AneGFR of 60-89 may be normal in some
 populations, particularly the elderly, forwhom the CKD-EPI formula has not been
 extensively validated. Use of the eGFR isnot recommended in the following 
populations:Individuals with unstable creatinine concentrations, including 
pregnantpatients and those with serious co-morbid conditions.Patients with 
extremes in muscle mass or diet.The data above are obtained from the National 
Kidney Disease Education Program(NKDEP) which additionally recommends that when 
the eGFR is used in patientswith extremes of body mass index for purposes of 
drug dosing, the eGFR shouldbe multiplied by the estimated BMI.





[facility]] SXLIAYDYM3913-10-88 11:10:01* 



             Test Item    Value        Reference Range Interpretation Comments

 

             Magnesium Level (test code = 42405-4) 1.8 mg/dl    1.8-2.4         

           





[facility]] TSH, 3RD GENERATION W/REFLEX TO FT4
2020 11:10:01* 



             Test Item    Value        Reference Range Interpretation Comments

 

             TSH (test code = 52701-6) 2.500 {uIU/ml} 0.360-3.740               

 





[facility][] FZJFXJL7660-05-44 11:04:01* 



             Test Item    Value        Reference Range Interpretation Comments

 

             Calcium Level Total (test code = 18561-9) Cancel Reason: System Can

toribio                            





[facility] KlhjytrettKTGUJPEHHD7491-44-76 17:03:00* 



             Test Item    Value        Reference Range Interpretation Comments

 

             POC INR (test code = POC INR) 3.9 1        0.9-1.2                 

   





[facility] CampusElqawkzNITGHNFOKP1646-38-61 17:03:00* 



             Test Item    Value        Reference Range Interpretation Comments

 

             POC PT (test code = POC PT) 43.6 s       12.0-14.7                 

 





[facility]ann[facility]ARD[facility] VZYDBDO6911-73-44 15:56:00<0.02[facility] HermannCHEM 
HHJAH1429-13-99 09:15:001.6[facility] HermannCHEM BEQVB5876-68-43 09:15:003.2
[facility] HermannCHEM HGRFD8072-69-53 09:15:0097[facility] HermannCHEM PANEL
2019 09:15:0015[facility] HermannCHEM YKXPE1319-73-92 09:15:000.71[facility] 
HermannCHEM EQRRG5825-05-01 09:15:11047Innnoehk HermannCHEM OFCWL3171-73-36 
09:15:003.4[facility] HermannCHEM UFSUA0682-47-55 09:15:13789Gitwcqaw HermannCHEM 
HKYGC1166-21-23 09:15:0030[facility] HermannCHEM CTLIV0439-99-58 09:15:009.4
[facility] HermannCHEM VPPVH8737-54-34 09:15:008.0[facility] HermannCHEM PANEL
2019 09:15:00* 



             Test Item    Value        Reference Range Interpretation Comments

 

             B/C Ratio (test code = B/C Ratio) 21 1         6-25                

       





[facility] HermannCHE[facility] FIWRI8445-27-14 09:15:006.7[facility] HermannCHEM PANEL
2019 09:15:002.9[facility] HermannCHEM QNGZX3783-40-03 09:15:003.8[facility] 
HermannCHEM QCCER4024-96-61 09:15:00* 



             Test Item    Value        Reference Range Interpretation Comments

 

             A/G Ratio (test code = A/G Ratio) 0.8 1        0.7-1.6             

       





[facility] HermannCHEM LBAPF0826-60-73 09:15:0020[facility] HermannCHEM PANEL
2019 09:15:0020[facility] HermannCHEM PTQZT7197-36-46 09:15:0063[facility] 
HermannCHEM RUIBD8873-09-44 09:15:000.4[facility] HermannCHEM VOZGJ4303-66-91 
09:15:0077[facility] WncsrnnXXQDPFKKRO3350-75-75 09:15:008.4[facility] [place]
OFIXFZBBFC8113-69-37 09:15:003.60[facility] ClsmatmHPCZWSZTJU5332-93-09 09:15:00
10.6[facility] LhewnycHBESERMPPI5368-61-00 09:15:0031.6[facility] HermannHEMATOLOGY
2019 09:15:0087.9[facility] QyogkybPCHHOEUEEE3628-63-08 09:15:00* 



             Test Item    Value        Reference Range Interpretation Comments

 

             MCH (test code = MCH) 29.4 pg      27.0-31.0                  





[facility] HospitalWzlennyGACVHOBCHK4417-17-47 09:15:0033.4[facility] HermannHEMATOLOGY
2019 09:15:0013.6[facility] TbwweokSSKJGHMHZD3387-06-46 09:15:09669Ssccjzrh 
BkmvvnxRJSVGVQHQH4430-51-27 09:15:009.8[facility] NphixhcJIIPWSCKXD2828-78-73 
09:15:00* 



             Test Item    Value        Reference Range Interpretation Comments

 

             PTT (test code = PTT) 43.4 s       22.9-35.8                  





[facility] HospitalAjjfarcGOBRUWCVPS6229-64-02 09:15:00* 



             Test Item    Value        Reference Range Interpretation Comments

 

             PT (test code = PT) 33.0 s       12.0-14.7                  





[facility] JiofghrJBCJYSVEZQ4090-27-10 09:15:00* 



             Test Item    Value        Reference Range Interpretation Comments

 

             INR (test code = INR) 3.14 1       0.85-1.17                  





[facility] HospitalannCHEM CSEVB5282-72-12 05:04:0035.8[facility] HermannPARATHYROID 
UEEFDHO8288-84-28 05:04:91167.6[facility] HermannPARATHYROID KZURORW6512-52-37 
05:04:000.88[facility] HermannPARATHYROID PLLPAHF6841-54-09 05:04:000.89[facility] 
HermannCARDIAC RZGWNDM3687-77-26 00:09:00<0.02[facility] HermannCARDIAC ENZYMES
2019 00:09:98619Szosxojq HermannCHEM XWHSY2154-92-02 00:09:38939Epiescsa 
HermannCHEM YUFNO8082-47-99 00:09:0017[facility] HermannCHEM INPAC0912-37-23 
00:09:000.95[facility] HermannCHEM UIPRZ5514-07-21 00:09:42921Gwvtrsqs HermannCHEM
 HQVQI5285-13-29 00:09:003.7[facility] HermannCHEM HCLAL0699-24-77 00:09:65040
[facility] HermannCHEM EJZJG6343-59-04 00:09:0025[facility] HermannCHEM PANEL
2019 00:09:0012.7[facility] HermannCHEM YDYLX2160-66-26 00:09:006.9[facility] 
HermannCHEM FUIDL0296-87-17 00:09:00* 



             Test Item    Value        Reference Range Interpretation Comments

 

             B/C Ratio (test code = B/C Ratio) 18 1         6-25                

       





[facility] HermannCHEM HHIPZ3646-02-32 00:09:007.7[facility] HermannCHEM PANEL
2019 00:09:003.2[facility] HermannCHEM XNUOL6217-99-26 00:09:004.5[facility] 
HermannCHEM QRYTB2230-48-65 00:09:00* 



             Test Item    Value        Reference Range Interpretation Comments

 

             A/G Ratio (test code = A/G Ratio) 0.7 1        0.7-1.6             

       





[facility] HermannCHEM YMBCX1953-74-57 00:09:0026[facility] HermannCHEM PANEL
2019 00:09:0033[facility] HermannCHEM BNARW6667-56-73 00:09:0076[facility] 
HermannCHEM IZEEI2920-31-30 00:09:000.4[facility] HermannCHEM QYKTR2470-27-11 
00:09:0054[facility] HermannCHEM KNJUZ8683-03-50 00:09:22578Uhukdpcy HermannCHEM 
SBQQI1395-00-14 00:09:000.8[facility] HermannCHEM KJVJT7852-42-05 00:09:002.6
[facility] WrjmvwhXJCKBUZOHJ7101-05-71 00:09:0011.5[facility] HermannHEMATOLOGY
2019 00:09:004.03[facility] HqjofetWQHZPDMSBL0647-48-66 00:09:0012.0[facility]
 TvbwjuqMZDSJTAMJX6412-53-94 00:09:0036.0[facility] NeywmeuNPABWMELGJ0183-85-17 
00:09:0089.1[facility] UswmqmxCHRICLSQCF9132-79-90 00:09:00* 



             Test Item    Value        Reference Range Interpretation Comments

 

             MCH (test code = MCH) 29.7 pg      27.0-31.0                  





[facility] IiiqiwdFBHIKORQFY7603-92-36 00:09:0033.3[facility] HermannHEMATOLOGY
2019 00:09:0013.7[facility] SwdhpsuKXVZACWLMA3087-02-89 00:09:32655Fmlnzvgg 
JvcfqewBNTHCILNEP0610-26-64 00:09:0010.1[facility] FmzugyoLOEECDCZKF7994-14-89 
00:09:00* 



             Test Item    Value        Reference Range Interpretation Comments

 

             PT (test code = PT) 34.5 s       12.0-14.7                  





[facility] HospitalOuecpmoXENMPXBQSJ1009-01-61 00:09:00* 



             Test Item    Value        Reference Range Interpretation Comments

 

             INR (test code = INR) 3.32 1       0.85-1.17                  





[facility] VclssrzEMWTBRTQBQ0416-01-99 00:09:00* 



             Test Item    Value        Reference Range Interpretation Comments

 

             PTT (test code = PTT) 34.8 s       22.9-35.8                  





[facility] HospitalKjximlfVDZHNCGVIF1473-37-80 00:09:0061.1[facility] HermannHEMATOLOGY
2019 00:09:0027.2[facility] JkeecmuKCDXVOZZMS0911-36-26 00:09:008.1[facility] 
UiurkrjOGCHQIOQYF6398-82-35 00:09:002.7[facility] BrzbafvLXXLYFUSFN0572-54-81 
00:09:000.9[facility] UqhdutaCWWVYPDYPQ2772-86-68 00:09:007.0[facility] [place]
QBHYWDACTG0581-21-05 00:09:003.1[facility] HmtrqslBTHDHFXOXZ8896-53-30 00:09:000.9
[facility] GlaoclzRHXDXBDVLK6322-49-97 00:09:000.3[facility] HermannHEMATOLOGY
2019 00:09:000.1[facility] HermannUric Pcjz2280-95-37 14:20:00* 



             Test Item    Value        Reference Range Interpretation Comments

 

             Uric Acid (test code = 3084-1) 5.7          2.6-8.0                

    





[facility] CSOYE8981-80-51 14:04:00  
                                                                                
    [place] [address]      Patient Name: JOCELYNE COLVIN   
                                MR #: [id number]                     : 1932                                   Age/Sex: 87/F  Acct #: [id number]      
                        Req #: [id number]  Adm Physician:                       
                               Ordered by: CHRISTINA VARNER MD                
            Report #: [id number]        Location: ER                             
         Room/Bed:                     _________________________________________
__________________________________________________________    Procedure: 1220-00
50 DX/WRIST COMPLETE RIGHT  Exam Date: 19                            Exam 
Time: 1339                                              REPORT STATUS: Signed   
 TECHNIQUE: Frontal, oblique, and lateral views of the right wrist.      INDICAT
ION: 87-year-old woman with pain and swelling.      COMPARISON: None.      FINDI
NGS:   Abnormal osseous structure adjacent to trapezium and second metacarpal.  
 Otherwise, no acute fractures or dislocations.   Degenerative changes at the fi
rst carpometacarpal joint. Other joint spaces are   within normal limits.   Calc
ifications in the region of the triangular fibrocartilage complex.   Soft tissue
s are grossly unremarkable.      IMPRESSION:   Abnormal osseous structure adjace
nt to the trapezium and second metacarpal may   represent an osteophyte or seque
la of age-indeterminate trauma.      Degenerative changes at the first carpometa
carpal joint.      Signed by: Ana Lilia Lane MD on 2019 2:12 PM        Di
ctated By: ANA LILIA LANE MD  Electronically Signed By: ANA LILIA LANE MD on  141  Transcribed By: BRINDA on 19       COPY TO:   CRISTIANE VARNER MD         CT BRAIN [id number] 14:02:00                               
                                                       [place] [address]      Patient Name: JOCELYNE COLVIN                      
             MR #: [id number]                     : 1932               
                    Age/Sex: 87/F  Acct #: [id number]                         
     Req #: [id number]  Adm Physician:                                          
            Ordered by: OSVALDO CAMPOS MD                            Report #: 
[id number]        Location: CT                                      Room/Bed:    
                 ____________________________________________
_______________________________________________________    Procedure: [id number] 
CT/CT BRAIN WO  Exam Date: 19                            Exam Time: 1339  
                                            REPORT STATUS: Signed    History:Fal
l, syncope   Comparison studies:None      Technique:   Axial images were obtaine
d from the skull base to the vertex.   Coronal and sagittal images reconstructed
 from the axial data.   Intravenous contrast: None   Dose modulation, iterative 
reconstruction, and/or weight based adjustment of   the mA/kV was utilized to re
duce the radiation dose to as low as reasonably   achievable.      Findings:    
  Scalp/skull:    No abnormalities.      Extra-axial spaces:    No masses.  No f
luid collections.      Brain sulci: Mildly prominent.   Ventricles: Mild compens
atory dilatation. No hydrocephalus.      Parenchyma:    Few hypodensities in the
 supratentorial white matter are small vessel ischemic   changes. Small chronic 
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 No   masses, hemorrhage, acute or
 chronic cortical vascular insults.      Sellar/suprasellar region: No abnormali
ties.   Craniocervical junction: Patent foramen magnum.  No Chiari one malformat
ion.      Incidental findings:   Atherosclerotic calcifications in the carotid s
iphons .      Bilateral cataract surgery changes      Impression:      No acute 
abnormalities.      Chronic findings:   1.  Mild generalized volume loss.   2.  
Mild supratentorial white matter small vessel ischemic changes.      Signed by: 
DR Shamar Silva M.D. on 2019 2:12 PM        Dictated By: SHAMAR SINGH MD  Electronically Signed By: SHAMAR LORENZANA MD on 19 
 Transcribed By: BRINDA on 19       COPY TO:   OSVALDO CAMPOS MD    
     PBSQZCQGBW1977-28-53 15:56:00* 



             Test Item    Value        Reference Range Interpretation Comments

 

             POC INR (test code = POC INR) 2.8 1        0.9-1.2                 

   





[facility] HospitalDgsczzfBQZKWLCEYB1880-17-29 15:56:00* 



             Test Item    Value        Reference Range Interpretation Comments

 

             POC PT (test code = POC PT) 31.8 s       12.0-14.7                 

 





[facility] CHEST XA3294-74-25 17:31:00                                  
                                                    [place] [address]      Patient Name: JOCELYNE COLVIN                                
   MR #: [id number]                     : 1932                         
          Age/Sex: 87/F  Acct #: [id number]                              Req 
#: [id number]  Adm Physician:                                                   
   Ordered by: JOJO DACOSTA MD                            Report #: [id number]
[id number]        Location: CT                                      Room/Bed:         
            ___________________________________________
________________________________________________________    Procedure: [id number]
 CT/CT CHEST WO  Exam Date: 19                            Exam Time: 1619 
                                             REPORT STATUS: Signed    CT of the 
chest, without contrast, 2019.          History: Chronic bronchitis.      C
omparison: 2019, 2019.      Technique: Multidetector CT scanning of the 
est was performed from the level   of the thoracic inlet to the upper abdomen wi
thout IV or oral contrast.       Dose reduction: The examination was performed a
ccording to departmental   dose-optimization program which includes automated ex
posure control, adjustment   of the mA and/or kV according to patient size and/o
r use of iterative   reconstruction technique.                                  
Findings:   The visualized portions of the thyroid gland are unremarkable.      
There is no axillary, mediastinal, or hilar adenopathy      The heart is stably 
enlarged. There is unchanged fluid density structure in the   lower left peritra
cheal region which may represent fluid within the pericardial   recess. The asce
nding thoracic aorta is ectatic measuring 3.8 cm in diameter.      The main pulm
onary artery is stably prominent measuring 3.3 cm in diameter and   may be sugge
stive of underlying pulmonary hypertension.   The trachea and central airways ar
e clear.      Unchanged tree-in-bud opacities are present in the right upper lob
e and are   best appreciated on series 3 image 55 and in the right middle lobe w
hich are   best appreciated on series 3 image 64.      No new consolidation or p
ulmonary nodules are identified.      There is no pleural effusion. There is no 
pneumothorax.      Limited evaluation of the upper abdomen is no focal hepatic l
esions in the   visualized portions of the liver. The patient is status post cho
lecystectomy.      No acute osseous abnormalities are identified.      IMPRESSIO
N:   1. Unchanged tree-in-bud opacities in the right upper and middle lobe. No n
ew   consolidation or pulmonary nodules.   2. Ectasia of the ascending thoracic 
aorta, unchanged.   3.Mild prominence of the main pulmonary artery. Correlate wi
th underlying   pulmonary hypertension.      Signed by: Stephen E Dreyer, MD on 
2019 5:40 PM        Dictated By: STEPHEN E DREYER MD  Electronically Signed
 By: STEPHEN E DREYER MD on 19 3954  Transcribed By: BRINDA on 19 1
740       COPY TO:   JOJO DACOSTA MD         TRMFDABSIN5108-34-76 16:30:00* 



             Test Item    Value        Reference Range Interpretation Comments

 

             POC INR (test code = POC INR) 1.9 1        0.9-1.2                 

   





[facility]EdqcyssWREQPGXUTQ6003-50-88 16:30:00* 



             Test Item    Value        Reference Range Interpretation Comments

 

             POC PT (test code = POC PT) 22.5 s       12.0-14.7                 

 





[facility] HospitalIjcaydmNTDWTFPCUP6765-86-14 18:03:00* 



             Test Item    Value        Reference Range Interpretation Comments

 

             POC INR (test code = POC INR) 1.6 1        0.9-1.2                 

   





[facility]oxhwqvFXAYHTMOHD7672-19-06 18:03:00* 



             Test Item    Value        Reference Range Interpretation Comments

 

             POC PT (test code = POC PT) 18.5 s       12.0-14.7                 

 





[facility]] PROTHROMBIN W/INR + PARTIAL THROMBOPLASTIN [id number]
 12:46:00* 



             Test Item    Value        Reference Range Interpretation Comments

 

                                        PARTIAL THROMBOPLASTIN TIME, ACTIVATED (

test code = PARTIAL THROMBOPLASTIN TIME,

 ACTIVATED)     36 {sec}        22-34                           This test has no

t been validated for 

monitoringunfractionated heparin therapy. For testing thatis validated for this 
type of therapy, please referto the Heparin Anti-Xa assay (test code 98384). For
 additional information, please refer 
tohttp://education.[employer]/faq/JUD591(This link is being provided 
for informational/educational purposes only.)

 

             INR (test code = INR) 2.3                                    Refere

nce Range                     

0.9-1.1Moderate-intensity Warfarin Therapy 2.0-3.0Higher-intensity Warfarin 
Therapy   3.0-4.0

 

             PT (test code = PT) 23.5 {sec}   9.0-11.5                   





[facility]] CBC (INCLUDES DIFF/PLT)2019-11-15 16:31:01* 



             Test Item    Value        Reference Range Interpretation Comments

 

             WBC (test code = 6690-2) 8.1 {K/CMM}  3.7-10.4                   

 

             RBC; Below Low Threshold (test code = 789-8) 3.83 {M/CMM} 4.20-5.40

                  

 

             Hgb; Below Low Threshold (test code = 718-7) 11.7 g/dl    12.0-16.0

                  

 

             Hct; Below Low Threshold (test code = 94798-5) 34.5 %       36.0-48

.0                  

 

             MCV (test code = 787-2) 90.1 fL      80.0-98.0                  

 

             MCH (test code = 785-6) 30.5 pg      27.0-31.0                  

 

             MCHC (test code = 786-4) 33.9 g/dl    32.0-36.0                  

 

             RDW (test code = 788-0) 13.2 %       11.5-14.5                  

 

             Platelet (test code = 09310-8) 286 {K/CMM}  133-450                

    

 

             Mean Platelet Volume (test code = 76595-1) 10.1 fL      7.4-10.4   

                





[facility]] Differential2019-11-15 16:31:01* 



             Test Item    Value        Reference Range Interpretation Comments

 

             Segmented Neutrophils (test code = 95226-7) 59.5 %       45.0-75.0 

                 

 

             Monocytes (test code = 26485-3) 9.2 %        2.0-12.0              

     

 

             Lymphocytes (test code = 30002-5) 27.1 %       20.0-40.0           

       

 

             Eosinophils (test code = 86690-2) 3.6 %        0.0-4.0             

       

 

             Basophils (test code = 706-2) 0.6 %        0.0-1.0                 

   

 

             Segs-Bands # (test code = 29075-3) 4.8 {K/CMM}  1.5-8.1            

        

 

             Lymphocytes # (test code = 81094-5) 2.2 {K/CMM}  1.0-5.5           

         

 

             Monocytes # (test code = 18764-2) 0.7 {K/CMM}  0.0-0.8             

       

 

             Eosinophils # (test code = 66333-5) 0.3 {K/CMM}  0.0-0.5           

         





[facility]] CMP W/EGFR2019-11-15 16:31:01* 



             Test Item    Value        Reference Range Interpretation Comments

 

             Sodium Level; Above High Threshold (test code = 2951-2) 146 {mEq/l}

  135-145                    

 

             Potassium Level (test code = 2823-3) 4.6 {mEq/l}  3.5-5.1          

          

 

             Chloride Level (test code = 5-0) 109 {mEq/l}              

         

 

             Carbon Dioxide (test code = -9) 28 {mEq/l}   24-32             

         

 

             AGAP (test code = 33037-3) 13.6 {mEq/l} 10.0-20.0                  

 

             Glucose Lvl; Above High Threshold (test code = 2345-7) 135 mg/dl   

 70-99                     Adult 

reference range values reflect the clinical guidelinesof the American Diabetes 
Association.

 

             Creatinine Lvl (test code = 2160-0) 1.20 mg/dl   0.50-1.40         

         

 

             Blood Urea Nitrogen (test code = 3094-0) 22 mg/dl     7-22         

              

 

             BUN/Creatinine Ratio (test code = 3097-3) 18           6-25        

               

 

             Total Protein (test code = 2885-2) 7.1 g/dl     6.4-8.4            

        

 

             Albumin Lvl (test code = 1751-7) 3.7 g/dl     3.5-5.0              

      

 

             Globulin (test code = 78554-4) 3.4 g/dl     2.7-4.2                

    

 

             A/G Ratio (test code = 1759-0) 1.1          0.7-1.6                

    

 

                    Calcium Level Total; Below Low Threshold (test code = 97501-

6) 7.7 mg/dl           

8.5-10.5                                             

 

             ALT (test code = 1743-4) 25 u/l       0-65                       

 

             AST (test code = 21622-6) 30 u/l       0-37                       

 

             Alk Phos (test code = 1783-0) 75 u/l                         

  The pediatric reference ranges for

 this test represent a CLSI-basedtransference of the CALIPER database of 
pediatric reference intervals to theSiemens Vista analyzer (Clinical 
Biochemistry 46 (2013): 3707-7508). [facility] has 
not internally validated these referenceranges and therefore they should be used
 only in the context of a thoroughclinical assessment.

 

             Bili Total (test code = 1975-2) 0.3 mg/dl    0.2-1.3               

     

 

             eGFR (test code = 33375-0) 41 {ML/MIN/1.7}                         

  The eGFR is calculated using the 

CKD-EPI formula. In most young, healthyindividuals the eGFR will be >90 
mL/min/1.73m2. The eGFR declines with age. AneGFR of 60-89 may be normal in some
 populations, particularly the elderly, forwhom the CKD-EPI formula has not been
 extensively validated. Use of the eGFR isnot recommended in the following 
populations:Individuals with unstable creatinine concentrations, including 
pregnantpatients and those with serious co-morbid conditions.Patients with 
extremes in muscle mass or diet.The data above are obtained from the National 
Kidney Disease Education Program(NKDEP) which additionally recommends that when 
the eGFR is used in patientswith extremes of body mass index for purposes of 
drug dosing, the eGFR shouldbe multiplied by the estimated BMI.





[facility]] PROTHROMBIN W/INR + PARTIAL THROMBOPLASTIN 
[id number] 15:43:00* 



             Test Item    Value        Reference Range Interpretation Comments

 

                                        PARTIAL THROMBOPLASTIN TIME, ACTIVATED (

test code = PARTIAL THROMBOPLASTIN TIME,

 ACTIVATED)     39 {sec}        22-34                           This test has no

t been validated for 

monitoringunfractionated heparin therapy. For testing thatis validated for this 
type of therapy, please referto the Heparin Anti-Xa assay (test code 75858). For
 additional information, please refer 
tohttp://K[employer]agent.[employer]/faq/EDV254(This link is being provided 
for informational/educational purposes only.)

 

             INR (test code = INR) 3.0                                    Refere

nce Range                     

0.9-1.1Moderate-intensity Warfarin Therapy 2.0-3.0Higher-intensity Warfarin 
Therapy   3.0-4.0

 

             PT (test code = PT) 30.1 {sec}   9.0-11.5                   





[facility]] PROTHROMBIN W/INR + PARTIAL THROMBOPLASTIN 
TIMES2019-10-09 16:09:00* 



             Test Item    Value        Reference Range Interpretation Comments

 

                                        PARTIAL THROMBOPLASTIN TIME, ACTIVATED (

test code = PARTIAL THROMBOPLASTIN TIME,

 ACTIVATED)     35 {sec}        22-34                           This test has no

t been validated for 

monitoringunfractionated heparin therapy. For testing thatis validated for this 
type of therapy, please referto the Heparin Anti-Xa assay (test code 44730). For
 additional information, please refer 
tohttp://K[employer]agent.[employer]/faq/PHP446(This link is being provided 
for informational/educational purposes only.)

 

             INR (test code = INR) 2.2                                    Refere

nce Range                     

0.9-1.1Moderate-intensity Warfarin Therapy 2.0-3.0Higher-intensity Warfarin 
Therapy   3.0-4.0

 

             PT (test code = PT) 22.4 {sec}   9.0-11.5                   





[facility]] PROTHROMBIN TIME-[id number] 13:30:00* 



             Test Item    Value        Reference Range Interpretation Comments

 

             INR (test code = INR) 2.4                                    Refere

nce Range                     

0.9-1.1Moderate-intensity Warfarin Therapy 2.0-3.0Higher-intensity Warfarin 
Therapy   3.0-4.0

 

             PT (test code = PT) 23.7 {sec}   9.0-11.5                  For more

 information on this test, go 

to:http://education.[employer]/faq/IDX784





[facility]] CMP W/[id number] 09:08:00* 



             Test Item    Value        Reference Range Interpretation Comments

 

             GLUCOSE; Normal (test code = 1547-9) 109 mg/dl           N   

         Non-fasting reference 

interval

 

             UREA NITROGEN (BUN) (test code = UREA NITROGEN (BUN)) 22 mg/dl     

7-25         N             

 

             CREATININE (test code = CREATININE) 1.04 mg/dl   0.60-0.88         

        For patients >49 

years of age, the reference limitfor Creatinine is approximately 13% higher for 
peopleidentified as -American.

 

                          eGFR NON- (test code = eGFR NON-SHAHAB

N AMERICAN) 49 

{ML/MIN/1.7}        > OR = 60                                

 

                    eGFR  (test code = eGFR ) 56

 {ML/MIN/1.7}     > OR =

 60                                                  

 

             BUN/CREATININE RATIO (test code = BUN/CREATININE RATIO) 21 {CALC}  

  6-22         N             

 

             SODIUM (test code = SODIUM) 142 mmol/L   135-146      N            

 

 

             POTASSIUM (test code = POTASSIUM) 4.5 mmol/L   3.5-5.3      N      

       

 

             CHLORIDE (test code = CHLORIDE) 107 mmol/L          N        

     

 

             CARBON DIOXIDE (test code = CARBON DIOXIDE) 27 mmol/L    20-32     

   N             

 

             CALCIUM (test code = CALCIUM) 9.5 mg/dl    8.6-10.4     N          

   

 

             PROTEIN, TOTAL (test code = PROTEIN, TOTAL) 6.9 g/dl     6.1-8.1   

   N             

 

             ALBUMIN (test code = ALBUMIN) 4.1 g/dl     3.6-5.1      N          

   

 

             GLOBULIN (test code = GLOBULIN) 2.8 {G/DL  CALC} 1.9-3.7      N    

         

 

                ALBUMIN/GLOBULIN RATIO (test code = ALBUMIN/GLOBULIN RATIO) 1.5 

{CALC}      1.0-2.5         N

                                         

 

             BILIRUBIN, TOTAL; Normal (test code = 27983-7) 0.4 mg/dl    0.2-1.2

      N             

 

             ALKALINE PHSPHATASE (test code = ALKALINE PHSPHATASE) 55 u/l       

       N             

 

             AST; Normal (test code = 1916-6) 18 u/l       10-35        N       

      

 

             ALT; Normal (test code = 1742-6) 13 u/l       6-29         N       

      





[facility]] PROTHROMBIN TIME-[id number] 15:30:00* 



             Test Item    Value        Reference Range Interpretation Comments

 

             INR (test code = INR) 2.3                                    Refere

nce Range                     

0.9-1.1Moderate-intensity Warfarin Therapy 2.0-3.0Higher-intensity Warfarin 
Therapy   3.0-4.0

 

             PT (test code = PT) 23.0 {sec}   9.0-11.5                  For more

 information on this test, go 

to:http://Kontagent.[employer]/faq/ZYJ705





[facility]] PROTHROMBIN TIME-[id number] 16:02:00* 



             Test Item    Value        Reference Range Interpretation Comments

 

             INR (test code = INR) 2.2                                    Refere

nce Range                     

0.9-1.1Moderate-intensity Warfarin Therapy 2.0-3.0Higher-intensity Warfarin 
Therapy   3.0-4.0

 

             PT (test code = PT) 22.2 {sec}   9.0-11.5                  For more

 information on this test, go 

to:http://[employer]/faq/QIX252





[facility] [id number] 07:25:00                    
                                                                  [place] [address]      Patient Name: JOCELYNE COLVIN        
                           MR #: [id number]                     : 1932 
                                  Age/Sex: 86/F  Acct #: [id number]           
                   Req #: [id number]  Adm Physician:                            
                          Ordered by: JOJO TAYLOR MD                       
     Report #: [id number]        Location: CT                                    
  Room/Bed:                     ___________________________________________
________________________________________________________    Procedure: [id number]
 CT/CT CHEST WO  Exam Date: 19                            Exam Time: 1643 
                                             REPORT STATUS: Signed    EXAM: CT C
hest WITHOUT contrast 2019 4:21 PM   INDICATION:          [id number]    1643  
  CHRONIC BRONCHITIS   COMPARISON: CT chest 2019      TECHNIQUE:   Chest w
as scanned utilizing a multidetector helical scanner from the lung apex   throug
h the level of the adrenal glands without administration of IV contrast.   Absen
ce of intravenous contrast decreases sensitivity for detection of   lymphadenopa
thy and vascular pathology. Coronal and sagittal reformations were   obtained.  
Routine protocol was performed.              IV CONTRAST: None      COMPLICATION
S: None      RADIATION DOSE:        Total DLP: 418.3 mGy*cm        Estimated eff
ective dose: (DLP x 0.015 x size factor) mSv        CTDIvol has been reviewed. I
t is below the limits set by the Radiation   Protocol Committee (RPC).      FIND
INGS:      LINES/ TUBES: None.      LUNGS AND AIRWAYS:  Persistent few bilateral
 small multifocal areas of   tree-in-bud and tubular pulmonary nodules, worse in
 the right middle lobe, and   mild central peribronchial wall thickening without
 bronchiectasis. No new   consolidations or suspicious pulmonary nodules.       
    PLEURA: The pleural spaces are clear.      HEART AND MEDIASTINUM: The thyroi
d gland is normal.  Round fluid attenuation   structure in the left lower paratr
acheal region on series 2, image 47 is   unchanged and may reflect fluid in the 
pericardial recess. No mediastinal,   hilar or axillary lymphadenopathy.  Left a
trium enlargement. There is no   pericardial effusion.  Mild coronary artery lisandra
cifications.  Borderline size of   the ascending thoracic aorta (3.8 cm). Mild s
cattered atherosclerotic   calcifications of the thoracic aorta. The main pulmon
kosta artery is mildly   enlarged measuring 3.3 cm and suggestive of mild pulmonar
y hypertension.      UPPER ABDOMEN: Cholecystectomy. Suboptimally evaluated few 
small subcortical   left renal cysts.      BONES: Stable T12 compression deformi
ty and  remote posttraumatic deformity of   a few left-sided ribs.      SOFT TIS
SUES: Unremarkable.      IMPRESSION:    Stable bilateral few multifocal areas of
 tree-in-bud and tubular pulmonary   nodules, worse in the right middle lobe, kumar
ggestive of bronchopulmonary   aspergillosis (ABPA) or atypical mycobacterial in
fection. Consider correlation   with bronchoscopy.      No new consolidations or
 lymphadenopathy in the chest.      Signed by: Dr. Coreen Loza M.D.
 on 2019 7:34 AM        Dictated By: COREEN LOZA MD  Electronica
lly Signed By: COREEN LOZA MD on 19  Transcribed By: GEREMIAS LOWE on 19       COPY TO:   JOJO TAYLOR MD, [facility]         CT CHEST 
[id number] 12:26:00                                                           
                           [place] [address]      Patient Name:
 JOCELYNE COLVIN                                   MR #: [id number]           
          : 1932                                   Age/Sex: 86/F  Acct 
#: [id number]                              Req #: [id number]  Adm Physician:  
                                                    Ordered by: RADHA CAMPOS MD                            Report #: [id number]        Location: CT           
                           Room/Bed:                     
____________________________________________
_______________________________________________________    Procedure: [id number] 
CT/CT CHEST WO  Exam Date: 19                            Exam Time: 1214  
                                            REPORT STATUS: Signed    EXAMINATION
: CT scan of the chest  without contrast.      TECHNIQUE: Spiral CT images of th
e chest were performed from the lung apices to   the level of the adrenal glands
.  No intravenous contrast was administered per   referring physician request. C
oronal and sagittal reformatted images were   obtained.      COMPARISON: CT abdo
men and pelvis 2019      CLINICAL HISTORY:Right middle lobe opacity identif
ied on CT abdomen and pelvis      DISCUSSION:   ABSENCE OF INTRAVENOUS CONTRAST 
DECREASES SENSITIVITY FOR   DETECTION OF FOCAL LESIONS AND VASCULAR PATHOLOGY.  
    LINES/TUBES:  None.      LUNGS AND AIRWAYS: Cluster of small nodules in a Y 
shaped configuration in the   right middle lobe corresponding to the abnormality
 identified on the comparison   CT abdomen and pelvis. Superolateral to this, al
so within the right lower lobe   is an additional cluster of nodular opacities i
n a tree-in-bud configuration. A   cluster of nodules in a similar configuration
 laterally within the left upper   lobe just lateral to the minor fissure seen o
n series 3 image 39. A similar   tubular/nodular opacity anteriorly within the l
eft lower lobe seen on series 3   image 65. Trace groundglass opacity in the dep
endent portions of the lower   lobes compatible with subsegmental atelectasis. T
he trachea, mainstem bronchi,   and central lobar bronchi are patent.      PLEUR
A: No pneumothorax or pleural effusions.        HEART AND MEDIASTINUM:  Visualiz
ed portions of the thyroid gland are normal.   There is borderline ectasia of th
e ascending thoracic aorta (3.8 cm). Great   vessel origins are normal in config
uration, with marked tortuosity of the   central great vessels. Pulmonary outflo
w tract is of normal caliber. No   pericardial effusion. H         LYMPH NODES: 
No axillary, hilar, or mediastinal lymphadenopathy.      ABDOMEN: Visualized por
tions of the liver, spleen, pancreas, and adrenals are   unremarkable.      BONE
S AND SOFT TISSUES: No osseous destructive lesions. Osteopenia with   multilevel
 degenerative disc disease of the thoracic spine. Anterior   compression deformi
ty of T12 is again noted. Healed fracture deformities of   multiple left-sided r
ibs.      IMPRESSION:       Clustered nodules in tree-in-bud/Y shaped and tubula
r configurations involving   the right middle lobe, right upper lobe, and left l
ower lobe. Differential   considerations include allergic bronchopulmonary asper
gillosis (ABPA) as well   as atypical mycobacterial infection. Bronchial atresia
 or endobronchial   neoplasm is felt to be unlikely given the multifocality. Pul
monary consultation   for possible bronchoscopy is suggested.      Atherosclerot
ic vascular disease with borderline ectasia of the ascending   thoracic aorta (3
.8 cm).      Signed by: Dr. Gamal Hatch M.D. on 2019 12:34 PM        Dict
ated By: GAMAL HATCH MD  Electronically Signed By: GAMAL HATCH MD on 
9 1234  Transcribed By: BRINDA on 19 1234       COPY TO:   RADHA CAMPOS MD        CT ABDOMEN/PELVIS -62-75 09:51:00                               
                                                       [place] [address]      Patient Name: JOCELYNE COLVIN                      
             MR #: [id number]                     : 1932               
                    Age/Sex: 86/F  Acct #: [id number]                         
     Req #: [id number]  Adm Physician:                                          
            Ordered by: RADHA CAMPOS MD                            Report #: 
[id number]        Location: CT                                      Room/Bed:    
                 ____________________________________________
_______________________________________________________    Procedure: [id number] 
CT/CT ABDOMEN/PELVIS W  Exam Date: 19                            Exam Time
: 0936                                              REPORT STATUS: Signed    EXA
MINATION: CT of the abdomen and pelvis with contrast.      TECHNIQUE:    Spiral 
CT images of the abdomen and pelvis were performed from the lung bases   to the 
lesser trochanters after the intravenous administration of 100 cc of   Isovue-37
0 and the oral administration of water.  Coronal and sagittal   reformatted imag
es were obtained.      COMPARISON:  None.      CLINICAL HISTORY:Abdominal pain. 
Patient reports history of appendectomy,   cholecystectomy, hysterectomy, and pr
ior intestinal surgery.           DISCUSSION:      ABDOMEN/PELVIS:      LOWER TH
ORAX:Patchy opacity in the right middle lobe and lower lobe, partially   visuali
zed, likely representing atelectasis or scar.      HEPATOBILIARY: No focal hepat
ic lesions.  No intra-or extrahepatic biliary   ductal dilation.  The gallbladde
r has been removed.       SPLEEN: No splenomegaly.      PANCREAS: No focal piter
s or ductal dilatation.       ADRENALS: No adrenal nodules.      KIDNEYS/URETERS
: Bilateral extrarenal pelves. No hydronephrosis or calculi. 4   cm exophytic hy
poattenuating lesion projecting from the interpolar left kidney,   average inter
nal attenuation 5-10 Hounsfield units compatible with a simple   cyst. Additiona
l 1.6 cm simple cyst projecting from the lower pole of the left   kidney. Additi
onal subcentimeter hypoattenuating lesions bilaterally, too small   to further c
haracterize though likely to represent additional small cysts.      PELVIC ORGAN
S/BLADDER: The urinary bladder is collapsed and poorly evaluated.   The uterus i
s not identified and has presumably been removed. Peripherally   calcified nodul
ar structure in the rectovesical space may represent a calcified   lymph node.  
    PERITONEUM/RETROPERITONEUM: No ascites. No pneumoperitoneum.      LYMPH NODE
S: No pelvic sidewall, retroperitoneal, or mesenteric   lymphadenopathy.      VE
SSELS: Atherosclerotic calcification of the abdominal aorta, major branch   vess
els, and iliac arterial systems, with focal aneurysmal dilatation of the   infra
renal abdominal aorta (3.2 cm). Moderate SMA origin stenosis seen on   series 2 
image 19. Celiac and NEELAM origins are patent. Portal vein, splenic   vein, and ce
ntral superior mesenteric vein are patent.      GI TRACT: The large bowel is not
able for multiple diverticula scattered along   the course of the sigmoid colon,
 without wall thickening or adjacent   inflammatory change. The appendix is not 
identified compatible with prior   appendectomy. There is no small bowel dilatat
ion to suggest obstruction.      BONES AND SOFT TISSUE: No osseous destructive l
esions. Degenerative disc   changes and facet arthropathy of the lumbar spine. S
tatus post vertebral plasty   at L3. Age indeterminate moderate compression defo
rmity of T12 with   approximately 50% loss of height and small amount of fragmen
t retropulsion into   the spinal canal.  Postsurgical scar of the anterior abdom
inal wall. No   additional focal soft tissue abnormalities.      IMPRESSION:    
   Age-indeterminate moderate compression deformity of T12 with superior endplat
e   retropulsion into the spinal canal, without significant spinal canal stenosi
s.   Correlate for point tenderness.      No acute intra-abdominal or pelvic CT 
abnormalities.      Large bowel diverticulosis without evidence of diverticuliti
s.      Atherosclerotic vascular disease with focal mild aneurysmal dilatation o
f the   infrarenal abdominal aorta (3.2 cm). Moderate SMA origin stenosis with w
idely   patent celiac and NEELAM origins.      Partially visualized patchy opacity 
in the right middle lobe may reflect   atelectasis or inflammatory process. CT s
can of the chest without contrast   should be considered for further evaluation.
      Signed by: Dr. Gamal Hatch M.D. on 2019 10:03 AM        Dictated B
y: GAMAL HATCH MD  Electronically Signed By: GAMAL HATCH MD on 19 1003
  Transcribed By: BRINDA on 19 1003       COPY TO:   RADHA CAMPOS MD   
     [[facility]] PROTHROMBIN TIME-[id number] 11:39:00* 



             Test Item    Value        Reference Range Interpretation Comments

 

             INR (test code = INR) 2.1                                    Refere

nce Range                     

0.9-1.1Moderate-intensity Warfarin Therapy 2.0-3.0Higher-intensity Warfarin 
Therapy   3.0-4.0

 

             PT (test code = PT) 20.7 {sec}   9.0-11.5                  For more

 information on this test, go 

to:http://K[employer]/faq/WRY316





[facility]] PROTHROMBIN TIME-[id number] 13:59:00* 



             Test Item    Value        Reference Range Interpretation Comments

 

             INR (test code = INR) 2.2                                    Refere

nce Range                     

0.9-1.1Moderate-intensity Warfarin Therapy 2.0-3.0Higher-intensity Warfarin 
Therapy   3.0-4.0

 

             PT (test code = PT) 22.0 {sec}   9.0-11.5                  For more

 information on this test, go 

to:http://K[employer]/faq/IDE172





[facility]] PROTHROMBIN TIME-[id number] 15:02:00* 



             Test Item    Value        Reference Range Interpretation Comments

 

             INR (test code = INR) 1.8                                    Refere

nce Range                     

0.9-1.1Moderate-intensity Warfarin Therapy 2.0-3.0Higher-intensity Warfarin 
Therapy   3.0-4.0

 

             PT (test code = PT) 17.7 {sec}   9.0-11.5                  For more

 information on this test, go 

to:http://[employer]/faq/LNM700





[facility]] PROTHROMBIN TIME-[id number] 15:55:00* 



             Test Item    Value        Reference Range Interpretation Comments

 

             INR (test code = INR) 1.7                                    Refere

nce Range                     

0.9-1.1Moderate-intensity Warfarin Therapy 2.0-3.0Higher-intensity Warfarin 
Therapy   3.0-4.0

 

             PT (test code = PT) 17.2 {sec}   9.0-11.5                  For more

 information on this test, go 

to:http://K[employer]/faq/GGP997





[facility]] PROTHROMBIN TIME-[id number] 09:56:00* 



             Test Item    Value        Reference Range Interpretation Comments

 

             INR (test code = INR) 1.4                                    Refere

nce Range                     

0.9-1.1Moderate-intensity Warfarin Therapy 2.0-3.0Higher-intensity Warfarin 
Therapy   3.0-4.0

 

             PT (test code = PT) 14.5 {sec}   9.0-11.5                  For more

 information on this test, go 

to:http://[employer]/faq/PSN714





[facility]] PROTHROMBIN TIME-INR2018-10-08 10:28:00* 



             Test Item    Value        Reference Range Interpretation Comments

 

             INR (test code = INR) 2.0                                    Refere

nce Range                     

0.9-1.1Moderate-intensity Warfarin Therapy 2.0-3.0Higher-intensity Warfarin 
Therapy   3.0-4.0

 

             PT (test code = PT) 20.0 {sec}   9.0-11.5                  For more

 information on this test, go 

to:http://[employer]/faq/DZX764





[facility]] PROTHROMBIN TIME-[id number] 07:09:00* 



             Test Item    Value        Reference Range Interpretation Comments

 

             INR (test code = INR) 1.9                                    Refere

nce Range                     

0.9-1.1Moderate-intensity Warfarin Therapy 2.0-3.0Higher-intensity Warfarin 
Therapy   3.0-4.0

 

             PT (test code = PT) 19.5 {sec}   9.0-11.5                  For more

 information on this test, go 

to:http://K[employer]/faq/LZC961





[facility]] PROTHROMBIN TIME-[id number] 10:34:00* 



             Test Item    Value        Reference Range Interpretation Comments

 

             INR (test code = INR) 2.5                                    Refere

nce Range                     

0.9-1.1Moderate-intensity Warfarin Therapy 2.0-3.0Higher-intensity Warfarin 
Therapy   3.0-4.0

 

             PT (test code = PT) 24.4 {sec}   9.0-11.5                  For more

 information on this test, go 

to:http://[employer]/faq/JAU421





[facility]] PROTHROMBIN TIME-[id number] 13:21:00* 



             Test Item    Value        Reference Range Interpretation Comments

 

             INR (test code = INR) 2.2                                    Refere

nce Range                     

0.9-1.1Moderate-intensity Warfarin Therapy 2.0-3.0Higher-intensity Warfarin 
Therapy   3.0-4.0

 

             PT (test code = PT) 22.2 {sec}   9.0-11.5                  For more

 information on this test, go 

to:http://[employer]/faq/TJQ504





[facility]] PROTHROMBIN TIME-[id number] 11:28:00* 



             Test Item    Value        Reference Range Interpretation Comments

 

             INR (test code = INR) 2.1                                    Refere

nce Range                     

0.9-1.1Moderate-intensity Warfarin Therapy 2.0-3.0Higher-intensity Warfarin 
Therapy   3.0-4.0

 

             PT (test code = PT) 20.7 {sec}   9.0-11.5                  For more

 information on this test, go 

to:http://[employer]/faq/CDD820





[facility] AKGTA0928-44-00 21:01:004[facility] 
[facility]URINE AND VVJNV1727-47-31 21:01:002[facility] STOOL
2018 21:01:00Negative (18 4:01 PM)[facility]
2018 21:01:00* 



             Test Item    Value        Reference Range Interpretation Comments

 

             UA pH (test code = UA pH) 7.0 1        5.0-8.0                    





[facility] UANCI3800-01-50 21:01:00Negative *NA*(18 4:01 PM)
[facility] CPCLB5457-75-70 21:01:00Clear (18 4:01 PM)[facility]
 [facility] AND XCXER0766-38-83 21:01:00* 



             Test Item    Value        Reference Range Interpretation Comments

 

             UA Spec Grav (test code = UA Spec Grav) 1.010 1                    

             





[facility] HermannCARDIAC EQGYFKY9395-38-16 20:04:00<0.02[facility]annCARDIAC 
ROZSGVJ3041-19-18 20:04:39538Yxxupghz HermannCHEM DEPGF6367-25-44 20:04:00* 



             Test Item    Value        Reference Range Interpretation Comments

 

             A/G Ratio (test code = A/G Ratio) 1.1 1        0.7-1.6             

       





[facility] HermannCHEM NREJB9595-15-35 20:04:003.8[facility] HermannCHEM PANEL
2018 20:04:0010.2[facility] HermannCHEM BLZYX3825-98-91 20:04:00* 



             Test Item    Value        Reference Range Interpretation Comments

 

             B/C Ratio (test code = B/C Ratio) 24 1         6-25                

       





[facility] HermannCHEM HSMMV0933-44-83 20:04:0055[facility] HermannCHEM PANEL
2018 20:04:0044[facility] HermannCHEM CDAGW9569-38-13 20:04:0045[facility] 
HermannCHEM WIUGG1286-55-70 20:04:0036[facility] HermannCHEM SWSZV5765-53-10 
20:04:004.0[facility] HermannCHEM JWHFR4379-64-92 20:04:007.8[facility] HermannCHEM 
ONPIC9236-16-11 20:04:009.3[facility] HermannCHEM UXYYL1284-72-59 20:04:0031
[facility] HermannCHEM MPZEE4718-86-12 20:04:24187Wlauddhp HermannCHEM PANEL
2018 20:04:004.2[facility] HermannCHEM XOBGZ2343-82-96 20:04:65358Qwhplynd 
HermannCHEM VQRCM0980-58-49 20:04:000.95[facility] HermannCHEM BWJMD2802-40-40 
20:04:0023[facility] HermannCHEM QKCIY7032-48-69 20:04:000.5[facility] HermannCHEM 
VMGBE1410-98-19 20:04:51629Gipkoasl RcjevnmSQMFKGOAPY6689-28-19 20:04:0010.3
[facility] QybfuyjUTGPPZBPPG7812-96-31 20:04:21824Kenhmlvl HermannHEMATOLOGY
2018 20:04:0013.9[facility] LupckmaDWEIBAWWPB8527-90-57 20:04:00* 



             Test Item    Value        Reference Range Interpretation Comments

 

             MCH (test code = MCH) 30.3 pg      27.0-31.0                  





[facility] SgprcnsVIVCTWPDQN1546-69-45 20:04:0089.0[facility] HermannHEMATOLOGY
2018 20:04:0036.7[facility] UjfblunWOABKWQJWR9363-74-57 20:04:0012.5[facility]
 FfutyjyQSDGAJXYII8153-09-56 20:04:0034.1[facility] OalvqgiAZHCXWGNMU2023-43-07 
20:04:0012.0[facility] DizcpjmASJNPPCUXR5323-23-35 20:04:004.12[facility]
IJDKLWBQCP8806-46-64 20:04:000.1[facility] JxsrmgsPBGJFXZYWB8850-29-31 20:04:000.5
[facility] JpkfvzmFMCASXOCCZ9922-88-03 20:04:001.5[facility] HermannHEMATOLOGY
2018 20:04:009.9[facility] SlwcxrvOOELBTJGHL6765-33-84 20:04:000.4[facility] 
SvoieitKNYALNCRTS1932 20:04:000.3[facility] VzrebitLDWQJFGLID7852-08-67 
20:04:0012.4[facility] GvrgtnxOGSIASNCBZ1969-23-68 20:04:004.1[facility]
WHVOHIDCFT0456-83-15 20:04:0082.8[facility]] PROTHROMBIN TIME-INR
2018 09:14:00* 



             Test Item    Value        Reference Range Interpretation Comments

 

             INR (test code = INR) 2.6                                    Refere

nce Range                     

0.9-1.1Moderate-intensity Warfarin Therapy 2.0-3.0Higher-intensity Warfarin 
Therapy   3.0-4.0

 

             PT (test code = PT) 25.9 {sec}   9.0-11.5                  For more

 information on this test, go 

to:http://[employer]/faq/OVK449





[facility]] PROTHROMBIN TIME-[id number] 10:12:00* 



             Test Item    Value        Reference Range Interpretation Comments

 

             INR (test code = INR) 2.1                                    Refere

nce Range                     

0.9-1.1Moderate-intensity Warfarin Therapy 2.0-3.0Higher-intensity Warfarin 
Therapy   3.0-4.0

 

             PT (test code = PT) 21.4 {sec}   9.0-11.5                  For more

 information on this test, go 

to:http://[employer]/faq/OGW608





[facility]] PROTHROMBIN TIME-[id number] 10:17:00* 



             Test Item    Value        Reference Range Interpretation Comments

 

             INR (test code = INR) 2.5                                    Refere

nce Range                     

0.9-1.1Moderate-intensity Warfarin Therapy 2.0-3.0Higher-intensity Warfarin 
Therapy   3.0-4.0

 

             PT (test code = PT) 25.1 {sec}   9.0-11.5                  For more

 information on this test, go 

to:http://[employer]/faq/MCD623





[facility]] PROTHROMBIN TIME-[id number] 11:03:00* 



             Test Item    Value        Reference Range Interpretation Comments

 

             INR (test code = INR) 3.0                                    Refere

nce Range                     

0.9-1.1Moderate-intensity Warfarin Therapy 2.0-3.0Higher-intensity Warfarin 
Therapy   3.0-4.0

 

             PT (test code = PT) 30.0 {sec}   9.0-11.5                  For more

 information on this test, go 

to:http://K[employer]/faq/SPB051





[facility]] PROTHROMBIN TIME-[id number] 07:04:00* 



             Test Item    Value        Reference Range Interpretation Comments

 

             INR (test code = INR) 1.6                                    Refere

nce Range                     

0.9-1.1Moderate-intensity Warfarin Therapy 2.0-3.0Higher-intensity Warfarin 
Therapy   3.0-4.0

 

             PT (test code = PT) 17.0 {sec}   9.0-11.5                  For more

 information on this test, go 

to:http://[employer]/faq/KMU294





[facility]] PROTHROMBIN TIME-[id number] 07:06:00* 



             Test Item    Value        Reference Range Interpretation Comments

 

             INR (test code = INR) 2.3                                    Refere

nce Range                     

0.9-1.1Moderate-intensity Warfarin Therapy 2.0-3.0Higher-intensity Warfarin 
Therapy   3.0-4.0

 

             PT (test code = PT) 23.8 {sec}   9.0-11.5                  For more

 information on this test, go 

to:http://[employer]/faq/GXC821





[facility]] LIPID [id number] 07:04:00* 



             Test Item    Value        Reference Range Interpretation Comments

 

             CHOLESTEROL, TOTAL; Normal (test code = 2093-3) 172 mg/dl    <200  

       N             

 

             HDL CHOLESTEROL; Below Low Threshold (test code = 2085-9) 48 mg/dl 

    >50                        

 

             TRIGLYCERIDES; Above High Threshold (test code = 2571-8) 172 mg/dl 

   <150                       

 

             LDL-CHOLESTEROL; Normal (test code = 50411-8) 97 {MG/DL  LISANDRA}      

        N            Reference 

range: <100 Desirable range <100 mg/dL for patients with CHD ordiabetes and <70 
mg/dL for diabetic patients withknown heart disease. LDL-C is now calculated 
using the Isidro-Tan calculation, which is a validated novel method 
providing better accuracy than the Friedewald equation in the estimation of LDL-
C. Isidro HYATT et al. KELSIE. 2013;310(10): 8005-0894 (http://K[employer]/faq/BUX148)

 

             CHOL/HDLC RATIO (test code = CHOL/HDLC RATIO) 3.6 {CALC}   <5.0    

     N             

 

             NON HDL CHOLESTEROL (test code = NON HDL CHOLESTEROL) 124 {MG/DL  C

AL} <130         N            

For patients with diabetes plus 1 major ASCVD risk factor, treating to a 
non-HDL-C goal of <100 mg/dL (LDL-C of <70 mg/dL) is considered a therapeutic 
option.





[facility]] TSH, 3RD [id number] 07:04:00* 



             Test Item    Value        Reference Range Interpretation Comments

 

             TSH; Normal (test code = 62219-8) 3.01 {MIU/L} 0.40-4.50    N      

       





[facility]] HEMOGLOBIN [id number] 07:04:00* 



             Test Item    Value        Reference Range Interpretation Comments

 

             HEMOGLOBIN A1c; Normal (test code = 4548-4) 5.3 {% of total} <5.7  

       N            For the 

purpose of screening for the presence ofdiabetes: <5.7%       Consistent with 
the absence of diabetes5.7-6.4%    Consistent with increased risk for diabetes  
          (prediabetes)> or =6.5%  Consistent with diabetes This assay result is
 consistent with a decreased riskof diabetes. Currently, no consensus exists 
regarding use ofhemoglobin A1c for diagnosis of diabetes in children. According 
to American Diabetes Association (ADA)guidelines, hemoglobin A1c <7.0% 
represents optimalcontrol in non-pregnant diabetic patients. Differentmetrics 
may apply to specific patient populations. Standards of Medical Care in 
Diabetes(ADA).





[facility]] PROTHROMBIN TIME-[id number] 11:38:00* 



             Test Item    Value        Reference Range Interpretation Comments

 

             INR (test code = INR) 3.4                                    Refere

nce Range                     

0.9-1.1Moderate-intensity Warfarin Therapy 2.0-3.0Higher-intensity Warfarin 
Therapy   3.0-4.0

 

             PT (test code = PT) 34.1 {sec}   9.0-11.5                  For more

 information on this test, go 

to:http://[employer]/faq/FPF707





[facility]] PROTHROMBIN TIME-[id number] 07:04:00* 



             Test Item    Value        Reference Range Interpretation Comments

 

             INR (test code = INR) 2.5                                    Refere

nce Range                     

0.9-1.1Moderate-intensity Warfarin Therapy 2.0-3.0Higher-intensity Warfarin 
Therapy   3.0-4.0

 

             PT (test code = PT) 25.2 {sec}   9.0-11.5                  For more

 information on this test, go 

to:http://[employer]/faq/BEP089





[facility]] PROTHROMBIN TIME-[id number] 10:55:00* 



             Test Item    Value        Reference Range Interpretation Comments

 

             INR (test code = INR) 3.9                                    Refere

nce Range                     

0.9-1.1Moderate-intensity Warfarin Therapy 2.0-3.0Higher-intensity Warfarin 
Therapy   3.0-4.0

 

             PT (test code = PT) 39.1 {sec}   9.0-11.5                  For more

 information on this test, go 

to:http://[employer]/faq/DDR448





[facility]] PROTHROMBIN TIME-INR2017-12-15 07:05:00* 



             Test Item    Value        Reference Range Interpretation Comments

 

             INR (test code = INR) 1.8                                    Refere

nce Range                     

0.9-1.1Moderate-intensity Warfarin Therapy 2.0-3.0Higher-intensity Warfarin 
Therapy   3.0-4.0

 

             PT (test code = PT) 18.8 {sec}   9.0-11.5                  For more

 information on this test, go 

to:http://[employer]/faq/LWP248





[facility]] PROTHROMBIN TIME-[id number] 10:18:00* 



             Test Item    Value        Reference Range Interpretation Comments

 

             INR (test code = INR) 5.3                                    Verifi

ed by repeat analysis. Reference Range      

               0.9-1.1Moderate-intensity Warfarin Therapy 
2.0-3.0Higher-intensity Warfarin Therapy   3.0-4.0

 

             PT (test code = PT) 52.2 {sec}   9.0-11.5                  Verified

 by repeat analysis.  For more

 information on this test, go 
to:http://[employer]/faq/OFP329





[facility] LusiumbcnyFROELTZOJAOJ1647-94-87 15:51:0013.0[facility] 
MszfwvyNCSJTHBJHYVD4497-43-52 15:51:0069[facility] BgeyjaaDXCLXVQWDMZF8134-31-26 
15:51:06679Weeqfbmh OysjbduBIMRSQHCFEBD5986-27-35 15:51:0025[facility]
FSYRLASZIPWQ2121-76-65 15:51:008.4[facility] DdtbjupSQJFQNWBCFLL9257-18-38 
15:51:004.0[facility] GyhnbbeULJQTGKLEBEZ5130-91-18 15:51:000.79[facility]
MPNZJDKDXGST0446-54-73 15:51:00201Ymivoxjf YcgupcwTLICGCHSENGY0755-31-21 
15:51:0020[facility] KlksgfxBDPPDHYDWHFT3848-17-93 15:51:86178Lrooigpz [place]
ROJZMXIEDO3932-75-23 15:51:000.1[facility] EdsmzlfSGDJOVBEWX6204-28-02 15:51:000.2
[facility] JqmvgafMJXQRYJDUC8291-18-53 15:51:001.6[facility] HermannHEMATOLOGY
2017 15:51:000.5[facility] DtfpungCIEVDATSIY1905-57-09 15:51:00Normal 
(17 9:51 AM)[facility] RjmtihgPCMARBXFHO2053-72-45 15:51:00Normal (17 
9:51 AM)[facility] InqtcziCQPUOQKKOT5344-52-86 15:51:0072.4[facility]
PMAYHFYVUT7259-98-37 15:51:000.6[facility] GougtzwSEMMHUDASM0855-92-88 15:51:00
19.0[facility] NhzccfzZBAMMGFIQP2378-38-05 15:51:006.2[facility]annHEMATOLOGY
2017 15:51:001.8[facility] ZqkvpnfMEEAHEAQAM6716-91-80 15:51:006.1[facility] 
HrlqkyoSBMLZHHCSQ1017-23-94 15:51:002.42[facility] PhuqlouJQSIVEBYSJ5950-49-77 
15:51:00* 



             Test Item    Value        Reference Range Interpretation Comments

 

             PT (test code = PT) 26.6 s       12.0-14.7                  





[facility] VshvoitJWJUUICUZT3739-94-41 15:51:00* 



             Test Item    Value        Reference Range Interpretation Comments

 

             PTT (test code = PTT) 35.0 s       22.9-35.8                  





[facility] GibhpauEMBDMVTTTH1309-08-29 15:51:009.8[facility]annHEMATOLOGY
2017 15:51:39814Kkekoyqy AgeiaacVOWDSMQSRM0414-66-63 15:51:0013.8[facility] 
BzrhjtnYSETWBRSKI9783-89-24 15:51:0034.8[facility] VthvenbTCPOIQRJYV2002-84-50 
15:51:00* 



             Test Item    Value        Reference Range Interpretation Comments

 

             MCH (test code = MCH) 31.1 pg      27.0-31.0                  





[facility] VqvmnvuHYERYEKMLI0044-60-48 15:51:0089.2[facility]annHEMATOLOGY
2017 15:51:0037.1[facility] HusksrfJPEXPERXSW4063-06-32 15:51:008.5[facility] 
CeakldiQNEDNEZSXJ1851-13-82 15:51:0012.9[facility] SmkakrbKVEIPALTPW4910-01-85 
15:51:004.15[facility] HermannCHEM ZSSSF7679-68-85 15:47:0059[facility] HermannCHEM 
SJBCD9810-20-13 15:47:000.9[facility] HermannCHEM UMCZP1463-37-77 17:55:002.1
[facility] JkhekguPZMYWXTKUPSA8196-25-65 14:07:299.5[facility] HermannELECTROLYTES
2016 14:07:2962[facility] GdrtirdJKPCZCRYWZWZ1772-91-14 14:07:12257Tjianvpd 
QhukwxsSFJLLKXFOVDD6314-25-61 14:07:2921[facility] HlnkrqyXQNJYNPCJFKG9509-07-69 
14:07:31290Ytduhrak JjfusjcMRALGAZHWYLP9755-78-17 14:07:290.87[facility]
WJIWWDFTPOZF1641-14-10 14:07:2927[facility] VlddumoGGVCMRBGOBFY2555-43-05 14:07:29
107[facility] QtvnsmaVHUQIGXOHPLW0547-74-04 14:07:293.5[facility]
FTGXYDYWKOGZ8987-20-20 14:07:298.4[facility] CrjxkggVHHFHRQNEV3783-50-21 14:07:29
  * 



             Test Item    Value        Reference Range Interpretation Comments

 

             PTT (test code = PTT) 31.9 s       22.9-35.8                  





[facility] HaezcdhXEGANRILKE6727-06-77 13:50:002.33[facility]annHEMATOLOGY
2016 13:50:00* 



             Test Item    Value        Reference Range Interpretation Comments

 

             PT (test code = PT) 25.9 s       12.0-14.7                  





[facility] OrqaljlDVQCURIPEN6773-58-85 13:50:0033.1[facility]annHEMATOLOGY
2016 13:50:0012.8[facility] RrzwblyQSBAPFWILH6943-27-97 13:50:57963Iwmplrrn 
XzwildjEUPVDAQALN2657-42-91 13:50:0010.8[facility] KnbyzazTMAMHGEBUK6419-31-49 
13:50:0014.1[facility] EsgtawjHOILDTOOLO6492-36-84 13:50:0010.9[facility] [place]
PAPPMGAYJP9902-59-36 13:50:0032.9[facility] IfwpzzaQKQRBRBHNE9174-90-77 13:50:00* 



             Test Item    Value        Reference Range Interpretation Comments

 

             MCH (test code = MCH) 29.5 pg      27.0-31.0                  





[facility] QqvimcpHKILGQWNGA5179-50-08 13:50:0089.0[facility]annHEMATOLOGY
2016 13:50:003.70[facility] KtcaluaNIQBLCOAQJ6970-51-16 13:50:004.7[facility] 
SysuqytPPAWMVKLHW4124-03-10 13:50:0087.1[facility] AbrdoegWUQVAFHZJH5087-22-97 
13:50:000.7[facility] UoxcwhlHDHLKRTVHZ1554-48-07 13:50:001.0[facility]
PRNANSINHD5469-21-06 13:50:0012.3[facility] BcshbcoXSMJSIEGPK0976-92-44 13:50:00
0.1[facility] BulhyqeCPLFETTUSF2991-53-67 13:50:000.6[facility] [facility]
2016 13:50:006.8[facility] KhympxpFDNRHIVMRI9303-74-03 13:50:000.8[facility] 
JklhcujUGNEMDIXBH0854-60-93 13:50:000.1[facility] JSBMQ2559-11-84
 13:29:00Negative *NA*(16 8:29 AM)[facility] AFQEQ2041-51-62
 13:29:00Moderate *ABN*(16 8:29 AM)[facility] STOOL
2016 13:29:001.012[facility] DUJUO0885-99-84 13:29:005.0
[facility] PYDTM5620-62-45 13:29:00Clear (16 8:29 AM)
[facility] OVTPV9774-48-26 13:29:007[facility] 
MRDLX4193-48-29 13:29:50828Vvobbuzg [facility]UR[facility] AND VASSF7278-67-79 13:29:00
Negative (16 8:29 AM)[facility] MLZUG0487-38-12 13:29:00
Moderate *ABN*(16 8:29 AM)[facility]ann[facility]STRY2011-11-15 11:32:0025.0
[facility] KrxcyjlZVKJZJESV1443-00-18 11:32:009.2[facility]annCHEMISTRY
2011-11-15 11:32:84722.0[facility] QyssxjiNFDOOOBEG0505-47-97 11:32:000.9[facility] 
ZubqrsiVNYXFZSJS2233-04-37 11:32:003.8[facility] UagkmikGXUMOTDGR6156-98-63 
11:32:79448.0[facility] ReqmbmxVWCMGAFND4246-76-17 11:32:0022.0[facility]
BFCQOXYBB3188-09-73 11:32:54562.0[facility] NetcrztJLOFZFUIS8944-06-88 11:32:00
14.8[facility]Y2011-11-15 11:32:000.0[facility]
2011-11-15 11:32:000.5[facility]2011-11-15 11:32:000.1[facility] 
[facility]ermannHEMATOLOGY2011-11-15 11:32:001.3[facility]ann[facility]MAT[facility]Y2011-11-15 
11:32:002.7[facility]2011-11-15 11:32:000.5[facility]
OHGFEUQTSK7293-99-69 11:32:002.1[facility] IdqaeaqLHUJZENQFJ6676-16-86 11:32:00
10.1[facility]ann[facility]MATOLOGY2011-11-15 11:32:0029.1[facility]
2011-11-15 11:32:0058.2[facility]Y2011-11-15 11:32:0010.9[facility]
 [facility]Y2011-11-15 11:32:58820.0[facility] OnoxlvwBNTGNNSKLR2656-49-10 
11:32:0012.7[facility]ann[facility]MATOLOGY2011-11-15 11:32:0036.3[facility]
SKOZQPCWNR3739-28-95 11:32:00* 



             Test Item    Value        Reference Range Interpretation Comments

 

             MCH (test code = MCH) 32.5 pg      27.0-31.0    H             





[facility] SzgjsqxZXFNTNEARQ0610-96-75 11:32:0089.4[facility]ann[facility]MATOLOGY
2011-11-15 11:32:0011.0[facility]Y2011-11-15 11:32:0030.3[facility]
 WxgyjjiSDLIIXPGLL3313-72-73 11:32:004.6[facility]Y2011-11-15 
11:32:003.39[facility]Y2011-11-15 11:32:00* 



             Test Item    Value        Reference Range Interpretation Comments

 

             INR (test code = INR) 1.04 1       0.85-1.17    N             





[facility] ZcerhhsPEHZFCVETE9898-74-55 11:32:00* 



             Test Item    Value        Reference Range Interpretation Comments

 

             PT (test code = PT) 13.6 s       12.0-14.7    N             





[facility] XznxfuyMANCZXNFTC9573-72-77 22:33:00* 



             Test Item    Value        Reference Range Interpretation Comments

 

             PT (test code = PT) 13.7 s       12.0-14.7    N             





[facility] AdzunxaPFRNIEQOQT7113-64-71 22:33:00* 



             Test Item    Value        Reference Range Interpretation Comments

 

             INR (test code = INR) 1.05 1       0.85-1.17    N             





[facility] DppwcmeKXKBHTHIXF3965-56-57 09:48:000.5[facility]annHEMATOLOGY
2011 09:48:009.3[facility] MokdnjvCLOWCXRWFX1772-09-60 09:48:002.6[facility] 
CilqlpwKILWMILUXX0231-18-01 09:48:002.1[facility] DywbdbbNWJNSPQXOG0895-27-44 
09:48:002.7[facility] NelslhnYTJAOXLPKD0552-82-61 09:48:0038.8[facility]
MOOGZTMSXC4258-01-11 09:48:0048.7[facility] OpgyhddZNDVQIKGHB2820-30-68 09:48:00
0.0[facility] PlziznbWITBMBLHLY9759-89-54 09:48:000.1[facility]annHEMATOLOGY
2011 09:48:000.5[facility] HqkoythFZODJLOUZD3266-58-10 09:48:0035.5[facility] 
KkldapgKBIEZRJSSS3896-81-11 09:48:80513.0[facility] EcifsimRJNXXJJURZ7997-82-78 
09:48:0011.1[facility] PivfjhjWAAUMZQNPN4048-15-13 09:48:0031.3[facility]
FUMQBMWLQY4152-58-80 09:48:00* 



             Test Item    Value        Reference Range Interpretation Comments

 

             MCH (test code = MCH) 32.3 pg      27.0-31.0    H             





[facility] JiismgwZVGODWXIYJ7298-24-21 09:48:0012.8[facility]annHEMATOLOGY
2011 09:48:0091.2[facility] NdmqxjgRWHPIWRXJH7876-20-40 09:48:005.4[facility] 
RuxiroyXFJPLOLLEC2094-87-76 09:48:0011.1[facility] MaavzgmLNYRNCKDHE1041-33-34 
09:48:003.43[facility] YfkwlpkPMHJTOGOX4506-37-20 12:48:0075.0[facility]
IQVIDCBXD7973-78-07 12:48:000.02[facility] IzowhaiAFFHKIFCY6873-62-66 12:48:002.4
[facility] UzxomnnVPLQCMXKG4298-86-73 12:48:004.0[facility]annCHEMISTRY
2011 12:48:003.8[facility] NrhjbvfPADRDOEVJ4583-33-55 12:48:0024.0[facility] 
PtlhyvrEAWSSDMHM0069-32-33 12:48:009.6[facility] XpaopocLASHYUGEN8682-21-07 
12:48:0038.0[facility] LmwaxwpZGGYTZLJZ2700-46-94 12:48:000.4[facility]
UUJYFJZSM3224-06-38 12:48:0015.0[facility] OezdrwxJSJHZPNJH7984-64-06 12:48:0023.0
[facility] IdhirxxGIWSOOMJZ1298-88-90 12:48:000.9[facility]annCHEMISTRY
2011 12:48:06667.0[facility] HynbprwYFFMMSUYG5901-67-19 12:48:004.3[facility] 
NykeejfZRSWHMWHS7858-25-10 12:48:67774.0[facility] LmhbrywKPPBJHASM6657-26-69 
12:48:0026.0[facility] PifsfzbGHDMILGYP6237-44-99 12:48:007.5[facility]
IMRUTBHPW2611-64-03 12:48:40081.0[facility] GromathVIFDPIFOD9268-10-11 12:48:00* 



             Test Item    Value        Reference Range Interpretation Comments

 

             B/C Ratio (test code = B/C Ratio) 26.0 1       6-25         H      

       





[facility] UsdmjpmWAWXADBHQ6560-82-85 12:48:0012.3[facility]annCHEMISTRY
2011 12:48:00* 



             Test Item    Value        Reference Range Interpretation Comments

 

             A/G Ratio (test code = A/G Ratio) 1.0 1        0.7-1.6      N      

       





[facility] SkgdcdkQITYELJWC4132-07-88 12:48:003.7[facility]annCHEMISTRY
2011 12:48:001.96[facility] ShcnjyoFJWFEJRXKA8977-63-36 12:48:0090.6[facility]
 MhxijtkXELXKRZUWA9007-35-24 12:48:0031.1[facility] AhrgmdqNWNGESDQMM1244-32-29 
12:48:003.44[facility] DvtdbrkRFORWEZOLK6529-76-71 12:48:0011.1[facility]
WSYOYJKDCN9831-88-38 12:48:0010.8[facility] ZtyoxfkSLJQXLBTOR3076-41-34 12:48:00
209.0[facility] VjakafmMVEEZEVBLE1490-72-62 12:48:00* 



             Test Item    Value        Reference Range Interpretation Comments

 

             MCH (test code = MCH) 32.2 pg      27.0-31.0    H             





[facility] SmssggpSRGIHCMQAO7382-86-29 12:48:0013.1[facility]annHEMATOLOGY
2011 12:48:0035.6[facility] ZtdnfmyTVGBOWKESG0404-14-19 12:48:005.1[facility] 
WoimwmrHZVWSZHTGD4563-65-26 12:48:000.1[facility] SedydroQUFYTTWGWE5263-46-98 
12:48:002.0[facility] KaqlggkNHAJEXZWVF1683-46-85 12:48:000.0[facility]
JZXSUDIBVA2235-24-87 12:48:001.8[facility] EuuejvkYYJJBZZXID6445-99-62 12:48:002.6
[facility] YbzprvsZWPUZUJOBJ4227-28-04 12:48:000.5[facility]annHEMATOLOGY
2011 12:48:000.4[facility] OfcvanfLQYDWFHWSE6544-51-26 12:48:0035.7[facility] 
DvftvdwSDCJSELNLT1781-15-47 12:48:0010.5[facility] NnmyebxRHKQHMKEOJ7522-74-75 
12:48:0051.4[facility] HermannBACTERIAL - VGMDAUUZ1268-04-02 09:30:00Negative 
1(2011 03:30:00) ??[facility] WlyaycdACBONAVMN7650-28-32 04:06:22709.0
[facility] YtnaeawLPYFDBIQG8105-54-13 04:06:003.1[facility]annCHEMISTRY
2011 04:06:001.5[facility] GivxbxfCVLCVYOJU6143-88-02 04:06:00<0.02[facility] 
NulpdtgSQJYLSQBD5019-10-86 04:06:001.1[facility] AeigwitOZYNQLIJZ9148-69-24 
04:06:22503.0[facility] JkgtvbdOYTPKXNCL9136-40-28 04:06:000.4[facility]
LOCUEVMXN1184-84-43 04:06:0044.0[facility] PruiwmrLJEMVLGWG6031-63-16 04:06:0030.0
[facility] OjpfflpXJHBCQYXZ4879-11-02 04:06:004.1[facility]annCHEMISTRY
2011 04:06:0027.0[facility] TnnrcwtXUIVMSFOI7180-53-20 04:06:00* 



             Test Item    Value        Reference Range Interpretation Comments

 

             A/G Ratio (test code = A/G Ratio) 1.0 1        0.7-1.6      N      

       





[facility] NmubeqoZNEVUJZZE2754-97-08 04:06:004.0[facility]annCHEMISTRY
2011 04:06:00* 



             Test Item    Value        Reference Range Interpretation Comments

 

             B/C Ratio (test code = B/C Ratio) 24.0 1       6-25         N      

       





[facility] MfpgmzxGJBVDVHYG7061-53-28 04:06:0018.4[facility]annCHEMISTRY
2011 04:06:81621.0[facility] XzlzlbkNQZPAQNWL0925-32-37 04:06:003.4[facility] 
EykjiaaERJQEWYSG0273-06-56 04:06:009.5[facility] YmclilnUZDCUDETX1598-23-17 
04:06:0022.0[facility] NgdwipdDHAICLZEW2912-21-49 04:06:008.1[facility]
JANLSSLIC7091-01-95 04:06:88906.0[facility] HdzrhsrMHCCKFOMJ7168-29-54 04:06:00
141.0[facility] KrldfxuRTTMQHBXJ4406-86-79 04:06:001.0[facility]annCHEMISTRY
2011 04:06:0024.0[facility] YzvqszxNOGLPUKJV1894-75-61 04:06:00* 



             Test Item    Value        Reference Range Interpretation Comments

 

             CK MB Index (test code = CK MB Index) 1.0 1        <=2.5        N  

           





[facility]JTBBIJYU7835[facility] 04:06:00* 



             Test Item    Value        Reference Range Interpretation Comments

 

             PTT (test code = PTT) 25.7 s       22.9-35.8    N             





[facility]7607[facility]46-96 04:06:00* 



             Test Item    Value        Reference Range Interpretation Comments

 

             PT (test code = PT) 13.0 s       12.0-14.7    N             





[facility]idoZZVKTBJQCB6336[facility]48-68 04:06:00* 



             Test Item    Value        Reference Range Interpretation Comments

 

             INR (test code = INR) 0.98 1       0.85-1.17    N             





[facility]

## 2020-11-15 NOTE — EMERGENCY DEPARTMENT NOTE
History of Present Illnes


History of Present Illness


Chief Complaint:  Genitourinary


History of Present Illness


This is a 88 year old  female DX UTI NOV 5TH. STARTED A 10 DAY COURSE OF

CIPRO 500 MG BID, COMPLETED. SINCE


   STARTING CIPRO SHE FEELS LIKE FOOD IS GETTING STUCK IN STOMACHE. BURNING, AND


   CRAMPING IN STOMACHE, FATIGUED, UNABLE TO SLEEP AT NIGHT ALONG WITH NIGHT


   SWEATS, TINGLING IN LEGS. DAUGHTER IS BLAMING IT ON THE MEDICATION. .


Historian:  Patient


Arrival Mode:  Car


Additional Treatment PTA:  NONE


Onset (how long ago):  day(s) (4)


Location:  ALL OVER


Quality:  FATIGUE, INDIGESTION


Radiation:  Reports non-radiation


Severity:  mild


Onset quality:  gradual


Duration (how long):  day(s) (4)


Timing of current episode:  constant


Progression:  unchanged


Chronicity:  new


Context:  Reports recent illness (UTI)


Relieving factors:  none


Exacerbating factors:  none


Associated symptoms:  Reports denies other symptoms





Past Medical/Family History


Physician Review


I have reviewed the patient's past medical and family history.  Any updates have

been documented here.





Past Medical History


Recent Fever:  No


Clinical Suspicion of Infectio:  No


New/Unexplained Change in Ment:  No


Past Medical History:  Hypertension, A-Fib


Other Medical History:  


ESOPHAGEAL STRICTURE


Past Surgical History:  Cholecysctectomy, Appendectomy, Hysterectomy, 

Pacer/AICD, Cataract Removal


Other Surgery:  


CARDIAC STENTS X 2





ESOPHAGEAL DIALATION





Social History


Smoking Cessation:  Never Smoker


Counseling Performed:  No


Alcohol Use:  None


Any Illegal Drug Use:  No





Other


Last Tetanus:  OOD


Any Pre-Existing Lines (PICC,:  No





Review of Systems


Review of Systems


Constitutional:  Reports weakness


EENTM:  Reports no symptoms


Cardiovascular:  Reports no symptoms


Respiratory:  Reports no symptoms


Gastrointestinal:  Reports no symptoms


Genitourinary:  Reports no symptoms


Musculoskeletal:  Reports no symptoms


Integumentary:  Reports no symptoms


Neurological:  Reports no symptoms


Psychological:  Reports no symptoms


Endocrine:  Reports no symptoms


Hematological/Lymphatic:  Reports no symptoms





Physical Exam


Related Data


Allergies:  


Coded Allergies:  


     No Known Allergies (Verified , 11/15/20)


Triage Vital Signs





Vital Signs








  Date Time  Temp Pulse Resp B/P (MAP) Pulse Ox O2 Delivery O2 Flow Rate FiO2


 


11/15/20 17:59 98.2 69 18 165/68 99 Room Air  








Vital signs reviewed:  Yes





Physical Exam


CONSTITUTIONAL





Constitutional:  Present well-developed, Present well-nourished; 


   Absent distressed


HENT


HENT:  Present normocephalic, Present atraumatic, Present oropharynx 

clear/moist, Present nose normal


HENT L/R:  Present left ext ear normal, Present right ext ear normal


EYES





Eyes:  Reports PERRL, Reports conjunctivae normal


NECK


Neck:  Present ROM normal


PULMONARY


Pulmonary:  Present effort normal, Present breath sounds normal


CARDIOVASCULAR





Cardiovascular:  Present regular rhythm, Present heart sounds normal, Present 

capillary refill normal, Present normal rate


GASTROINTESTINAL





Abdominal:  Present soft, Present nontender, Present bowel sounds normal


GENITOURINARY





Genitourinary:  Present exam deferred


SKIN


Skin:  Present warm, Present dry


MUSCULOSKELETAL





Musculoskeletal:  Present ROM normal


NEUROLOGICAL





Neurological:  Present alert, Present oriented x 3, Present no gross motor or 

sensory deficits


PSYCHOLOGICAL


Psychological:  Present mood/affect normal, Present judgement normal





Results


Laboratory


Result Diagram:  


11/15/20 1824                                                                   

            11/15/20 1824





Laboratory





Laboratory Tests








Test


 11/15/20


18:24 11/15/20


17:56


 


White Blood Count


 10.32 x10e3/uL


(4.8-10.8) 





 


Red Blood Count


 3.47 x10e6/uL


(3.6-5.1) 





 


Hemoglobin


 10.3 g/dL


(12.0-16.0) 





 


Hematocrit


 30.9 %


(34.2-44.1) 





 


Mean Corpuscular Volume


 89.0 fL


(81-99) 





 


Mean Corpuscular Hemoglobin


 29.7 pg


(28-32) 





 


Mean Corpuscular Hemoglobin


Concent 33.3 g/dL


(31-35) 





 


Red Cell Distribution Width


 13.3 %


(11.7-14.4) 





 


Platelet Count


 400 x10e3/uL


(140-360) 





 


Neutrophils (%) (Auto)


 66.2 %


(38.7-80.0) 





 


Lymphocytes (%) (Auto)


 22.6 %


(18.0-39.1) 





 


Monocytes (%) (Auto)


 7.4  %


(4.4-11.3) 





 


Eosinophils (%) (Auto)


 2.7 %


(0.0-6.0) 





 


Basophils (%) (Auto)


 0.6 %


(0.0-1.0) 





 


Neutrophils # (Auto) 6.8 (2.1-6.9)  


 


Lymphocytes # (Auto) 2.3 (1.0-3.2)  


 


Monocytes # (Auto) 0.8 (0.2-0.8)  


 


Eosinophils # (Auto) 0.3 (0.0-0.4)  


 


Basophils # (Auto) 0.1 (0.0-0.1)  


 


Absolute Immature Granulocyte


(auto 0.05 x10e3/uL


(0-0.1) 





 


Sodium Level


 135 mmol/L


(136-145) 





 


Potassium Level


 4.4 mmol/L


(3.5-5.1) 





 


Chloride Level


 103 mmol/L


() 





 


Carbon Dioxide Level


 24 mmol/L


(22-29) 





 


Anion Gap


 12.4 mmol/L


(8-16) 





 


Blood Urea Nitrogen


 18 mg/dL


(7-26) 





 


Creatinine


 0.95 mg/dL


(0.57-1.11) 





 


Estimat Glomerular Filtration


Rate 56 ML/MIN


(60-) 





 


BUN/Creatinine Ratio 19 (6-25)  


 


Glucose Level


 113 mg/dL


() 





 


Calcium Level


 9.1 mg/dL


(8.4-10.2) 





 


Magnesium Level


 1.6 MG/DL


(1.3-2.1) 





 


Total Bilirubin


 0.2 mg/dL


(0.2-1.2) 





 


Aspartate Amino Transf


(AST/SGOT) 24 IU/L (5-34) 


 





 


Alanine Aminotransferase


(ALT/SGPT) 29 IU/L (0-55) 


 





 


Alkaline Phosphatase


 72 IU/L


() 





 


Total Protein


 7.0 g/dL


(6.5-8.1) 





 


Albumin


 3.5 g/dL


(3.5-5.0) 





 


Globulin


 3.5 g/dL


(2.3-3.5) 





 


Albumin/Globulin Ratio 1.0 (0.8-2.0)  


 


Urine Color


 


 Yellow


(YELLOW)


 


Urine Clarity  Clear (CLEAR) 


 


Urine pH  7 (5 - 7) 


 


Urine Specific Gravity


 


 1.015


(1.010-1.025)


 


Urine Protein  1+ (NEGATIVE) 


 


Urine Glucose (UA)


 


 Negative


(NEGATIVE)


 


Urine Ketones


 


 Negative


(NEGATIVE)


 


Urine Blood


 


 Trace


(NEGATIVE)


 


Urine Nitrite


 


 Negative


(NEGATIVE)


 


Urine Bilirubin


 


 Negative


(NEGATIVE)


 


Urine Urobilinogen


 


 0.2 mg/dL (0.2


- 1)


 


Urine Leukocyte Esterase


 


 Negative


(NEGATIVE)


 


Urine RBC  0-5 /HPF (0-5) 


 


Urine WBC  0-5 /HPF (0-5) 


 


Urine Epithelial Cells


 


 Few /LPF


(NONE)


 


Urine Bacteria


 


 Rare /HPF


(NONE)








Lab results reviewed:  Yes





Assessment & Plan


Medical Decision Making


MDM


PT WITH VAGUE COMPLAINTS SINCE RECENTLY BEING ON CIPRO





CBC. CMP, UA ORDERED TO EVAL FOR LEUKOCYTOSIS, UTI, ELECTROLYTE ABNORMALITY, 

RENAL FAILURE





Assessment & Plan


Final Impression:  


(1) Insomnia


(2) Fatigue


Depart Disposition:  HOME, SELF-CARE


Last Vital Signs











  Date Time  Temp Pulse Resp B/P (MAP) Pulse Ox O2 Delivery O2 Flow Rate FiO2


 


11/15/20 17:59 98.2 69 18 165/68 99 Room Air  








Home Meds


Reported Medications


Flecainide Acetate (FLECAINIDE ACETATE) 100 Mg Tablet, 100 MG PO BID, #60 TAB


   1/24/20


Losartan Potassium (LOSARTAN POTASSIUM) 25 Mg Tablet, 50 MG PO BID


   1/22/20


Warfarin Sodium* (COUMADIN*) 3 Mg Tablet, 1.5 MG PO DAILY, #7 TAB


   2/27/17


Alendronate Sodium (FOSAMAX) 70 Mg Tablet, 70 MG PO WKLY


   2/27/17


Pravastatin Sodium (PRAVASTATIN SODIUM) 40 Mg Tablet, 40 MG PO HS


   2/27/17


Amlodipine Besylate (AMLODIPINE BESYLATE) 5 Mg Tablet, 5 MG PO DAILY, #30 TAB


   2/27/17


Pantoprazole Sodium* (PROTONIX) 40 Mg Tablet.dr, 40 MG PO DAILY, TAB


   2/27/17


Fenofibrate Nanocrystallized (FENOFIBRATE) 145 Mg Tablet, 145 MG PO DAILY


   2/27/17











ANGIE DE LOS SANTOS MD        Nov 15, 2020 19:29

## 2020-11-15 NOTE — XMS REPORT
Continuity of Care Document

                             Created on: 2020



JOCELYNE COLVIN

External Reference #: [id number]

: 1932

Sex: Female



Demographics





                          Address                   [id number] [place] [address] [place]  [address]

 

                          Home Phone                [phone]

 

                          Preferred Language        English

 

                          Marital Status            Unknown

 

                          M[employer]on Affiliation     Unknown

 

                          Race                      Unknown

 

                          Ethnic Group              Unknown





Author





                          Author                    EdynJOCELYNE              Edyn

 

                          Address                   Unknown

 

                          Phone                     Unavailable







Care Team Providers





                    Care Team Member Name Role                Phone

 

                    [employer] Unavailable         Un

available



                                    



Problems

                    



                    Problem                         Status                      

   Onset Date       

                          Classification                         Date Reported  

         

                          Comments                         Source               

     

 

                    ANTICOAGULATION                         Active              

           

2020                                                                      

 

                                                    [facility]                

    

 

                    Fever, unspecified                                          

        10/30/2020  

                                                    2020                  

     

                                                    [facility]                

    

 

                    Other fatigue                                               

   10/30/2020       

                                             2020                         

   

                                        [facility]                    

 

                    WEAKNESS                         Active                     

    10/30/2020      

                                                                                

  

                                        [facility]                    

 

                          DUAL CHAMBER PACEMAKER IMPLANT                        

 Active                   

                    10/05/2020                                                  

               

                                                    [facility]                

    

 

                    LEFT HEART CATH                         Active              

           

2020                                                                      

 

                                                    [facility]                

    

 

                    ANTICOAG                         Active                     

    2020      

                                                                                

  

                                        [facility]                    

 

                          DYSPNEA ON EXERTION, CHEST PRESSURE                   

      Active              

                    2020                                                  

          

                                                    [facility]                

    

 

                    RAPID HEART RATE                         Active             

            

2020                                                                      

 

                                                    [facility]                

    

 

                    CHEST PAIN                         Active                   

      05/15/2020    

                                                                                

                                        [facility]                    

 

                    ACUTE CHEST PAIN                         Active             

            

05/15/2020                                                                      

 

                                                    [facility]                

    

 

                    LEFT HEART CATH POSS PCI                         Active     

                    

2020                                                                      

 

                                                    [facility]                

    

 

                    Weakness                                                  2020                         

        

                                        [facility]                    

 

                          HYPOMAGNESEMIA, PALPITATIONS                         A

ctive                     

                    2019                                                  

                 

                                                    [facility]                

    

 

                    AFIB                         Active                         

2019          

                                                                                

      

                                        [facility]                    

 

                                        Encounter for screening mammogram for ma

lignant neoplasm of breast              

                                             2018                                                  

[facility]                    

 

                          DX: Z12.31/M81.0**NO PAIN,LUMPS OR DC*                

         Active           

                    2018                                                  

       

                                                    [facility]                

    

 

                          Unspecified fall, initial encounter                   

                          

                    2018      

                                                    [facility]                

    

 

                                        Multiple fractures of ribs, unspecified 

side, initial encounter for closed 

fracture                                                   2018                         

        

                                        [facility]                    

 

                          Urinary tract infection, site not specified           

                          

                    2018                                                   [facility]       

 

           

 

                    FAL                         Active                         0

2018           

                                                                                

       

                                        [facility]                    

 

                    Cervicalgia                                                 

 2017                         

     

                                        [facility]                    

 

                                        Contusion of unspecified part of head, i

nitial encounter                        

                                             2017                                                  

[facility]    

               

 

                    FALL                         Active                         

2017          

                                                                                

      

                                        [facility]                    

 

                          DX:I65.29= OCCLUSION AND STENOSIS OF UNS              

           Active         

                    2016                                                  

     

                                                    [facility]                

    

 

                          Discharge Diagnosis: Acute lower UTI                  

                          

                    2016     

                                                    [facility]                

    

 

                    UTI                         Active                         0

2016           

                                                                                

       

                                        [facility]                    

 

                          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              

           Active         

                    2016                                                  

     

                                                    [facility]                

    

 

                          R92.8=OTHER ABNORMAL AND INCONCLUSIVE FI              

           Active         

                    2016                                                  

     

                                                    [facility]                

    

 

                    ROUTINE                         Active                      

   2016       

                                                                                

   

                                        [facility]                    

 

                    SCREENING                         Active                    

     2014     

                                                                                

 

                                        [facility]                    

 

                    UNK                         Active                         0

2013           

                                                                                

       

                                        [facility]                    

 

                    A-FIB WITH RVR                         Active               

          2011

                                                                                

                                        [facility]                    

 

                    OTHER                         Active                        

 2011         

                                                                                

     

                                        [facility]                    

 

                    [D]Chest pain                         Active                

                    

                          Problem                         2012            

            

                                                    [facility]                

    

 

                    Atrial fibrillation                         Active          

                    

                          Problem                         2012            

      

                                                    [facility]                

    

 

                          [D]Chest pain (context-dependent category)            

             Active       

                                             Problem                         

2020                                                   [facility]       

 

            

 

                          Atrial fibrillation (disorder)                        

 Active                   

                                             Problem                         2020       

                                                    [facility]                

    

 

                          Hypertensive disorder, systemic arterial (disorder)   

                      

Resolved                                                   Problem              

 

                    2020                                                  

[facility]                    

 

                    Reflux (finding)                         Resolved           

                    

                          Problem                         2020            

       

                                                    [facility]                

    

 

                    Pain in left hip                                            

                    

                                             2017                         

         

                                        [facility]                    

 

                                        Age-related osteoporosis without current

 pathological fracture                  

                                                                                

             

                    2019                                                  

[facility]      

              

 

                    Hypertension                         Active                 

                    

                                             2014                         

well-

controlled.                             [facility]                    

 

                    Hyperlipidemia                         Active               

                    

                                             2014                         

     

                                        [facility]                    

 

                    Coronary Artery Disease                         Active      

                    

                                                    2014                  

   

                          Stable with no angina                         [facility]                 

   

 

                          Paroxysmal Atrial Fibrillation                        

 Active                   

                                                                      2014

              

                                                    [facility]               

     

 

                    Osteoporosis                         Active                 

                    

                                             2013                         

       

                                        [facility]                    

 

                    Esophageal Reflux                         Active            

                    

                                             2013                         

  

                                        [facility]                    

 

                    ATRIAL FIBRILLATION                         Active          

                    

                                                                                

                                        [facility]                    

 

                    SCREEN MAMMOGRAM NEC                         Active         

                    

                                                                                

                                        [facility]                    

 

                    JOINT PAIN-SHLDER                         Active            

                    

                                                                                

  

                                        [facility]                    

 

                          ENCNTR SCREEN MAMMOGRAM FOR MALIGNANT NE              

           Active         

                                                                                

    

                                                    [facility]                

    

 

                          OTH SYMPTOMS AND SIGNS INVOLVING THE CIR              

           Active         

                                                                                

    

                                                    [facility]                

    

 

                          OTH ABN AND INCONCLUSIVE FINDINGS ON DX               

          Active          

                                                                                

     

                                                    [facility]                

    

 

                    E78.5                         Active                        

                    

                                                                                

              

                                        [facility]                    

 

                          HYPERLIPIDEMIA, UNSPECIFIED                         Ac

tive                      

                                                                                

             

                                        [facility]                    

 

                          PAROXYSMAL ATRIAL FIBRILLATION                        

 Active                   

                                                                                

              

                                                    [facility]                

    

 

                    BRADYCARDIA, UNSPECIFIED                         Active     

                    

                                                                                

                                        [facility]                    

 

                          VARICOSE VEINS OF OTHER SPECIFIED SITES               

          Active          

                                                                                

     

                                                    [facility]                

    

 

                    PAIN IN UNSPECIFIED LIMB                         Active     

                    

                                                                                

                                        [facility]                    

 

                          OCCLUSION AND STENOSIS OF UNSPECIFIED CA              

           Active         

                                                                                

    

                                                    [facility]                

    

 

                    LOW BACK PAIN                         Active                

                    

                                                                                

      

                                        [facility]                    

 

                    PLEURODYNIA                         Active                  

                    

                                                                                

        

                                        [facility]                    

 

                    HISTORY OF FALLING                         Active           

                    

                                                                                

 

                                        [facility]                    

 

                    PAIN IN LEFT HIP                         Active             

                    

                                                                                

   

                                        [facility]                    

 

                          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              

           Active         

                                                                                

    

                                                    [facility]                

    

 

                    HYPOMAGNESEMIA                         Active               

                    

                                                                                

     

                                        [facility]                    

 

                    PALPITATIONS                         Active                 

                    

                                                                                

       

                                        [facility]                    

 

                          ANGINA PECTORIS, UNSPECIFIED                         A

ctive                     

                                                                                

            

                                        [facility]                    



                                                                                
                                                                                
                                                                                
                                                                                
                                                                                
                                                                                
                                                                                
                                                                                
                                                                                
                                                                                
                                                                                
                                                                                
                                                                                
                                                                                
                                        



Medications

                    



                    Medication                         Details                  

       Route        

                          Status                         Patient Instructions   

         

                          Ordering Provider                         Order Date  

             

                                        Source                    

 

                          Acetaminophen                         Notes: Do not ex

ceed 4 gm/day.  (Same as: 

Tylenol)                                                   Inactive             

 

                                                                      10/31/2020

         

                                        [facility]                    

 

                                        Acetaminophen 300 MG / Codeine Phosphate

 30 MG Oral Tablet [Tylenol with Codeine

 #3]                                    Notes: Do not exceed 4gm/day of acetamin

ophen.  

(Same as: Tylenol with Codeine # 3)                                             

                    Inactive                                                    

                

                          10/18/2020                         [facility]       

             

 

                                        Acetaminophen 300 MG / Codeine Phosphate

 30 MG Oral Tablet [Tylenol with Codeine

 #3]                                    Notes: Do not exceed 4gm/day of acetamin

ophen.  

(Same as: Tylenol with Codeine # 3)                                             

                    No Longer Active                                            

                

                          10/18/2020                         [facility]       

            



 

                    Bumex                         Notes: (Same As: Bumex)       

                    

                          No Longer Active                                      

    

                                             10/17/2020                         

[facility] 

                  

 

                          clopidogrel                         Notes: (Same As: P

lavix)                    

                                             No Longer Active                   

                

                                             10/17/2020                         

[facility]                    

 

                          Amlodipine                         Notes: (Same as: No

rvasc)                    

                                             No Longer Active                   

                

                                             10/17/2020                         

[facility]                    

 

                          atorvastatin                         Notes: (Same as: 

Lipitor)                  

                                             No Longer Active                   

              

                                             10/17/2020                         

[facility]                    

 

                          Flecainide                         Notes: (Same as: Ta

mbocor)                   

                                             No Longer Active                   

               

                                             10/17/2020                         

[facility]                    

 

                          Losartan                         Notes: (Same as: Coza

ar)                       

                                             No Longer Active                   

                   

                                             10/17/2020                         

[facility]                    

 

                          pantoprazole                         Notes: Tablet amanda

uld not be chewed or 

crushed. (Same as: Protonix)                                                   N

o

Longer Active                                                                   

 

                          10/16/2020                         [facility]       

             

 

                                        influenza virus vaccine, inactivated hig

h-dose preservative-free intramuscular 

suspension                              Notes: (Same as: Fluzone High-Dose Quad)

   

For 65 years of age of older (0.7 ml IM) Shake well before use                  
                                             No Longer Active                   

              

                                             10/16/2020                         

[facility]                    

 

                          Tylenol                         Notes: Max acetaminoph

en 4000 mg/day (4 gm/day).

 (Same as: Tylenol Extra Strength)                                              

                    No Longer Active                                            

                 

                          10/16/2020                         [facility]       

             

 

                          Morphine                         Notes: (Same as:MORPh

ine Sulfate)              

                                             No Longer Active                   

          

                                             10/16/2020                         

[facility]                    

 

                          Minocycline                         Notes: (Same as:Mi

nocin)  No 

milk/antacids/iron.                                                   No Longer 

Active                                                                          

 

                          10/16/2020                         [facility]       

             

 

                          minocycline 100 mg oral capsule                       

  100 mg, PO, [id number], # 

24 tab, 0 Refill(s), Pharmacy: [id number], 152.4, cm, 10/16/20 
6:58:00 CDT, Height, 58.182, kg, 10/16/20 6:58:00 CDT, Weight                   
                                             Active                             

               

                                             10/16/2020                         

[facility]   

                

 

                          Sodium Chloride 0.9% IV 1,000 mL                      

   1,000 mL, Rate: 100 

ml/hr, Infuse over: 10 hr, Route: IV, Dosing Weight 58.182 kg, Total Volume: 
1,000, Start date: 10/02/20 10:15:00 CDT, Duration: 10 hr, Stop date: 10/02/20 
20:14:00 CDT, 1.59, m2, 0                                                   

Inactive                                                                        

 

                          10/02/2020                         [facility]       

             

 

                          Acetaminophen                         Notes: Do not ex

ceed 4 gm/day.  (Same as: 

Tylenol)                                                   Inactive             

 

                                                                      10/02/2020

          

                                        [facility]                    

 

                          Morphine                         Notes: (Same as:MORPh

ine Sulfate)              

                                             Inactive                           

          

                                             10/02/2020                         

[facility]                    

 

                          Sodium Chloride 0.9% (Bolus) IV                       

  174 mL, 100 ml/hr, 

Infuse Over: 1.7 hr, Route: IV, 174, Drug form: INJ, ONCE, Dosing Weight 58.182 
kg, Start date: 10/02/20 7:32:00 CDT, Stop date: 10/02/20 7:32:00 CDT, 0        
                                             Inactive                           

    

                                             10/02/2020                         

[facility]                    

 

                          Ventolin HFA                         2 puff, INHALATIO

N, Q6H, 0 Refill(s)       

                                             Active                             

   

                                             10/02/2020                         

[facility]                    

 

                          Bumex                         1 mg, PO, Daily, 0 Refil

l(s)                      

                                             Active                             

                  

                                             10/02/2020                         

[facility]      

             

 

                          Aspirin 81 MG Enteric Coated Tablet                   

      Notes: Do not crush 

or chew. (Same As: Ecotrin)                                                   

Inactive                                                                        

 

                          2020                         [facility]       

             

 

                          Flecainide                         Notes: (Same as: Ta

mbocor)                   

                                             Inactive                           

               

                                             2020                         

[facility] 

                  

 

                                        24 HR Metoprolol Tartrate 25 MG Extended

 Release Tablet [Toprol]                

                          Notes: (Same as: Toprol XL) Do Not Crush              

                 

                    Inactive                                                    

 

                          2020                         [facility]       

    

         

 

                          pantoprazole                         Notes: Tablet amanda

uld not be chewed or 

crushed.                                                   Inactive             

 

                                                                      2020

         

                                        [facility]                    

 

                          Simethicone                         Notes: (Same as: M

ylanta Gas)               

                                             Inactive                           

          

                                             2020                         

[facility]                    

 

                          clopidogrel                         Notes: (Same As: P

lavix)                    

                                             No Longer Active                   

               

                                             2020                         

[facility]                    

 

                          Amlodipine                         Notes: (Same as: No

rvasc)                    

                                             No Longer Active                   

               

                                             2020                         

[facility]                    

 

                          atorvastatin                         Notes: (Same as: 

Lipitor)                  

                                             No Longer Active                   

             

                                             2020                         

[facility]                    

 

                          Losartan                         Notes: (Same as: Coza

ar)                       

                                             No Longer Active                   

                  

                                             2020                         

[facility]                    

 

                          Nitroglycerin 0.4 MG Sublingual Tablet                

         Notes: (Same 

as:Nitroquick, Nitrostat) "Do Not Crush"  Sublingual tablet                     
                                             No Longer Active                   

                

                                             2020                         

[facility]                    

 

                          Gas-X Maximum Strength                         Notes: 

(Same as: Mylicon)        

                                             Inactive                           

   

                                             2020                         

[facility]                    

 

                          Coumadin                         Notes: Nurse to carla

e documentation of patient

 education per anticoagulation policy. Avoid large intake of vitamin-K 
containing foods diet. (Same As: Coumadin)  WASTE: F/P - P Waste Black; E - P 
Waste Black  Hazardous Drug Group 3:Reproductive risk Hazardous Drug -- Refer to
 safe handling procedure PPE Matrix                                             

                    Inactive                                                    

               

                          2020                         [facility]       

             

 

                          Aspirin                         325 mg, Route: PO, Joe

g form: TAB, ONCE, Dosing 

Weight 56.818, kg, Priority: STAT, Start date: 20 10:57:00 CDT, Stop date:
 20 10:57:00 CDT                                                   Inactiv

e

                                                                           

2020                              [facility]                    

 

                          simethicone 125 mg oral tablet, chewable              

           125 mg = 1 tab,

 CHEW, QID, X 12 day, # 48 tab, 0 Refill(s), Pharmacy: [id number] 
                                                 Active                         

                                                    2020                  

  

                                        [facility]                    

 

                          clopidogrel 75 mg oral tablet                         

75 mg = 1 tab, PO, Daily, 

# 60 tab, 0 Refill(s), Pharmacy: [id number]                       
                                             Active                             

                  

                                             2020                         

[facility]     

               

 

                          Aspirin 81 MG Enteric Coated Tablet                   

      81 mg = 1 tab, PO, 

Daily, 0 Refill(s)                                                   Active     

 

                                                                      2020

 

                                        [facility]                    

 

                          clopidogrel 75 mg oral tablet                         

75 mg = 1 tab, PO, Daily, 

0 Refill(s)                                                   Inactive          

 

                                                                      2020

      

                                        [facility]                    

 

                          Simethicone                         Notes: (Same as: HENRI kulkarni)                   

                                             Inactive                           

              

                                             2020                         

[facility]                    

 

                          Simethicone                         Notes: (Same as: HENRI kulkarni)                   

                                             Inactive                           

              

                                             2020                         

[facility]                    

 

                          Aspirin 81 MG Enteric Coated Tablet                   

      81 mg, 1 tab, Route:

 PO, Drug form: ECTAB, Daily, Dosing Weight 50.909, kg, Start date: 20 
9:00:00 CDT, Duration: 30 day, Stop date: 20 9:00:00 CDT, 0               
                                             Inactive                           

          

                                             2020                         

[facility]                    

 

                          clopidogrel                         Notes: (Same As: P

lavix)                    

                                             Inactive                           

               

                                             2020                         

[facility]

                    

 

                          Amlodipine                         Notes: (Same as: No

rvasc)                    

                                             No Longer Active                   

               

                                             2020                         

[facility]                    

 

                          atorvastatin                         Notes: (Same as: 

Lipitor)                  

                                             No Longer Active                   

             

                                             2020                         

[facility]                    

 

                          Flecainide                         Notes: (Same as: Ta

mbocor)                   

                                             No Longer Active                   

              

                                             2020                         

[facility]                    

 

                          Losartan                         Notes: (Same as: Coza

ar)                       

                                             No Longer Active                   

                  

                                             05/15/2020                         

[facility]                    

 

                          pantoprazole                         40 mg, 1 tab, Rou

te: PO, Drug form: ECTAB, 

BID, Dosing Weight 50.909, kg, Start date: 05/15/20 17:00:00 CDT, Duration: 30 
day, Stop date: 20 9:00:00 CDT, 0                                         

                    No Longer Active                                            

           

                          05/15/2020                         [facility]       

      

       

 

                          Warfarin                         Notes: Nurse to ensur

e documentation of patient

 education per anticoagulation policy. Avoid large intake of vitamin-K 
containing foods diet. (Same As: Coumadin)  WASTE: F/P - P Waste Black; E - P 
Waste Black  Hazardous Drug Group 3:Reproductive risk Hazardous Drug -- Refer to
 safe handling procedure PPE Matrix                                             

                    No Longer Active                                            

               

                          05/15/2020                         [facility]       

          

   

 

                          ***Waiting for INR results***                         

***Waiting for INR 

results***, ***Waiting for INR results***, Drug form: MISC, Route: MISC, ONCALL,
 05/15/20 15:00:00 CDT, Duration: 30 day, Stop date: 20 14:59:00 CDT, 0   
                                               Inactive                         

                                                    05/15/2020                  

  

                                        [facility]                    

 

                          Sodium Chloride 0.9% IV 1,000 mL                      

   1,000 mL, Rate: 100 

ml/hr, Infuse over: 10 hr, Route: IV, Dosing Weight 50.909 kg, Total Volume: 
1,000, Start date: 05/15/20 14:45:00 CDT, Duration: 10 hr, Stop date: 20 
0:44:00 CDT, 1.48, m2, 0                                                   No 

Longer Active                                                                   

 

                          05/15/2020                         [facility]       

             

 

                          Nitroglycerin                         0.4 mg, 1 tab, R

oute: SL, Drug form: TAB, 

Q5Min, Dosing Weight 50.909, kg, PRN Chest Pain, Start date: 05/15/20 14:45:00 
CDT, Duration: 3 doses or times, Stop date: Limited # of times, 0               
                                             No Longer Active                   

          

                                                    05/15/2020                  

      

                                        [facility]                    

 

                                        Albuterol 0.833 MG/ML / Ipratropium Brom

luis 0.167 MG/ML Inhalant Solution 

[DuoNeb]                                Notes: (Same as: Duoneb)                

       

                                             No Longer Active                   

                  

                                             05/15/2020                         

[facility]                    

 

                          Dextrose 50% Syringe (D50W)                         12

.5 gm, 25 mL, Route: IVP, 

Drug Form: INJ, Dosing Weight 59.091, kg, PRN, PRN Blood Glucose Results, Start 
date: 05/15/20 8:54:00 CDT, Duration: 30 day, Stop date: 20 8:53:00 CDT, 0
                                                    No Longer Active            

  

                                                                      05/15/2020

         

                                        [facility]                    

 

                          Glucagon                         1 mg, Route: IM, Drug

 form: PDR/INJ, PRN, 

Dosing Weight 59.091, kg, PRN Blood Glucose Results, Start date: 05/15/20 
8:54:00 CDT, Duration: 30 day, Stop date: 20 8:53:00 CDT, 0               
                                             No Longer Active                   

          

                                                    05/15/2020                  

      

                                        [facility]                    

 

                          Ondansetron                         Notes: (Same as: VASU francis)   *** MEDICATION 

WASTE *** Product Size:  4 mg Product Wasted:  ___ mg                           

                          No Longer Active                                      

   

                                             05/15/2020                         

[facility]                    

 

                          normal saline 0.9% IV 1,000 mL                        

 1,000 mL, Rate: 100 

ml/hr, Infuse over: 10 hr, Route: IV, Dosing Weight 59.091 kg, Total Volume: 
1,000, Start date: 05/15/20 8:25:00 CDT, Duration: 30 day, Stop date: 20 
8:24:00 CDT, 1.6, m2, 0                                                   

Inactive                                                                        

 

                          05/15/2020                         [facility]       

             

 

                          Saline Flush 0.9%                         Notes: (Same

 as: BD Posiflush)        

                                                    No Longer Active            

          

                                                                      05/15/2020

                 

                                        [facility]                    

 

                          Sodium Chloride 0.9% (Bolus) IV                       

  1,000 mL, 1000 ml/hr, 

Infuse Over: 1 hr, Route: IV, 1,000, Drug form: INJ, ONCE, Priority: STAT, 
Dosing Weight 81.818 kg, Start date: 20 13:54:00 CST, Stop date: 20 
13:54:00 CST, 0                                                   Inactive      

 

                                                                      2020

  

                                        [facility]                    

 

                          Ondansetron                         Notes: (Same as: VASU francis)   *** MEDICATION 

WASTE *** Product Size:  4 mg Product Wasted:  ___ mg                           

                          Inactive                                              

   

                                             2020                         

[facility]       

             

 

                          atorvastatin                         Notes: (Same as: 

Lipitor)                  

                                             Inactive                           

             

                                             2019                         

[facility]                    

 

                          Warfarin                         Notes: Nurse to carla

e documentation of patient

 education per anticoagulation policy. Avoid large intake of vitamin-K 
containing foods diet. (Same As: Coumadin)  WASTE: F/P - P Waste Black; E - P 
Waste Black                                                   Inactive          

 

                                                                      2019

      

                                        [facility]                    

 

                                        Calcium Carbonate 1500 MG / Cholecalcife

rol 800 UNT Oral Tablet [Caltrate Plus D

 600/800]                               1 tab, PO, BID, # 60 tab, 0 Refill(s), 

Pharmacy: [id number]                                              

                    Active                                                      

                 

                          2019                         [facility]       

             

 

                          magnesium oxide 400 mg oral tablet                    

     400 mg = 1 tab, PO, 

Daily, X 30 day, # 30 tab, 1 Refill(s), Pharmacy: [id number]      
                                             Active                             

  

                                             2019                         

[facility]                    

 

                          Magnesium Sulfate                         Notes: WASTE

: F/P - Sink; E - 

Municipal Trash Bin                                                   Inactive  

 

                                                                       

2019                              [facility]                    

 

                          Amlodipine                         Notes: (Same as: No

rvasc)                    

                                             Inactive                           

                

                                             2019                         

[facility]  

                 

 

                          Losartan                         Notes: (Same as: Paulette sousa)                       

                                             Inactive                           

                   

                                             2019                         

[facility]     

              

 

                                        metoprolol succinate 25 mg oral capsule,

 extended release                       

                                        metoprolol succinate 25 mg oral capsule,

 extended release, 25 mg, Route: PO, 

Daily, 19 9:00:00 CST, Duration: 30 day, Stop date: 20 9:00:00 CST  
                                               Inactive                         

                                                    2019                  

   

                                        [facility]                    

 

                          pantoprazole                         Notes: Tablet amanda

uld not be chewed or 

crushed. (Same as: Protonix)                                                   

Inactive                                                                        

 

                          2019                         [facility]       

             

 

                          metoprolol                         Notes: (Same as: To

prol XL) Do Not Crush     

                                             Inactive                           

 

                                                    2019                  

      

                                        [facility]                    

 

                          Flecainide                         Notes: (Same as: Ta

mbocor)                   

                                             Inactive                           

               

                                             2019                         

[facility] 

                  

 

                          Amlodipine                         Notes: (Same as: No

rvasc)                    

                                             Inactive                           

                

                                             2019                         

[facility]  

                 

 

                          amLODIPine 2.5 mg oral tablet                         

2.5 mg = 1 tab, PO, 

Bedtime, 0 Refill(s)                                                   Active   

 

                                                                      2019

                                        [facility]                    

 

                          calcium gluconate + Sodium Chloride 0.9%  mL    

                     

Notes: WASTE: F/P - Sink; E - Municipal Trash Bin                               

                          No Longer Active                                      

        

                                             2019                         

[facility]     

              

 

                          Dextrose 50% Syringe (D50W)                         12

.5 gm, 25 mL, Route: IVP, 

Drug Form: INJ, Dosing Weight 59.091, kg, PRN, PRN Blood Glucose Results, Start 
date: 19 21:24:00 CST, Duration: 30 day, Stop date: 20 21:23:00 CST,
0                                                   No Longer Active            

 

                                                                      2019

         

                                        [facility]                    

 

                          Glucagon                         1 mg, Route: IM, Drug

 form: PDR/INJ, PRN, 

Dosing Weight 59.091, kg, PRN Blood Glucose Results, Start date: 19 
21:24:00 CST, Duration: 30 day, Stop date: 20 21:23:00 CST, 0             
                                             No Longer Active                   

         

                                                    2019                  

      

                                        [facility]                    

 

                          Acetaminophen                         Notes: Do not ex

ceed 4 gm/day.  (Same as: 

Tylenol)                                                   No Longer Active     

 

                                                                      2019

  

                                        [facility]                    

 

                          Calcium Gluconate                         3,000 mg, Ro

[employer]: IVPB, Drug form: INJ,

ONCE, Dosing Weight 59.091, kg, Priority: STAT, Start date: 19 21:24:00 
CST, Stop date: 19 21:24:00 CST                                           

                    Inactive                                                    

              

                          2019                         [facility]       

             

 

                          Magnesium Sulfate                         Notes: WASTE

: F/P - Sink; E - 

Municipal Trash Bin                                                   Inactive  

 

                                                                       

2019                              [facility]                    

 

                          Calcium Gluconate                         Notes: WASTE

: F/P - Sink; E - 

Municipal Trash Bin                                                   Inactive  

 

                                                                       

2019                              [facility]                    

 

                                        metoprolol succinate 25 mg oral capsule,

 extended release                       

                          25 mg = 1 cap, PO, Daily, 0 Refill(s)                 

                         

                    Active                                                      

             

                          2019                         [facility]       

             

 

                          losartan 50 mg oral tablet                         50 

mg = 1 tab, PO, BID, 0 

Refill(s)                                                   Active              

 

                                                                      2019

           

                                        [facility]                    

 

                          warfarin 2 mg oral tablet                         2 mg

 = 1 tab, PO, Daily, 0 

Refill(s)                                                   Active              

 

                                                                      2019

           

                                        [facility]                    

 

                          pantoprazole 40 mg oral enteric coated tablet         

                40 mg = 1 

tab, PO, Daily, 0 Refill(s)                                                   

Active                                                                          

 

                          2019                         [facility]       

             

 

                          amLODIPine 5 mg oral tablet                         5 

mg = 1 tab, PO, Daily, 0 

Refill(s)                                                   Active              

 

                                                                      2019

           

                                        [facility]                    

 

                          atorvastatin 40 mg oral tablet                        

 40 mg = 1 tab, PO, 

Bedtime, # 30 tab, 0 Refill(s)                                                  

 

Active                                                                          

 

                          2019                         [facility]       

             

 

                          tramadol hydrochloride 50 MG Oral Tablet              

           50 mg = 1 tab, 

PO, Q8H, PRN Pain, X 7 day, # 21 tab, 0 Refill(s)                               

                    Active                                                      

  

                          2018                         [facility]       

        

    

 

                          tizanidine 2 mg oral tablet                         2 

mg = 1 tab, PO, Q8H, PRN 

for muscle spasms, # 21 tab, 0 Refill(s)                                        

                    Active                                                      

           

                          2018                         [facility]       

             

 

                                        Acetaminophen 300 MG / Codeine Phosphate

 30 MG Oral Tablet [Tylenol with Codeine

 #3]                                    1 tab, PO, Q6H, PRN Pain, X 7 day, # 28 

tab, 0 

Refill(s)                                                   Active              

 

                                                                      2018

           

                                        [facility]                    

 

                          Fentanyl                         Notes: (Same as: Subl

imaze)  Preservative free.

                                                    Inactive                    

   

                                                                      2018

                   

                                        [facility]                    

 

                          Cephalexin 500 MG Oral Capsule [Keflex]               

          500 mg = 1 cap, 

PO, TID, X 10 day, # 30 cap, 0 Refill(s)                                        

                    Active                                                      

          

                          2016                         [facility]       

             

 

                          Rocephin                         1 gm, Route: IVPB, Dr

ug form: PDR/INJ, ONCE, 

Dosing Weight 61.818, kg, Priority: STAT, Start date: 16 10:51:00 CDT, 
Stop date: 16 10:51:00 CDT                                                

                    Inactive                                                    

                  

                          2016                         [facility]       

             

 

                          Tylenol                         650 mg, Route: PO, Joe

g form: TAB, ONCE, Dosing 

Weight 62.727, kg, Priority: STAT, Start date: 16 8:23:00 CDT, Stop date: 
16 8:23:00 CDT                                                   Inactive 

 

                                                                         

2016                              [facility]                    

 

                          Enoxaparin Sodium 40 MG/0.4ML Subcutaneous Solution   

                      ; 

Start Date: 2013; End Date: 1900 (Active)                           

                       Active                                                   

                          2013                         [facility]      

  

            

 

                          Diovan 160 MG Oral Tablet                         ; St

art Date: 2013; End 

Date: 1900 (Active)                                                   

Active                                                                          

 

                          2013                         [facility]      

              

 

                          Enoxaparin Sodium 40 MG/0.4ML Subcutaneous Solution   

                      ; 

Start Date: 2013; End Date: 1900 (Active)                           

                       Active                                                   

                          2013                         [facility]      

  

            

 

                          Pravastatin Sodium 40 MG Oral Tablet                  

       ; Start Date: 

2013; End Date: 1900 (Active)                                       

                    Active                                                      

         

                          2013                         [facility]      

              

 

                          Warfarin Sodium 3 MG Oral Tablet                      

   ; Start Date: 

2012; End Date: 1900 (Active)                                       

                    Active                                                      

         

                          2012                         [facility]      

              

 

                          Coumadin                         5 mg, 1 tab, Route: P

O, Drug form: TAB, Q5PM, 

Start date: 11 17:00:00, Duration: 30 day, Stop date: 11 17:00:00   
                          PO                         No Longer Active           

    

                                             [place]                              

                                        [facility]                    

 

                          Sodium Chloride 0.45% IV 1,000 mL                     

    1,000 mL, Rate: 125 

ml/hr, Infuse over: 8 hr, Route: IV, Total Volume: 1,000, Start date: 11 
11:03:00, Duration: 6 hr, Stop date: 11/15/11 1:02:00                         IV

                          No Longer Active                                      

 

                    Al-Azze                         2011                 

        

[facility]                    

 

                          Protonix                         40 mg, 1 tab, Route: 

PO, Drug form: ECTAB, 

Before Dinner, Start date: 11 16:30:00, Duration: 30 day, Stop date: 
11 16:30:00                         PO                         No Longer 

Active                                                   [place]                  

 

                          2011                         [facility]       

             

 

                          nitroglycerin 0.4 mg sublingual tablet                

         0.4 mg, 1 tab, 

Route: SL, Drug form: TAB, Q5Min, PRN Chest Pain, Start date: 11 15:22:00,
 Duration: 30 day, Stop date: 11 15:21:00                         SL      

                          No Longer Active                                      

       

                    [place]apat                         2011                    

     [facility]                    

 

                          atropine                         0.5 mg, 5 mL, Route: 

IVP, Drug form: INJ, PRN, 

PRN Bradycardia, Start date: 11 15:22:00, Duration: 30 day, Stop date: 
11 15:21:00                         IVP                         No Longer 

Active                                                   [place]                  

 

                          2011                         [facility]       

             

 

                          Lanoxin                         0.125 mg, 0.5 mL, Rout

e: IV, Drug form: INJ, 

QAM, Start date: 11 9:00:00, Duration: 30 day, Stop date: 11 9:00:00
                          IV                         No Longer Active           

 

                                             Ibanez                         11/1

3/2011 

                                        [facility]                    

 

                          Prilosec                         20 mg, Route: PO, Joe

g form: DRC, Daily, Start 

date: 11 9:00:00, Duration: 30 day, Stop date: 11 9:00:00           
                          PO                         No Longer Active           

            

                                             Km                                        

                                        [facility]                    

 

                          Diovan HCT 80 mg-12.5 mg oral tablet                  

       2 tab, Route: PO, 

Drug Form: TAB, Daily, Start date: 11 9:00:00, Duration: 30 day, Stop 
date: 11 9:00:00                         PO                         No 

Longer Active                                                   Km             

 

                          2011                         [facility]       

             

 

                          gemfibrozil                         600 mg, 1 tab, Rou

te: PO, Drug form: TAB, 

BID, Start date: 11 9:00:00, Duration: 30 day, Stop date: 11 
17:00:00                         PO                         No Longer Active    

                                              Km                         

2011                              [facility]                    

 

                          hydrochlorothiazide 25 mg oral tablet                 

        25 mg, 1 tab, 

Route: PO, Drug form: TAB, Daily, Start date: 11 9:00:00, Duration: 30 
day, Stop date: 11 9:00:00                         PO                     

                    No Longer Active                                            

      Km       

                          2011                         [facility]       

        

     

 

                          Diovan                         160 mg, 2 tab, Route: P

O, Drug form: TAB, Daily, 

Start date: 11 9:00:00, Duration: 30 day, Stop date: 11 9:00:00     
                          PO                         No Longer Active           

      

                                             Km                         

011         

                                        [facility]                    

 

                          Lanoxin                         0.25 mg, 1 mL, Route: 

IV, Drug form: INJ, ONCE, 

Start date: 11 19:20:00, Stop date: 11 19:20:00                     
                    IV                         No Longer Active                 

                

                          [place]                         2011              

         

                                        [facility]                    

 

                          Tylenol                         650 mg, 2 tab, Route: 

PO, Drug form: TAB, Q6H, 

PRN Pain, Start date: 11 19:01:00, Duration: 30 day, Stop date: 11 
19:00:00                         PO                         No Longer Active    

                                              Km                         

2011                              [facility]                    

 

                          morphine Sulfate                         2 mg, 1 mL, R

oute: IVP, Drug form: INJ,

 Q4H, PRN Pain, Start date: 11 19:01:00, Duration: 30 day, Stop date: 
11 19:00:00                         IVP                         No Longer 

Active                                                   Km                    

 

                          2011                         [facility]       

             

 

                          Norco 5/325 oral tablet                         1 tab,

 Route: PO, Drug Form: 

TAB, Q6H, PRN Pain, Start date: 11 19:01:00, Duration: 30 day, Stop date: 
11 19:00:00                         PO                         No Longer 

Active                                                   Km                    

 

                          2011                         [facility]       

             

 

                          Zofran                         4 mg, 2 mL, Route: IV, 

Drug form: INJ, Q6H, PRN 

Nausea, Start date: 11 19:01:00, Duration: 30 day, Stop date: 11 
19:00:00                         IV                         No Longer Active    

                                              [facility]                         

2011                              [facility]                    

 

                          Lopressor                         5 mg, 5 mL, Route: I

V, Drug form: INJ, Q4H, 

PRN Other -See Comment, Start date: 11 18:39:00, Duration: 30 day, Stop 
date: 11 18:38:00                         IV                         No 

Longer Active                                                   [facility]           

 

                          2011                         [facility]       

             

 

                          Lovenox                         60 mg, 0.6 mL, Route: 

SUB-Q, Drug form: INJ, 

[id number], Start date: 11 18:00:00, Duration: 30 day, Stop date: 11 
6:00:00                         SUB-Q                         No Longer Active  

                                                Km                         

2011                              [facility]                    

 

                          Lanoxin                         0.25 mg, 1 mL, Route: 

IV, Drug form: INJ, ONCE, 

Start date: 11 13:20:00, Stop date: 11 13:20:00                     
                    IV                         No Longer Active                 

                

                          [place]                         2011              

         

                                        [facility]                    

 

                          metoprolol                         50 mg, 1 tab, Route

: PO, Drug form: TAB, 

Q12H, Hold for SBP < 90mmHg or HR < 60. First dose now., Start date: 11 
9:00:00, Duration: 30 day, Stop date: 11 21:00:00                         

PO                         No Longer Active                                     

                    [place]                         2011                   

      

[facility]                    

 

                          Saline Flush 0.9%                         5 ml, Route:

 IVP, Drug Form: INJ, 

Q12H, Start date: 11 9:00:00, Duration: 30 day, Stop date: 11 
21:00:00                         IVP                         No Longer Active   

                                               [facility]                         

2011                              [facility]                    

 

                          famotidine                         20 mg, 1 tab, Route

: PO, Drug form: TAB, 

Q12H, Start date: 11 9:00:00, Duration: 30 day, Stop date: 11 
21:00:00                         PO                         No Longer Active    

                                              [facility]                         

2011                              [facility]                    

 

                          aspirin 325 mg tablet                         325 mg, 

1 tab, Route: PO, Drug 

form: TAB, Daily, Start date: 11 9:00:00, Duration: 30 day, Stop date: 
11 9:00:00                         PO                         No Longer 

Active                                                   [facility]                

 

                          2011                         [facility]       

             

 

                                        diltiazem 125 mg + Sodium Chloride 0.9% 

(titrate) 100 mL                        

                                        100 mL, Rate: Start at 5mg/hr. Titrate t

o maintain HR < 100., Route: IV, Total 

Volume: 125, Titrate to off after metoprolol started, Duration: 30 day, Stop 
date: 11 6:53:00, Replace Every: 24 hr                         IV         

                          No Longer Active                                      

          

                    [place]                         2011                   

      [facility]

                    

 

                          Saline Flush 0.9%                         5 ml, Route:

 IVP, Drug Form: INJ, PRN,

 PRN Line Flush, Start date: 11 6:53:00, Duration: 30 day, Stop date: 
11 6:52:00                         IVP                         No Longer 

Active                                                   [facility]                

 

                          2011                         [facility]       

             

 

                          Saline Flush 0.9%                         5 ml, Route:

 IVP, Drug Form: INJ, PRN,

 PRN Line Flush, Start date: 11 3:21:00, Duration: 30 day, Stop date: 
11 3:20:00                         IVP                         No Longer 

Active                                                   [facility]                    

 

                          2011                         [facility]       

             

 

                          nitroglycerin 0.4 mg sublingual tablet                

         0.4 mg, 1 tab, 

SL, Q5Min, PRN, as needed for chest pain, Substitution Allowed                  
                    SL                         Active                           

             

                                             2011                         

[facility]                    

 

                          Diovan  mg-25 mg oral tablet                   

      1 tab, PO, Daily, 30

 tab, Substitution Allowed, Maintenance, TAB                         PO         

                    Active                                                  [facility] 

    

                          2011                         [facility]       

      

       

 

                          metoprolol 50 mg oral tablet                         1

 tab, PO, BID, 180 tab, 

Substitution Allowed, TAB                         PO                         

Active                                                                          

 

                          2011                         [facility]       

             

 

                          gemfibrozil 600 mg oral tablet                        

 600 mg, 1 tab, PO, BID, 

Substitution Allowed                         PO                         Active  

                                                [facility]                         

2011                              [facility]                    

 

                          Fosamax                         PO, Daily, Substitutio

n Allowed                 

                    PO                         Active                           

            

                                             2011                         

[facility]                    

 

                          Prilosec 20 mg oral delayed release capsule           

              1 cap, PO, 

Daily, 30 cap, Substitution Allowed                         PO                  

                    Active                                                  [facility] 

             

                          2011                         [facility]       

             

 

                          magnesium sulfate                         2 gm, Route:

 IVPB, Drug form: INJ, 

ONCE, Total dose = 2 gm, Start date: 11 1:32:00, Duration: 1 doses or 
times, Stop date: 11 1:32:00                         IVPB                 

                    No Longer Active                                            

      

[place]                         2011                         [facility] 

                   

 

                          K-Dur 20                         40 mEq, Route: PO, Dr chase form: ERTAB, ONCE, 

Start date: 11 1:32:00, Stop date: 11 1:32:00                       
                    PO                         No Longer Active                 

                  

                          [place]                         2011            

           

                                        [facility]                    

 

                                        diltiazem 125 mg + Sodium Chloride 0.9% 

(titrate) 100 mL                        

                                        100 mL, Rate: Titrate, Route: IV, Total 

Volume: 125 ml, Duration: 30 day, Stop 

date: 11 0:37:00, Replace Every: 24 hr                         IV         

                          No Longer Active                                      

          

                    [place]                         2011                  

       [facility]                    

 

                          Cardizem                         15 mg, Route: IVP, Dr chase form: INJ, ONCE, 

Priority: STAT, Start date: 11 22:12:00, Stop date: 11 22:12:00     
                          IVP                         No Longer Active          

      

                                             [place]                         2011    

                                        [facility]                    

 

                          aspirin 325 mg tablet                         325 mg, 

1 tab, Route: PO, Drug 

form: TAB, ONCE, Priority: STAT, Start date: 11 22:06:00, Stop date: 
11 22:06:00                         PO                         No Longer 

Active                                                   [place]                

 

                          2011                         [facility]       

             

 

                          Saline Flush 0.9%                         5 ml, Route:

 IVP, Drug Form: INJ, PRN,

 PRN Line Flush, Start date: 11 22:06:00, Duration: 24 hr, Stop date: 
11 22:05:00                         IVP                         No Longer 

Active                                                   [place]                  

 

                          2011                         [facility]       

             

 

                          Alendronate Sodium 70 MG Oral Tablet                  

       ; Start Date: 

1900; End Date: 1900 (Active)                                       

                    Inactive                                                    

         

                          1900                         [facility]      

            

  

 

                          Omeprazole 20 MG Oral Capsule Delayed Release         

                ; Start 

Date: 1900; End Date: 1900 (Active)                                 

                    Inactive                                                    

   

                          1900                         [facility]      

      

        

 

                          Hydrochlorothiazide 12.5 MG Oral Capsule              

           ; Start Date: 

1900; End Date: 1900 (Active)                                       

                    Inactive                                                    

         

                          1900                         [facility]      

            

  

 

                          Nitrostat 0.4 MG Sublingual Tablet Sublingual         

                ; Start 

Date: 1900; End Date: 1900 (Active)                                 

                    Inactive                                                    

   

                          1900                         [facility]      

      

        

 

                          Flecainide Acetate 100 MG Oral Tablet                 

        ; Start Date: 

1900; End Date: 1900 (Active)                                       

                    Inactive                                                    

         

                          1900                         [facility]      

            

  

 

                                        Metoprolol Succinate ER 50 MG Oral Table

t Extended Release 24 Hour              

                                        ; Start Date: 1900; End Date: 1900 (Active)              

                                             Inactive                           

         

                                             1900                         

[facility]                    

 

                          Gemfibrozil 600 MG Oral Tablet                        

 ; Start Date: 1900;

 End Date: 1900 (Active)                                                  

 

Inactive                                                                        

 

                          1900                         [facility]      

              

 

                          AmLODIPine Besylate 5 MG Oral Tablet                  

       ; Start Date: 

1900; End Date: 1900 (Active)                                       

                    Inactive                                                    

         

                          1900                         [facility]      

            

  

 

                                        Metoprolol Succinate ER 50 MG Oral Table

t Extended Release 24 Hour              

                                        ; Start Date: 1900; End Date: 1900 (Active)              

                                             Inactive                           

         

                                             1900                         

[facility]                    

 

                          Lopid 600 MG Oral Tablet                          (Act

fabio)                      

                                             Active                             

                 

                                                                      [facility]

ans             

       

 

                          Hydrochlorothiazide 12.5 MG Oral Capsule              

            (Active)      

                                             Active                             

 

                                                                      [facility]                    



                                                                                
                                                                                
                                                                                
                                                                                
                                                                                
                                                                                
                                                                                
                                                                                
                                                                                
                                                                                
                                                                                
                                                                                
                                                                                
                                                                                
                                                                                
                                                                                
                                                                                
                                                                                
                                                                                
                                                                                
                                                                                
                                                                                
                                                                                
                                                                                
                                                                                
                                                                                
                                                                                
                                                                                
                                                                                
                                                                                
    



Allergies, Adverse Reactions, Alerts

                    



                    Substance                         Category                  

       Reaction     

                          Severity                         Reaction type        

      

                    Status                         Date Reported                

         

Comments                                Source                    

 

                          No Known Medication Allergies                         

Assertion                 

                                                                      Drug aller

gy          

                                                                                

     

                                        [facility]                    

 

                          No Known Drug Allergies                         drug a

llergy                    

                                                                      drug aller

gy             

                    Active                                                      

        

                                        [facility]                    



                                                                



Immunizations

                    



                    Immunization                         Date Given             

            Site    

                          Status                         Last Updated           

     

                          Comments                         Source               

     

 

                          influenza virus vaccine, inactivated                  

       2019         

                          Left deltoid                         completed        

          

                    [place]                                                   [place]

[facility]      

              



                                            



Results





                    Order Name                         Results                  

       Value        

                          Reference Range                         Date          

         

                    Interpretation                         Comments             

            

Source                    

 

                    IMMUNOLOGY                         Coronavirus (COVID-19) NA

A Not Detected 

*NA*

                    (10/30/20 8:15 PM)                         Not Detected     

                    

10/31/2020                                                                      

 

                                        [facility]                    

 

                    URINE AND STOOL                         UA Color            

Yellow 

*NA*

                    (10/30/20 6:56 PM)                         Yellow           

              

10/30/2020                                                                      

 

                                        [facility]                    

 

                    URINE AND STOOL                         UA Turbidity        

Clear 

                    (10/30/20 6:56 PM)                         Clear            

             

10/30/2020                                                                      

 

                                        [facility]                    

 

                    URINE AND STOOL                         UA Spec Grav        

1.015                      

                    <=1.030                         10/30/2020                  

                 

                                                    [facility]                

    

 

                URINE AND STOOL                         UA pH           6.0     

                    5.0 - 

8.0                         10/30/2020                                          

                                                    [facility]                

    

 

                    URINE AND STOOL                         UA Protein          

100 mg/dL                    

                    Negative mg/dL                         10/30/2020           

               

                                                    [facility]                

   

 

 

                    URINE AND STOOL                         UA Glucose          

Negative 

                    (10/30/20 6:56 PM)                         Negative         

                

10/30/2020                                                                      

 

                                        [facility]                    

 

                    URINE AND STOOL                         UA Ketones          

Negative 

*NA*

                    (10/30/20 6:56 PM)                         Negative         

                

10/30/2020                                                                      

 

                                        [facility]                    

 

                    URINE AND STOOL                         UA Bili             

Negative 

*NA*

                    (10/30/20 6:56 PM)                         Negative         

                

10/30/2020                                                                      

 

                                        [facility]                    

 

                    URINE AND STOOL                         UA Blood            

Small 

*ABN*

                    (10/30/20 6:56 PM)                         Negative         

                

10/30/2020                                                                      

 

                                        [facility]                    

 

                    URINE AND STOOL                         UA Urobilinogen     

0.2                     

                    0.1 - 1.0                         10/30/2020                

                

                                                    [facility]                

    

 

                    URINE AND STOOL                         UA Nitrite          

Negative 

                    (10/30/20 6:56 PM)                         Negative         

                

10/30/2020                                                                      

 

                                        [facility]                    

 

                    URINE AND STOOL                         UA Leuk Est         

Trace 

*ABN*

                    (10/30/20 6:56 PM)                         Negative         

                

10/30/2020                                                                      

 

                                        [facility]                    

 

                    URINE AND STOOL                         UA Sq Epi           

Few /LPF                      

                    Few /LPF                         10/30/2020                 

                 

                                                    [facility]                

    

 

                URINE AND STOOL                         UA WBC          0-2 /HPF

                         

0 - 5                         10/30/2020                                        

                                                    [facility]                

    

 

                    URINE AND STOOL                         UA RBC              

None Seen 

                    (10/30/20 6:56 PM)                         0 - 2            

             

10/30/2020                                                                      

 

                                        [facility]                    

 

                    URINE AND STOOL                         UA Bacteria         

Occasional /HPF             

                          None Seen /HPF                         10/30/2020     

              

                                                                      [facility]

st            

        

 

                CARDIAC ENZYMES                         Total CK        34      

                   12 -

 191                         10/30/2020                                         

                                                    [facility]                

    

 

                    CARDIAC ENZYMES                         Troponin-I          

<0.02                        

                    0.00 - 0.40                         10/30/2020              

                   

                                                     [facility]                

    

 

                CHEM PANEL                         Glucose Lvl     117          

               70 - 

99                         10/30/2020                                           

                                                     [facility]                

    

 

                CHEM PANEL                         BUN             19           

              7 - 22        

                    10/30/2020                                                  

   

                                        [facility]                    

 

                CHEM PANEL                         Creatinine Lvl  0.98         

                

0.50 - 1.40                         10/30/2020                                  

                                                     [facility]                

    

 

                CHEM PANEL                         Sodium Lvl      137          

               135 - 

145                         10/30/2020                                          

                                                     [facility]                

    

 

                CHEM PANEL                         Potassium Lvl   4.1          

               3.5

 - 5.1                         10/30/2020                                       

                                                     [facility]                

    

 

                CHEM PANEL                         Chloride Lvl    101          

               95 -

 109                         10/30/2020                                         

                                                     [facility]                

    

 

                CHEM PANEL                         CO2             25           

              24 - 32       

                    10/30/2020                                                  

  

                                         [facility]                    

 

                CHEM PANEL                         Calcium Lvl     8.9          

               8.5 -

 10.5                         10/30/2020                                        

                                                     [facility]                

    

 

                CHEM PANEL                         Total Protein   7.0          

               6.4

 - 8.4                         10/30/2020                                       

                                                     [facility]                

    

 

                CHEM PANEL                         Albumin Lvl     3.3          

               3.5 -

 5.0                         10/30/2020                                         

                                                     [facility]                

    

 

                CHEM PANEL                         ALT             32           

              0 - 65        

                    10/30/2020                                                  

   

                                         [facility]                    

 

                CHEM PANEL                         AST             21           

              0 - 37        

                    10/30/2020                                                  

   

                                         [facility]                    

 

                CHEM PANEL                         Alk Phos        76           

              39 - 136 

                          10/30/2020                                            

  

                                                     [facility]                

    

 

                CHEM PANEL                         Bili Total      0.5          

               0.2 - 

1.3                         10/30/2020                                          

                                                     [facility]                

    

 

                CHEM PANEL                         AGAP            15.1         

                10.0 - 20.0

                          10/30/2020                                            

 

                                                     [facility]                

    

 

                CHEM PANEL                         B/C Ratio       19           

              6 - 25  

                          10/30/2020                                            

   

                                                     [facility]                

    

 

                CHEM PANEL                         Globulin        3.7          

               2.7 - 

4.2                         10/30/2020                                          

                                                     [facility]                

    

 

                CHEM PANEL                         A/G Ratio       0.9          

               0.7 - 

1.6                         10/30/2020                                          

                                                     [facility]                

    

 

                CHEM PANEL                         eGFR            52           

                           

                    10/30/2020                                                  

Result 

Comment: The eGFR is calculated using the CKD-EPI formula. In most young, 
healthy individuals the eGFR will be >90 mL/min/1.73m2. The eGFR declines with 
age. An eGFR of 60-89 may be normal in some populations, particularly the 
elderly, for whom the CKD-EPI formula has not been extensively validated. Use of
 the eGFR is not recommended in the following populations:<br/><br/>Individuals 
with unstable creatinine concentrations, including pregnant patients and those 
with serious co-morbid conditions.<br/><br/>Patients with extremes in muscle 
mass or diet. <br/><br/>The data above are obtained from the National Kidney 
Disease Education Program (NKDEP) which additionally recommends that when the 
eGFR is used in patients with extremes of body mass index for purposes of drug 
dosing, the eGFR should be multiplied by the estimated BMI.                     
                                        [facility]                    

 

                CHEM PANEL                         Magnesium Lvl   1.7          

               1.8

 - 2.4                         10/30/2020                                       

                                                    [facility]                

    

 

                CHEM PANEL                         Phosphorus      3.2          

               2.5 - 

4.5                         10/30/2020                                          

                                                    [facility]                

    

 

                HEMATOLOGY                         WBC             10.9         

                3.7 - 10.4  

                          10/30/2020                                            

   

                                                    [facility]                

    

 

                HEMATOLOGY                         RBC             3.61         

                4.20 - 5.40 

                          10/30/2020                                            

  

                                                    [facility]                

    

 

                HEMATOLOGY                         Hgb             11.2         

                12.0 - 16.0 

                          10/30/2020                                            

  

                                                    [facility]                

    

 

                HEMATOLOGY                         Hct             33.3         

                36.0 - 48.0 

                          10/30/2020                                            

  

                                                    [facility]                         MCV             92.1         

                80.0 - 98.0 

                          10/30/2020                                            

  

                                                    [facility]                         MCH             31.0         

                27.0 - 31.0 

                          10/30/2020                                            

  

                                                    [facility]                         MCHC            33.6         

                32.0 - 36.0

                          10/30/2020                                            

 

                                                    [facility]                         RDW             14.0         

                11.5 - 14.5 

                          10/30/2020                                            

  

                                                    [facility]                         Platelet        192          

               133 - 

450                         10/30/2020                                          

                                                    [facility]                

    

 

                HEMATOLOGY                         MPV             9.6          

               7.4 - 10.4   

                          10/30/2020                                            

    

                                                    [facility]                

    

 

                HEMATOLOGY                         PT              14.4         

                12.0 - 14.7  

                          10/30/2020                                            

   

                                                    [facility]                

    

 

                HEMATOLOGY                         INR             1.11         

                0.85 - 1.17 

                          10/30/2020                                            

  

                                                    [facility]                

    

 

                HEMATOLOGY                         PTT             24.7         

                22.9 - 35.8 

                          10/30/2020                                            

  

                                                    [facility]                

    

 

                HEMATOLOGY                         Segs            76.1         

                45.0 - 75.0

                          10/30/2020                                            

 

                                                    [facility]                

    

 

                HEMATOLOGY                         Lymphocytes     10.6         

                20.0

 - 40.0                         10/30/2020                                      

                                                    [facility]                

    

 

                HEMATOLOGY                         Monocytes       9.6          

               2.0 - 

12.0                         10/30/2020                                         

                                                    [facility]                

    

 

                HEMATOLOGY                         Eosinophils     3.3          

               0.0 -

 4.0                         10/30/2020                                         

                                                    [facility]                

    

 

                HEMATOLOGY                         Basophils       0.4          

               0.0 - 

1.0                         10/30/2020                                          

                                                    [facility]                

    

 

                HEMATOLOGY                         Neutrophils #   8.3          

               1.5

 - 8.1                         10/30/2020                                       

                                                    [facility]                

    

 

                HEMATOLOGY                         Lymphocytes #   1.1          

               1.0

 - 5.5                         10/30/2020                                       

                                                    [facility]                

    

 

                HEMATOLOGY                         Monocytes #     1.0          

               0.0 -

 0.8                         10/30/2020                                         

                                                    [facility]                

    

 

                HEMATOLOGY                         Eosinophils #   0.4          

               0.0

 - 0.5                         10/30/2020                                       

                                                     [facility]                

    

 

                CHEM PANEL                         Glucose Lvl     116          

               70 - 

99                         10/16/2020                                           

                                                     [facility]                

    

 

                CHEM PANEL                         BUN             20           

              7 - 22        

                    10/16/2020                                                  

   

                                         [facility]                    

 

                CHEM PANEL                         Creatinine Lvl  1.14         

                

0.50 - 1.40                         10/16/2020                                  

                                                     [facility]                

    

 

                CHEM PANEL                         Sodium Lvl      141          

               135 - 

145                         10/16/2020                                          

                                                    [facility]                

    

 

                CHEM PANEL                         Potassium Lvl   4.1          

               3.5

 - 5.1                         10/16/2020                                       

                                                     [facility]                

    

 

                CHEM PANEL                         Chloride Lvl    108          

               95 -

 109                         10/16/2020                                         

                                                     [facility]                

    

 

                CHEM PANEL                         CO2             27           

              24 - 32       

                    10/16/2020                                                  

  

                                         [facility]                    

 

                CHEM PANEL                         Calcium Lvl     9.6          

               8.5 -

 10.5                         10/16/2020                                        

                                                     [facility]                

    

 

                CHEM PANEL                         AGAP            10.1         

                10.0 - 20.0

                          10/16/2020                                            

 

                                                     [facility]                

    

 

                CHEM PANEL                         eGFR            43           

                           

                    10/16/2020                                                  

Result 

Comment: The eGFR is calculated using the CKD-EPI formula. In most young, 
healthy individuals the eGFR will be >90 mL/min/1.73m2. The eGFR declines with 
age. An eGFR of 60-89 may be normal in some populations, particularly the 
elderly, for whom the CKD-EPI formula has not been extensively validated. Use of
 the eGFR is not recommended in the following populations:<br/><br/>Individuals 
with unstable creatinine concentrations, including pregnant patients and those 
with serious co-morbid conditions.<br/><br/>Patients with extremes in muscle 
mass or diet. <br/><br/>The data above are obtained from the National Kidney 
Disease Education Program (NKDEP) which additionally recommends that when the 
eGFR is used in patients with extremes of body mass index for purposes of drug 
dosing, the eGFR should be multiplied by the estimated BMI.                     
                                        [facility]                    

 

                HEMATOLOGY                         WBC             8.2          

               3.7 - 10.4   

                          10/16/2020                                            

    

                                                    [facility] [facility]                

    

 

                HEMATOLOGY                         RBC             3.71         

                4.20 - 5.40 

                          10/16/2020                                            

  

                                                    [facility]                

    

 

                HEMATOLOGY                         Hgb             11.4         

                12.0 - 16.0 

                          10/16/2020                                            

  

                                                     [facility]                

    

 

                HEMATOLOGY                         Hct             33.6         

                36.0 - 48.0 

                          10/16/2020                                            

  

                                                     [facility]                

    

 

                HEMATOLOGY                         MCV             90.5         

                80.0 - 98.0 

                          10/16/2020                                            

  

                                                     [facility]                

    

 

                HEMATOLOGY                         MCH             30.6         

                27.0 - 31.0 

                          10/16/2020                                            

  

                                                     [facility]                

    

 

                HEMATOLOGY                         MCHC            33.9         

                32.0 - 36.0

                          10/16/2020                                            

 

                                                     [facility]                

    

 

                HEMATOLOGY                         RDW             13.5         

                11.5 - 14.5 

                          10/16/2020                                            

  

                                                     [facility]                

    

 

                HEMATOLOGY                         Platelet        257          

               133 - 

450                         10/16/2020                                          

                                                     [facility]                

    

 

                HEMATOLOGY                         MPV             9.3          

               7.4 - 10.4   

                          10/16/2020                                            

    

                                                     [facility]                

    

 

                HEMATOLOGY                         PT              14.7         

                12.0 - 14.7  

                          10/16/2020                                            

   

                                                     [facility]                

    

 

                HEMATOLOGY                         INR             1.14         

                0.85 - 1.17 

                          10/16/2020                                            

  

                                                     [facility]                

    

 

                HEMATOLOGY                         PTT             23.0         

                22.9 - 35.8 

                          10/16/2020                                            

  

                                                     [facility]                

    

 

                HEMATOLOGY                         Segs            68.1         

                45.0 - 75.0

                          10/16/2020                                            

 

                                                     [facility]                

    

 

                HEMATOLOGY                         Lymphocytes     23.8         

                20.0

 - 40.0                         10/16/2020                                      

                                                     [facility]                

    

 

                HEMATOLOGY                         Monocytes       6.2          

               2.0 - 

12.0                         10/16/2020                                         

                                                     [facility]                

    

 

                HEMATOLOGY                         Eosinophils     1.3          

               0.0 -

 4.0                         10/16/2020                                         

                                                     [facility]                

    

 

                HEMATOLOGY                         Basophils       0.6          

               0.0 - 

1.0                         10/16/2020                                          

                                                     [facility]                

    

 

                HEMATOLOGY                         Neutrophils #   5.6          

               1.5

 - 8.1                         10/16/2020                                       

                                                     [facility]                

    

 

                HEMATOLOGY                         Lymphocytes #   2.0          

               1.0

 - 5.5                         10/16/2020                                       

                                                     [facility]                

    

 

                HEMATOLOGY                         Monocytes #     0.5          

               0.0 -

 0.8                         10/16/2020                                         

                                                     [facility]                

    

 

                HEMATOLOGY                         Eosinophils #   0.1          

               0.0

 - 0.5                         10/16/2020                                       

                                                     [facility]                

    

 

                HEMATOLOGY                         Basophils #     0.1          

               0.0 -

 0.2                         10/16/2020                                         

                                                     [facility]                

    

 

                    IMMUNOLOGY                         Coronavirus (COVID-19) NA

A Not Detected 

*NA*

                    (10/13/20 9:54 AM)                         Not Detected     

                    

10/13/2020                                                                      

 

                                         [facility]                    

 

                CHEM PANEL                         Glucose Lvl     113          

               70 - 

99                         10/02/2020                                           

                                                     [facility]                

    

 

                CHEM PANEL                         BUN             22           

              7 - 22        

                    10/02/2020                                                  

   

                                        [facility]                    

 

                CHEM PANEL                         Creatinine Lvl  1.24         

                

0.50 - 1.40                         10/02/2020                                  

                                                     [facility]                

    

 

                CHEM PANEL                         Sodium Lvl      140          

               135 - 

145                         10/02/2020                                          

                                                    [facility]                

    

 

                CHEM PANEL                         Potassium Lvl   3.9          

               3.5

 - 5.1                         10/02/2020                                       

                                                    [facility]                

    

 

                CHEM PANEL                         Chloride Lvl    106          

               95 -

 109                         10/02/2020                                         

                                                    [facility]                

    

 

                CHEM PANEL                         CO2             27           

              24 - 32       

                    10/02/2020                                                  

  

                                        [facility]                    

 

                CHEM PANEL                         Calcium Lvl     9.9          

               8.5 -

 10.5                         10/02/2020                                        

                                                    [facility]                

    

 

                CHEM PANEL                         AGAP            10.9         

                10.0 - 20.0

                          10/02/2020                                            

 

                                                    [facility]                

    

 

                CHEM PANEL                         eGFR            39           

                           

                    10/02/2020                                                  

Result 

Comment: The eGFR is calculated using the CKD-EPI formula. In most young, 
healthy individuals the eGFR will be >90 mL/min/1.73m2. The eGFR declines with 
age. An eGFR of 60-89 may be normal in some populations, particularly the 
elderly, for whom the CKD-EPI formula has not been extensively validated. Use of
 the eGFR is not recommended in the following populations:<br/><br/>Individuals 
with unstable creatinine concentrations, including pregnant patients and those 
with serious co-morbid conditions.<br/><br/>Patients with extremes in muscle 
mass or diet. <br/><br/>The data above are obtained from the National Kidney 
Disease Education Program (NKDEP) which additionally recommends that when the 
eGFR is used in patients with extremes of body mass index for purposes of drug 
dosing, the eGFR should be multiplied by the estimated BMI.                     
                                        [facility]                    

 

                HEMATOLOGY                         WBC             9.9          

               3.7 - 10.4   

                          10/02/2020                                            

    

                                                    [facility]                

    

 

                HEMATOLOGY                         RBC             3.93         

                4.20 - 5.40 

                          10/02/2020                                            

  

                                                    [facility]                

    

 

                HEMATOLOGY                         Hgb             12.2         

                12.0 - 16.0 

                          10/02/2020                                            

  

                                                    [facility]                

    

 

                HEMATOLOGY                         Hct             35.5         

                36.0 - 48.0 

                          10/02/2020                                            

  

                                                    [facility]                

    

 

                HEMATOLOGY                         MCV             90.4         

                80.0 - 98.0 

                          10/02/2020                                            

  

                                                    [facility]                

    

 

                HEMATOLOGY                         MCH             30.9         

                27.0 - 31.0 

                          10/02/2020                                            

  

                                                    [facility]                

    

 

                HEMATOLOGY                         MCHC            34.2         

                32.0 - 36.0

                          10/02/2020                                            

 

                                                    [facility]                

    

 

                HEMATOLOGY                         RDW             13.7         

                11.5 - 14.5 

                          10/02/2020                                            

  

                                                    [facility]                

    

 

                HEMATOLOGY                         Platelet        235          

               133 - 

450                         10/02/2020                                          

                                                    [facility]                         MPV             9.1          

               7.4 - 10.4   

                          10/02/2020                                            

    

                                                    [facility]                         PT              15.6         

                12.0 - 14.7  

                          10/02/2020                                            

   

                                                    [facility]                

    

 

                HEMATOLOGY                         INR             1.23         

                0.85 - 1.17 

                          10/02/2020                                            

  

                                                     [facility]                

    

 

                HEMATOLOGY                         PTT             24.4         

                22.9 - 35.8 

                          10/02/2020                                            

  

                                                     [facility]                

    

 

                HEMATOLOGY                         Segs            63.9         

                45.0 - 75.0

                          10/02/2020                                            

 

                                                     [facility]                

    

 

                HEMATOLOGY                         Lymphocytes     26.1         

                20.0

 - 40.0                         10/02/2020                                      

                                                     [facility]                

    

 

                HEMATOLOGY                         Monocytes       7.6          

               2.0 - 

12.0                         10/02/2020                                         

                                                     [facility]                

    

 

                HEMATOLOGY                         Eosinophils     1.8          

               0.0 -

 4.0                         10/02/2020                                         

                                                     [facility]                

    

 

                HEMATOLOGY                         Basophils       0.6          

               0.0 - 

1.0                         10/02/2020                                          

                                                     [facility]                

    

 

                HEMATOLOGY                         Neutrophils #   6.3          

               1.5

 - 8.1                         10/02/2020                                       

                                                    [facility]                

    

 

                HEMATOLOGY                         Lymphocytes #   2.6          

               1.0

 - 5.5                         10/02/2020                                       

                                                     [facility]                

    

 

                HEMATOLOGY                         Monocytes #     0.8          

               0.0 -

 0.8                         10/02/2020                                         

                                                     [facility]                

    

 

                HEMATOLOGY                         Eosinophils #   0.2          

               0.0

 - 0.5                         10/02/2020                                       

                                                     [facility]                

    

 

                HEMATOLOGY                         Basophils #     0.1          

               0.0 -

 0.2                         10/02/2020                                         

                                                    [facility]                

    

 

                    IMMUNOLOGY                         Coronavirus (COVID-19) NA

A Not Detected 

*NA*

                    (20 9:30 AM)                         Not Detected      

                   

2020                                                                      

 

                                        [facility]                    

 

                HEMATOLOGY                         POC INR         3.3          

               0.9 - 1.2

                          2020                                            

 

                                                     [facility]                

    

 

                HEMATOLOGY                         POC PT          37.4         

                12.0 - 

14.7                         2020                                         

                                                     [facility]                

    

 

                HEMATOLOGY                         POC INR         2.7          

               0.9 - 1.2

                          2020                                            

 

                                                    [facility]                

    

 

                HEMATOLOGY                         POC PT          30.9         

                12.0 - 

14.7                         2020                                         

                                                    [facility]                

    

 

                    CARDIAC ENZYMES                         Troponin-I          

<0.02                        

                    0.00 - 0.40                         2020              

                   

                                                    [facility]                

    

 

                CHEM PANEL                         Glucose Lvl     94           

              70 - 

99                         2020                                           

                                                    [facility]                

    

 

                CHEM PANEL                         BUN             15           

              7 - 22        

                    2020                                                  

   

                                        [facility]                    

 

                CHEM PANEL                         Creatinine Lvl  0.84         

                

0.50 - 1.40                         2020                                  

                                                     [facility]                

    

 

                CHEM PANEL                         Sodium Lvl      141          

               135 - 

145                         2020                                          

                                                     [facility]                

    

 

                CHEM PANEL                         Potassium Lvl   4.2          

               3.5

 - 5.1                         2020                                       

                                                     [facility]                

    

 

                CHEM PANEL                         Chloride Lvl    108          

               95 -

 109                         2020                                         

                                                     [facility]                

    

 

                CHEM PANEL                         CO2             27           

              24 - 32       

                    2020                                                  

  

                                         [facility]                    

 

                CHEM PANEL                         Calcium Lvl     9.0          

               8.5 -

 10.5                         2020                                        

                                                    [facility]                

    

 

                CHEM PANEL                         Total Protein   6.6          

               6.4

 - 8.4                         2020                                       

                                                    [facility]                

    

 

                CHEM PANEL                         Albumin Lvl     3.3          

               3.5 -

 5.0                         2020                                         

                                                    [facility]                

    

 

                CHEM PANEL                         ALT             22           

              0 - 65        

                    2020                                                  

   

                                        [facility]                    

 

                CHEM PANEL                         AST             21           

              0 - 37        

                    2020                                                  

   

                                         [facility]                    

 

                CHEM PANEL                         Alk Phos        52           

              39 - 136 

                          2020                                            

  

                                                    [facility]                

    

 

                CHEM PANEL                         Bili Total      0.7          

               0.2 - 

1.3                         2020                                          

                                                    [facility]                

    

 

                CHEM PANEL                         AGAP            10.2         

                10.0 - 20.0

                          2020                                            

 

                                                    [facility]                

    

 

                CHEM PANEL                         B/C Ratio       18           

              6 - 25  

                          2020                                            

   

                                                    [facility]                

    

 

                CHEM PANEL                         Globulin        3.3          

               2.7 - 

4.2                         2020                                          

                                                    [facility]                

    

 

                CHEM PANEL                         A/G Ratio       1.0          

               0.7 - 

1.6                         2020                                          

                                                    [facility]                

    

 

                CHEM PANEL                         eGFR            63           

                           

                    2020                                                  

Result 

Comment: The eGFR is calculated using the CKD-EPI formula. In most young, 
healthy individuals the eGFR will be >90 mL/min/1.73m2. The eGFR declines with 
age. An eGFR of 60-89 may be normal in some populations, particularly the 
elderly, for whom the CKD-EPI formula has not been extensively validated. Use of
 the eGFR is not recommended in the following populations:<br/><br/>Individuals 
with unstable creatinine concentrations, including pregnant patients and those 
with serious co-morbid conditions.<br/><br/>Patients with extremes in muscle 
mass or diet. <br/><br/>The data above are obtained from the National Kidney 
Disease Education Program (NKDEP) which additionally recommends that when the 
eGFR is used in patients with extremes of body mass index for purposes of drug 
dosing, the eGFR should be multiplied by the estimated BMI.                     
                                        [facility]                    

 

                HEMATOLOGY                         WBC             9.2          

               3.7 - 10.4   

                          2020                                            

    

                                                    [facility]                

    

 

                HEMATOLOGY                         RBC             3.43         

                4.20 - 5.40 

                          2020                                            

  

                                                    [facility]                

    

 

                HEMATOLOGY                         Hgb             10.7         

                12.0 - 16.0 

                          2020                                            

  

                                                    [facility]                

    

 

                HEMATOLOGY                         Hct             30.8         

                36.0 - 48.0 

                          2020                                            

  

                                                    [facility]                

    

 

                HEMATOLOGY                         MCV             89.9         

                80.0 - 98.0 

                          2020                                            

  

                                                    [facility]                

    

 

                HEMATOLOGY                         MCH             31.3         

                27.0 - 31.0 

                          2020                                            

  

                                                     [facility]                

    

 

                HEMATOLOGY                         MCHC            34.8         

                32.0 - 36.0

                          2020                                            

 

                                                     [facility]                

    

 

                HEMATOLOGY                         RDW             13.9         

                11.5 - 14.5 

                          2020                                            

  

                                                     [facility]                

    

 

                HEMATOLOGY                         Platelet        203          

               133 - 

450                         2020                                          

                                                     [facility]                

    

 

                HEMATOLOGY                         MPV             9.3          

               7.4 - 10.4   

                          2020                                            

    

                                                     [facility]                

    

 

                HEMATOLOGY                         PTT             30.5         

                22.9 - 35.8 

                          2020                                            

  

                                                     [facility]                

    

 

                HEMATOLOGY                         PT              22.0         

                12.0 - 14.7  

                          2020                                            

   

                                                     [facility]                

    

 

                HEMATOLOGY                         INR             1.89         

                0.85 - 1.17 

                          2020                                            

  

                                                     [facility]                

    

 

                HEMATOLOGY                         Segs            69.8         

                45.0 - 75.0

                          2020                                            

 

                                                     [facility]                

    

 

                HEMATOLOGY                         Lymphocytes     19.9         

                20.0

 - 40.0                         2020                                      

                                                     [facility]                

    

 

                HEMATOLOGY                         Monocytes       7.9          

               2.0 - 

12.0                         2020                                         

                                                     [facility]                

    

 

                HEMATOLOGY                         Eosinophils     1.9          

               0.0 -

 4.0                         2020                                         

                                                     [facility]                

    

 

                HEMATOLOGY                         Basophils       0.5          

               0.0 - 

1.0                         2020                                          

                                                     [facility]                

    

 

                HEMATOLOGY                         Neutrophils #   6.4          

               1.5

 - 8.1                         2020                                       

                                                     [facility]                

    

 

                HEMATOLOGY                         Lymphocytes #   1.8          

               1.0

 - 5.5                         2020                                       

                                                     [facility]                

    

 

                HEMATOLOGY                         Monocytes #     0.7          

               0.0 -

 0.8                         2020                                         

                                                     [facility]                

    

 

                HEMATOLOGY                         Eosinophils #   0.2          

               0.0

 - 0.5                         2020                                       

                                                    [facility]                

    

 

                    CARDIAC ENZYMES                         Troponin-I          

<0.02                        

                    0.00 - 0.40                         2020              

                   

                                                    [facility]                

    

 

                CARDIAC ENZYMES                         Total CK        95      

                   12 -

 191                         2020                                         

                                                     [facility]                

    

 

                    CARDIAC ENZYMES                         Troponin-I          

<0.02                        

                    0.00 - 0.40                         2020              

                   

                                                    [facility]                

    

 

                CARDIAC ENZYMES                         BNP             60      

                   <=100 

pg/mL                         2020                                        

                                                     [facility]                

    

 

                CHEM PANEL                         Glucose Lvl     96           

              70 - 

99                         2020                                           

                                                     [facility]                

    

 

                CHEM PANEL                         BUN             18           

              7 - 22        

                    2020                                                  

   

                                         [facility]                    

 

                CHEM PANEL                         Creatinine Lvl  1.04         

                

0.50 - 1.40                         2020                                  

                                                     [facility]                

    

 

                CHEM PANEL                         Sodium Lvl      137          

               135 - 

145                         2020                                          

                                                     [facility]                

    

 

                CHEM PANEL                         Potassium Lvl   4.3          

               3.5

 - 5.1                         2020                                       

                                                     [facility]                

    

 

                CHEM PANEL                         Chloride Lvl    108          

               95 -

 109                         2020                                         

                                                    [facility]                

    

 

                CHEM PANEL                         CO2             22           

              24 - 32       

                    2020                                                  

  

                                        [facility]                    

 

                CHEM PANEL                         Calcium Lvl     9.4          

               8.5 -

 10.5                         2020                                        

                                                    [facility]                

    

 

                CHEM PANEL                         Total Protein   7.7          

               6.4

 - 8.4                         2020                                       

                                                    [facility]                

    

 

                CHEM PANEL                         Albumin Lvl     3.8          

               3.5 -

 5.0                         2020                                         

                                                    [facility]                

    

 

                CHEM PANEL                         ALT             22           

              0 - 65        

                    2020                                                  

   

                                        [facility]                    

 

                CHEM PANEL                         AST             24           

              0 - 37        

                    2020                                                  

   

                                        [facility]                    

 

                CHEM PANEL                         Alk Phos        58           

              39 - 136 

                          2020                                            

  

                                                    [facility]                

    

 

                CHEM PANEL                         Bili Total      0.5          

               0.2 - 

1.3                         2020                                          

                                                    [facility]                

    

 

                CHEM PANEL                         AGAP            11.3         

                10.0 - 20.0

                          2020                                            

 

                                                    [facility]                

    

 

                CHEM PANEL                         B/C Ratio       17           

              6 - 25  

                          2020                                            

   

                                                     [facility]                

    

 

                CHEM PANEL                         Globulin        3.9          

               2.7 - 

4.2                         2020                                          

                                                    [facility]                

    

 

                CHEM PANEL                         A/G Ratio       1.0          

               0.7 - 

1.6                         2020                                          

                                                    [facility]                

    

 

                CHEM PANEL                         eGFR            48           

                           

                    2020                                                  

Result 

Comment: The eGFR is calculated using the CKD-EPI formula. In most young, 
healthy individuals the eGFR will be >90 mL/min/1.73m2. The eGFR declines with 
age. An eGFR of 60-89 may be normal in some populations, particularly the 
elderly, for whom the CKD-EPI formula has not been extensively validated. Use of
 the eGFR is not recommended in the following populations:<br/><br/>Individuals 
with unstable creatinine concentrations, including pregnant patients and those 
with serious co-morbid conditions.<br/><br/>Patients with extremes in muscle 
mass or diet. <br/><br/>The data above are obtained from the National Kidney 
Disease Education Program (NKDEP) which additionally recommends that when the 
eGFR is used in patients with extremes of body mass index for purposes of drug 
dosing, the eGFR should be multiplied by the estimated BMI.                     
                                        [facility]                    

 

                CHEM PANEL                         Magnesium Lvl   2.2          

               1.8

 - 2.4                         2020                                       

                                                    [facility]                

    

 

                HEMATOLOGY                         WBC             11.0         

                3.7 - 10.4  

                          2020                                            

   

                                                    [facility]                

    

 

                HEMATOLOGY                         RBC             3.86         

                4.20 - 5.40 

                          2020                                            

  

                                                    [facility]                

    

 

                HEMATOLOGY                         Hgb             11.9         

                12.0 - 16.0 

                          2020                                            

  

                                                     [facility]                

    

 

                HEMATOLOGY                         Hct             35.1         

                36.0 - 48.0 

                          2020                                            

  

                                                     [facility]                

    

 

                HEMATOLOGY                         MCV             90.9         

                80.0 - 98.0 

                          2020                                            

  

                                                     [facility]                

    

 

                HEMATOLOGY                         MCH             30.9         

                27.0 - 31.0 

                          2020                                            

  

                                                     [facility]                

    

 

                HEMATOLOGY                         MCHC            34.0         

                32.0 - 36.0

                          2020                                            

 

                                                     [facility]                

    

 

                HEMATOLOGY                         RDW             14.2         

                11.5 - 14.5 

                          2020                                            

  

                                                     [facility]                

    

 

                HEMATOLOGY                         Platelet        237          

               133 - 

450                         2020                                          

                                                     [facility]                

    

 

                HEMATOLOGY                         MPV             9.8          

               7.4 - 10.4   

                          2020                                            

    

                                                     [facility]                

    

 

                HEMATOLOGY                         PT              21.2         

                12.0 - 14.7  

                          2020                                            

   

                                                     [facility]                

    

 

                HEMATOLOGY                         INR             1.81         

                0.85 - 1.17 

                          2020                                            

  

                                                     [facility]                

    

 

                HEMATOLOGY                         PTT             26.2         

                22.9 - 35.8 

                          2020                                            

  

                                                     [facility]                

    

 

                HEMATOLOGY                         Segs            69.2         

                45.0 - 75.0

                          2020                                            

 

                                                     [facility]                

    

 

                HEMATOLOGY                         Lymphocytes     22.3         

                20.0

 - 40.0                         2020                                      

                                                     [facility]                

    

 

                HEMATOLOGY                         Monocytes       7.2          

               2.0 - 

12.0                         2020                                         

                                                     [facility]                

    

 

                HEMATOLOGY                         Eosinophils     0.9          

               0.0 -

 4.0                         2020                                         

                                                     [facility]                

    

 

                HEMATOLOGY                         Basophils       0.4          

               0.0 - 

1.0                         2020                                          

                                                     [facility]                

    

 

                HEMATOLOGY                         Neutrophils #   7.6          

               1.5

 - 8.1                         2020                                       

                                                     [facility]                

    

 

                HEMATOLOGY                         Lymphocytes #   2.5          

               1.0

 - 5.5                         2020                                       

                                                     [facility]                

    

 

                HEMATOLOGY                         Monocytes #     0.8          

               0.0 -

 0.8                         2020                                         

                                                     [facility]                

    

 

                HEMATOLOGY                         Eosinophils #   0.1          

               0.0

 - 0.5                         2020                                       

                                                     [facility]                

    

 

                HEMATOLOGY                         PT              20.8         

                12.0 - 14.7  

                          2020                                            

   

                                                     [facility]                

    

 

                HEMATOLOGY                         INR             1.77         

                0.85 - 1.17 

                          2020                                            

  

                                                     [facility]                

    

 

                LIPIDS                         Trig            119              

           <=149 mg/dL     

                    2020                                                  

                                         [facility]                    

 

                LIPIDS                         Chol            125              

           <=199 mg/dL     

                    2020                                                  

                                         [facility]                    

 

                LIPIDS                         HDL             58               

          >=61 mg/dL        

                    2020                                                  

   

                                         [facility]                    

 

                LIPIDS                         CHD Risk        2.16             

            3.90 - 5.80

                          2020                                            

 

                                                     [facility]                

    

 

                LIPIDS                         LDL (Calculated) 43              

           <=99 

mg/dL                         2020                                        

                                                     [facility]                

    

 

                LIPIDS                         VLDL            24               

                           

                    2020                                                  

            

                                        [facility]                    

 

                CHEM PANEL                         Creatinine Lvl  0.87         

                

0.50 - 1.40                         05/15/2020                                  

                                                    [facility]                

    

 

                CHEM PANEL                         eGFR            60           

                           

                    05/15/2020                                                  

Result 

Comment: The eGFR is calculated using the CKD-EPI formula. In most young, 
healthy individuals the eGFR will be >90 mL/min/1.73m2. The eGFR declines with 
age. An eGFR of 60-89 may be normal in some populations, particularly the 
elderly, for whom the CKD-EPI formula has not been extensively validated. Use of
 the eGFR is not recommended in the following populations:<br/><br/>Individuals 
with unstable creatinine concentrations, including pregnant patients and those 
with serious co-morbid conditions.<br/><br/>Patients with extremes in muscle 
mass or diet. <br/><br/>The data above are obtained from the National Kidney 
Disease Education Program (NKDEP) which additionally recommends that when the 
eGFR is used in patients with extremes of body mass index for purposes of drug 
dosing, the eGFR should be multiplied by the estimated BMI.                     
                                        [facility]                    

 

                HEMATOLOGY                         PT              20.4         

                12.0 - 14.7  

                          05/15/2020                                            

   

                                                    [facility]                

    

 

                HEMATOLOGY                         INR             1.72         

                0.85 - 1.17 

                          05/15/2020                                            

  

                                                    [facility]                

    

 

                HEMATOLOGY                         Hgb             11.9         

                12.0 - 16.0 

                          05/15/2020                                            

  

                                                    [facility]                

    

 

                    URINE AND STOOL                         UA Turbidity        

Clear 

                    (5/15/20 8:23 AM)                         Clear             

            

05/15/2020                                                                      

 

                                        [facility]                    

 

                    URINE AND STOOL                         UA Spec Grav        

1.005                      

                    <=1.030                         05/15/2020                  

                 

                                                    [facility]                

    

 

                URINE AND STOOL                         UA pH           8.0     

                    5.0 - 

8.0                         05/15/2020                                          

                                                    [facility]                

    

 

                    URINE AND STOOL                         UA Protein          

Negative mg/dL               

                          Negative mg/dL                         05/15/2020     

                

                                                                      [facility]

st              

      

 

                    URINE AND STOOL                         UA Glucose          

Negative mg/dL               

                          Negative mg/dL                         05/15/2020     

                

                                                                      [facility]

st              

      

 

                    URINE AND STOOL                         UA Ketones          

Negative mg/dL               

                          Negative mg/dL                         05/15/2020     

                

                                                                      [facility]

st              

      

 

                    URINE AND STOOL                         UA Bili             

Negative 

*NA*

                    (5/15/20 8:23 AM)                         Negative          

               

05/15/2020                                                                      

 

                                        [facility]                    

 

                    URINE AND STOOL                         UA Blood            

Negative 

                    (5/15/20 8:23 AM)                         Negative          

               

05/15/2020                                                                      

 

                                         [facility]                    

 

                    URINE AND STOOL                         UA Nitrite          

Negative 

                    (5/15/20 8:23 AM)                         Negative          

               

05/15/2020                                                                      

 

                                        [facility]                    

 

                    URINE AND STOOL                         UA Leuk Est         

Negative 

                    (5/15/20 8:23 AM)                         Negative          

               

05/15/2020                                                                      

 

                                        [facility]                    

 

                    URINE AND STOOL                         UA Sq Epi           

Occasional /LPF               

                    Few /LPF                         05/15/2020                 

          

                                                    [facility]                

    

 

                URINE AND STOOL                         UA RBC          4       

                  0 - 2  

                          05/15/2020                                            

   

                                                    [facility]                

    

 

                    URINE AND STOOL                         UA Color            

Ltyellow                       

                                             05/15/2020                         

                  

                                                    [facility]                

    

 

                    URINE AND STOOL                         UA Urobilinogen     

<=1.0 mg/dL             

                          0.1 - 1.0                         05/15/2020          

              

                                                                      [facility]

st                 

   

 

                CARDIAC ENZYMES                         Total CK        67      

                   12 -

 191                         05/15/2020                                         

                                                    [facility]                

    

 

                    CARDIAC ENZYMES                         Troponin-I          

<0.02                        

                    0.00 - 0.40                         05/15/2020              

                   

                                                    [facility]                

    

 

                CARDIAC ENZYMES                         BNP             65      

                   <=100 

pg/mL                         05/15/2020                                        

                                                    [facility]                

    

 

                CHEM PANEL                         Glucose Lvl     122          

               70 - 

99                         05/15/2020                                           

                                                    [facility]                

    

 

                CHEM PANEL                         BUN             18           

              7 - 22        

                    05/15/2020                                                  

   

                                        [facility]                    

 

                CHEM PANEL                         Creatinine Lvl  1.09         

                

0.50 - 1.40                         05/15/2020                                  

                                                     [facility]                

    

 

                CHEM PANEL                         Sodium Lvl      139          

               135 - 

145                         05/15/2020                                          

                                                     [facility]                

    

 

                CHEM PANEL                         Potassium Lvl   3.9          

               3.5

 - 5.1                         05/15/2020                                       

                                                     [facility]                

    

 

                CHEM PANEL                         Chloride Lvl    106          

               95 -

 109                         05/15/2020                                         

                                                     [facility]                

    

 

                CHEM PANEL                         CO2             22           

              24 - 32       

                    05/15/2020                                                  

  

                                         [facility]                    

 

                CHEM PANEL                         Calcium Lvl     9.5          

               8.5 -

 10.5                         05/15/2020                                        

                                                    [facility]                

    

 

                CHEM PANEL                         Total Protein   7.4          

               6.4

 - 8.4                         05/15/2020                                       

                                                    [facility]                

    

 

                CHEM PANEL                         Albumin Lvl     3.7          

               3.5 -

 5.0                         05/15/2020                                         

                                                     [facility]                

    

 

                CHEM PANEL                         ALT             24           

              0 - 65        

                    05/15/2020                                                  

   

                                         [facility]                    

 

                CHEM PANEL                         AST             27           

              0 - 37        

                    05/15/2020                                                  

   

                                         [facility]                    

 

                CHEM PANEL                         Alk Phos        53           

              39 - 136 

                          05/15/2020                                            

  

                                                     [facility]                

    

 

                CHEM PANEL                         Bili Total      0.7          

               0.2 - 

1.3                         05/15/2020                                          

                                                     [facility]                

    

 

                CHEM PANEL                         AGAP            14.9         

                10.0 - 20.0

                          05/15/2020                                            

 

                                                     [facility]                

    

 

                CHEM PANEL                         B/C Ratio       17           

              6 - 25  

                          05/15/2020                                            

   

                                                     [facility]                

    

 

                CHEM PANEL                         Globulin        3.7          

               2.7 - 

4.2                         05/15/2020                                          

                                                     [facility]                

    

 

                CHEM PANEL                         A/G Ratio       1.0          

               0.7 - 

1.6                         05/15/2020                                          

                                                     [facility]                

    

 

                CHEM PANEL                         eGFR            46           

                           

                    05/15/2020                                                  

Result 

Comment: The eGFR is calculated using the CKD-EPI formula. In most young, 
healthy individuals the eGFR will be >90 mL/min/1.73m2. The eGFR declines with 
age. An eGFR of 60-89 may be normal in some populations, particularly the 
elderly, for whom the CKD-EPI formula has not been extensively validated. Use of
 the eGFR is not recommended in the following populations:<br/><br/>Individuals 
with unstable creatinine concentrations, including pregnant patients and those 
with serious co-morbid conditions.<br/><br/>Patients with extremes in muscle 
mass or diet. <br/><br/>The data above are obtained from the National Kidney 
Disease Education Program (NKDEP) which additionally recommends that when the 
eGFR is used in patients with extremes of body mass index for purposes of drug 
dosing, the eGFR should be multiplied by the estimated BMI.                     
                                        [facility]                    

 

                CHEM PANEL                         Magnesium Lvl   2.0          

               1.8

 - 2.4                         05/15/2020                                       

                                                    [facility]                

    

 

                CHEM PANEL                         Phosphorus      1.9          

               2.5 - 

4.5                         05/15/2020                                          

                                                    [facility]                

    

 

                HEMATOLOGY                         WBC             10.7         

                3.7 - 10.4  

                          05/15/2020                                            

   

                                                    [facility]                         RBC             3.98         

                4.20 - 5.40 

                          05/15/2020                                            

  

                                                    [facility]                         Hgb             12.1         

                12.0 - 16.0 

                          05/15/2020                                            

  

                                                    [facility]                         Hct             35.6         

                36.0 - 48.0 

                          05/15/2020                                            

  

                                                    [facility]                         MCV             89.6         

                80.0 - 98.0 

                          05/15/2020                                            

  

                                                    [facility]                         MCH             30.5         

                27.0 - 31.0 

                          05/15/2020                                            

  

                                                    [facility]                         MCHC            34.1         

                32.0 - 36.0

                          05/15/2020                                            

 

                                                    [facility]                         RDW             14.2         

                11.5 - 14.5 

                          05/15/2020                                            

  

                                                    [facility]                         Platelet        260          

               133 - 

450                         05/15/2020                                          

                                                    [facility]                         MPV             10.1         

                7.4 - 10.4  

                          05/15/2020                                            

   

                                                    [facility]                         D-Dimer         <0.27        

                        

                    05/15/2020                                                  

  

                                        [facility]                         PT              19.5         

                12.0 - 14.7  

                          05/15/2020                                            

   

                                                    [facility]                         INR             1.63         

                0.85 - 1.17 

                          05/15/2020                                            

  

                                                    [facility]                         PTT             29.0         

                22.9 - 35.8 

                          05/15/2020                                            

  

                                                    [facility]                         Segs            71.4         

                45.0 - 75.0

                          05/15/2020                                            

 

                                                     [facility]                

    

 

                HEMATOLOGY                         Lymphocytes     20.6         

                20.0

 - 40.0                         05/15/2020                                      

                                                     [facility]                

    

 

                HEMATOLOGY                         Monocytes       6.1          

               2.0 - 

12.0                         05/15/2020                                         

                                                     [facility]                

    

 

                HEMATOLOGY                         Eosinophils     1.3          

               0.0 -

 4.0                         05/15/2020                                         

                                                     [facility]                

    

 

                HEMATOLOGY                         Basophils       0.6          

               0.0 - 

1.0                         05/15/2020                                          

                                                     [facility]                

    

 

                HEMATOLOGY                         Neutrophils #   7.6          

               1.5

 - 8.1                         05/15/2020                                       

                                                     [facility]                

    

 

                HEMATOLOGY                         Lymphocytes #   2.2          

               1.0

 - 5.5                         05/15/2020                                       

                                                     [facility]                

    

 

                HEMATOLOGY                         Monocytes #     0.7          

               0.0 -

 0.8                         05/15/2020                                         

                                                     [facility]                

    

 

                HEMATOLOGY                         Eosinophils #   0.1          

               0.0

 - 0.5                         05/15/2020                                       

                                                     [facility]                

    

 

                HEMATOLOGY                         Basophils #     0.1          

               0.0 -

 0.2                         05/15/2020                                         

                                                     [facility]                

    

 

                HEMATOLOGY                         POC INR         4.0          

               0.9 - 1.2

                          2020                                            

 

                                                     [facility]                

    

 

                HEMATOLOGY                         POC PT          45.0         

                12.0 - 

14.7                         2020                                         

                                                     [facility]                

    

 

                HEMATOLOGY                         POC INR         2.8          

               0.9 - 1.2

                          2020                                            

 

                                                     [facility]                

    

 

                HEMATOLOGY                         POC PT          32.2         

                12.0 - 

14.7                         2020                                         

                                                     [facility]                

    

 

                HEMATOLOGY                         POC INR         2.9          

               0.9 - 1.2

                          2020                                            

 

                                                     [facility]                

    

 

                HEMATOLOGY                         POC PT          32.9         

                12.0 - 

14.7                         2020                                         

                                                     [facility]                

    

 

                HEMATOLOGY                         POC INR         1.5          

               0.9 - 1.2

                          2020                                            

 

                                                     [facility]                

    

 

                HEMATOLOGY                         POC PT          17.5         

                12.0 - 

14.7                         2020                                         

                                                     [facility]                

    

 

                HEMATOLOGY                         POC INR         1.3          

               0.9 - 1.2

                          2020                                            

 

                                                     [facility]                

    

 

                HEMATOLOGY                         POC PT          15.9         

                12.0 - 

14.7                         2020                                         

                                                    [facility]                

    

 

                CARDIAC ENZYMES                         Total CK        34      

                   12 -

 191                         2020                                         

                                                    [facility]                

    

 

                    CARDIAC ENZYMES                         Troponin-I          

<0.02                        

                    0.00 - 0.40                         2020              

                   

                                                    [facility]                

    

 

                CHEM PANEL                         Glucose Lvl     104          

               70 - 

99                         2020                                           

                                                    [facility]                

    

 

                CHEM PANEL                         BUN             22           

              7 - 22        

                    2020                                                  

   

                                        [facility]                    

 

                CHEM PANEL                         Creatinine Lvl  0.94         

                

0.50 - 1.40                         2020                                  

                                                    [facility]                

    

 

                CHEM PANEL                         Sodium Lvl      143          

               135 - 

145                         2020                                          

                                                    [facility]                

    

 

                CHEM PANEL                         Potassium Lvl   3.9          

               3.5

 - 5.1                         2020                                       

                                                     [facility]                

    

 

                CHEM PANEL                         Chloride Lvl    113          

               95 -

 109                         2020                                         

                                                     [facility]                

    

 

                CHEM PANEL                         CO2             26           

              24 - 32       

                    2020                                                  

  

                                         [facility]                    

 

                CHEM PANEL                         Calcium Lvl     9.1          

               8.5 -

 10.5                         2020                                        

                                                     [facility]                

    

 

                CHEM PANEL                         Total Protein   6.6          

               6.4

 - 8.4                         2020                                       

                                                     [facility]                

    

 

                CHEM PANEL                         Albumin Lvl     3.4          

               3.5 -

 5.0                         2020                                         

                                                     [facility]                

    

 

                CHEM PANEL                         ALT             23           

              0 - 65        

                    2020                                                  

   

                                         [facility]                    

 

                CHEM PANEL                         AST             25           

              0 - 37        

                    2020                                                  

   

                                         [facility]                    

 

                CHEM PANEL                         Alk Phos        53           

              39 - 136 

                          2020                                            

  

                                                     [facility]                

    

 

                CHEM PANEL                         Bili Total      0.2          

               0.2 - 

1.3                         2020                                          

                                                     [facility]                

    

 

                CHEM PANEL                         AGAP            7.9          

               10.0 - 20.0 

                          2020                                            

  

                                                     [facility]                

    

 

                CHEM PANEL                         B/C Ratio       23           

              6 - 25  

                          2020                                            

   

                                                     [facility]                

    

 

                CHEM PANEL                         Globulin        3.2          

               2.7 - 

4.2                         2020                                          

                                                     [facility]                

    

 

                CHEM PANEL                         A/G Ratio       1.1          

               0.7 - 

1.6                         2020                                          

                                                     [facility]                

    

 

                CHEM PANEL                         eGFR            55           

                           

                    2020                                                  

Result 

Comment: The eGFR is calculated using the CKD-EPI formula. In most young, 
healthy individuals the eGFR will be >90 mL/min/1.73m2. The eGFR declines with 
age. An eGFR of 60-89 may be normal in some populations, particularly the 
elderly, for whom the CKD-EPI formula has not been extensively validated. Use of
 the eGFR is not recommended in the following populations:<br/><br/>Individuals 
with unstable creatinine concentrations, including pregnant patients and those 
with serious co-morbid conditions.<br/><br/>Patients with extremes in muscle 
mass or diet. <br/><br/>The data above are obtained from the National Kid[employer] 
Disease Education Program (NKDEP) which additionally recommends that when the 
eGFR is used in patients with extremes of body mass index for purposes of drug 
dosing, the eGFR should be multiplied by the estimated BMI.                     
                                         [facility]                    

 

                CHEM PANEL                         Lipase Lvl      150          

               73 - 

393                         2020                                          

                                                    [facility]                

    

 

                HEMATOLOGY                         WBC             7.7          

               3.7 - 10.4   

                          2020                                            

    

                                                    [facility]                

    

 

                HEMATOLOGY                         RBC             3.76         

                4.20 - 5.40 

                          2020                                            

  

                                                    [facility]                

    

 

                HEMATOLOGY                         Hgb             11.4         

                12.0 - 16.0 

                          2020                                            

  

                                                    [facility]                

    

 

                HEMATOLOGY                         Hct             33.9         

                36.0 - 48.0 

                          2020                                            

  

                                                    [facility]                

    

 

                HEMATOLOGY                         MCV             90.0         

                80.0 - 98.0 

                          2020                                            

  

                                                    [facility]                         MCH             30.3         

                27.0 - 31.0 

                          2020                                            

  

                                                    [facility]                         MCHC            33.7         

                32.0 - 36.0

                          2020                                            

 

                                                    [facility]                

    

 

                HEMATOLOGY                         RDW             14.4         

                11.5 - 14.5 

                          2020                                            

  

                                                    [facility]                

    

 

                HEMATOLOGY                         Platelet        179          

               133 - 

450                         2020                                          

                                                    [facility]                         MPV             10.4         

                7.4 - 10.4  

                          2020                                            

   

                                                    [facility]                

    

 

                HEMATOLOGY                         PT              16.6         

                12.0 - 14.7  

                          2020                                            

   

                                                    [facility]                

    

 

                HEMATOLOGY                         INR             1.33         

                0.85 - 1.17 

                          2020                                            

  

                                                    [facility]                         PTT             25.7         

                22.9 - 35.8 

                          2020                                            

  

                                                    [facility]                

    

 

                HEMATOLOGY                         Segs            77.3         

                45.0 - 75.0

                          2020                                            

 

                                                    [facility]                

    

 

                HEMATOLOGY                         Lymphocytes     13.6         

                20.0

 - 40.0                         2020                                      

                                                    [facility]                

    

 

                HEMATOLOGY                         Monocytes       7.0          

               2.0 - 

12.0                         2020                                         

                                                    [facility]                

    

 

                HEMATOLOGY                         Eosinophils     1.7          

               0.0 -

 4.0                         2020                                         

                                                    [facility]                

    

 

                HEMATOLOGY                         Basophils       0.4          

               0.0 - 

1.0                         2020                                          

                                                    [facility]                         Neutrophils #   5.9          

               1.5

 - 8.1                         2020                                       

                                                    [facility]                         Lymphocytes #   1.0          

               1.0

 - 5.5                         2020                                       

                                                    [facility]                         Monocytes #     0.5          

               0.0 -

 0.8                         2020                                         

                                                    [facility]                

    

 

                HEMATOLOGY                         Eosinophils #   0.1          

               0.0

 - 0.5                         2020                                       

                                                    [facility]                

    

 

                    URINE AND STOOL                         UA Turbidity        

Clear 

                    (20 2:31 PM)                         Clear              

           

2020                                                                      

 

                                        [facility]                    

 

                    URINE AND STOOL                         UA Spec Grav        

1.004                      

                    <=1.030                         2020                  

                 

                                                    [facility]                

    

 

                URINE AND STOOL                         UA pH           7.0     

                    5.0 - 

8.0                         2020                                          

                                                    [facility]                

    

 

                    URINE AND STOOL                         UA Protein          

Negative mg/dL               

                          Negative mg/dL                         2020     

                

                                                                      [facility]              

      

 

                    URINE AND STOOL                         UA Glucose          

Negative mg/dL               

                          Negative mg/dL                         2020     

                

                                                                      [facility]

st              

      

 

                    URINE AND STOOL                         UA Ketones          

Negative mg/dL               

                          Negative mg/dL                         2020     

                

                                                                      [facility]              

      

 

                    URINE AND STOOL                         UA Bili             

Negative 

*NA*

                    (20 2:31 PM)                         Negative           

              

2020                                                                      

 

                                        [facility]                    

 

                    URINE AND STOOL                         UA Blood            

Small 

*ABN*

                    (20 2:31 PM)                         Negative           

              

2020                                                                      

 

                                        [facility]                    

 

                    URINE AND STOOL                         UA Nitrite          

Negative 

                    (20 2:31 PM)                         Negative           

              

2020                                                                      

 

                                        [facility]                    

 

                    URINE AND STOOL                         UA Leuk Est         

Negative 

                    (20 2:31 PM)                         Negative           

              

2020                                                                      

 

                                        [facility]                    

 

                    URINE AND STOOL                         UA Sq Epi           

Occasional /LPF               

                    Few /LPF                         2020                 

          

                                                    [facility]                

    

 

                URINE AND STOOL                         UA WBC          <1      

                   0 - 5 

                          2020                                            

  

                                                    [facility]                

    

 

                URINE AND STOOL                         UA RBC          1       

                  0 - 2  

                          2020                                            

   

                                                    [facility]                

    

 

                    URINE AND STOOL                         UA Bacteria         

Occasional /HPF             

                          None Seen /HPF                         2020     

              

                                                                      [facility]            

        

 

                    URINE AND STOOL                         UA Color            

Ltyellow                       

                                             2020                         

                  

                                                    [facility]                

    

 

                    URINE AND STOOL                         UA Urobilinogen     

<=1.0 mg/dL             

                          0.1 - 1.0                         2020          

              

                                                                      [facility]                 

   

 

                HEMATOLOGY                         POC INR         1.6          

               0.9 - 1.2

                          2020                                            

 

                                                    [facility]                

    

 

                HEMATOLOGY                         POC PT          19.0         

                12.0 - 

14.7                         2020                                         

                                                    [facility]                

    

 

                HEMATOLOGY                         POC INR         3.9          

               0.9 - 1.2

                          2019                                            

 

                                                    [facility]                

    

 

                HEMATOLOGY                         POC PT          43.6         

                12.0 - 

14.7                         2019                                         

                                                    [facility]                

    

 

                    CARDIAC ENZYMES                         Troponin-I          

<0.02                        

                    0.00 - 0.40                         2019              

                   

                                                    [facility]                

    

 

                CHEM PANEL                         Magnesium Lvl   1.6          

               1.8

 - 2.4                         2019                                       

                                                    [facility]                

    

 

                CHEM PANEL                         Phosphorus      3.2          

               2.5 - 

4.5                         2019                                          

                                                    [facility]                

    

 

                CHEM PANEL                         Glucose Lvl     97           

              70 - 

99                         2019                                           

                                                    [facility]                

    

 

                CHEM PANEL                         BUN             15           

              7 - 22        

                    2019                                                  

   

                                        [facility]                    

 

                CHEM PANEL                         Creatinine Lvl  0.71         

                

0.50 - 1.40                         2019                                  

                                                    [facility]                

    

 

                CHEM PANEL                         Sodium Lvl      144          

               135 - 

145                         2019                                          

                                                    [facility]                

    

 

                CHEM PANEL                         Potassium Lvl   3.4          

               3.5

 - 5.1                         2019                                       

                                                    [facility]                

    

 

                CHEM PANEL                         Chloride Lvl    108          

               95 -

 109                         2019                                         

                                                    [facility]                

    

 

                CHEM PANEL                         CO2             30           

              24 - 32       

                    2019                                                  

  

                                        [facility]                    

 

                CHEM PANEL                         AGAP            9.4          

               10.0 - 20.0 

                          2019                                            

  

                                                    [facility]                

    

 

                CHEM PANEL                         Calcium Lvl     8.0          

               8.5 -

 10.5                         2019                                        

                                                    [facility]                

    

 

                CHEM PANEL                         B/C Ratio       21           

              6 - 25  

                          2019                                            

   

                                                    [facility]                

    

 

                CHEM PANEL                         Total Protein   6.7          

               6.4

 - 8.4                         2019                                       

                                                    [facility]                

    

 

                CHEM PANEL                         Albumin Lvl     2.9          

               3.5 -

 5.0                         2019                                         

                                                     [facility]                

    

 

                CHEM PANEL                         Globulin        3.8          

               2.7 - 

4.2                         2019                                          

                                                    [facility]                

    

 

                CHEM PANEL                         A/G Ratio       0.8          

               0.7 - 

1.6                         2019                                          

                                                     [facility]                

    

 

                CHEM PANEL                         ALT             20           

              0 - 65        

                    2019                                                  

   

                                        [facility]                    

 

                CHEM PANEL                         AST             20           

              0 - 37        

                    2019                                                  

   

                                         [facility]                    

 

                CHEM PANEL                         Alk Phos        63           

              39 - 136 

                          2019                                            

  

                                                    [facility]                

    

 

                CHEM PANEL                         Bili Total      0.4          

               0.2 - 

1.3                         2019                                          

                                                    [facility]                

    

 

                CHEM PANEL                         eGFR            77           

                           

                    2019                                                  

Result 

Comment: The eGFR is calculated using the CKD-EPI formula. In most young, 
healthy individuals the eGFR will be >90 mL/min/1.73m2. The eGFR declines with 
age. An eGFR of 60-89 may be normal in some populations, particularly the 
elderly, for whom the CKD-EPI formula has not been extensively validated. Use of
 the eGFR is not recommended in the following populations:<br/><br/>Individuals 
with unstable creatinine concentrations, including pregnant patients and those 
with serious co-morbid conditions.<br/><br/>Patients with extremes in muscle 
mass or diet. <br/><br/>The data above are obtained from the National Kidney 
Disease Education Program (NKDEP) which additionally recommends that when the 
eGFR is used in patients with extremes of body mass index for purposes of drug 
dosing, the eGFR should be multiplied by the estimated BMI.                     
                                        [facility]                    

 

                HEMATOLOGY                         WBC             8.4          

               3.7 - 10.4   

                          2019                                            

    

                                                    [facility]                

    

 

                HEMATOLOGY                         RBC             3.60         

                4.20 - 5.40 

                          2019                                            

  

                                                    [facility]                

    

 

                HEMATOLOGY                         Hgb             10.6         

                12.0 - 16.0 

                          2019                                            

  

                                                    [facility]                

    

 

                HEMATOLOGY                         Hct             31.6         

                36.0 - 48.0 

                          2019                                            

  

                                                    [facility]                

    

 

                HEMATOLOGY                         MCV             87.9         

                80.0 - 98.0 

                          2019                                            

  

                                                    [facility]                

    

 

                HEMATOLOGY                         MCH             29.4         

                27.0 - 31.0 

                          2019                                            

  

                                                    [facility]                

    

 

                HEMATOLOGY                         MCHC            33.4         

                32.0 - 36.0

                          2019                                            

 

                                                    [facility]                

    

 

                HEMATOLOGY                         RDW             13.6         

                11.5 - 14.5 

                          2019                                            

  

                                                    [facility]                

    

 

                HEMATOLOGY                         Platelet        249          

               133 - 

450                         2019                                          

                                                    [facility]                

    

 

                HEMATOLOGY                         MPV             9.8          

               7.4 - 10.4   

                          2019                                            

    

                                                    [facility]                

    

 

                HEMATOLOGY                         PTT             43.4         

                22.9 - 35.8 

                          2019                                            

  

                                                    [facility]                

    

 

                HEMATOLOGY                         PT              33.0         

                12.0 - 14.7  

                          2019                                            

   

                                                    [facility]                

    

 

                HEMATOLOGY                         INR             3.14         

                0.85 - 1.17 

                          2019                                            

  

                                                    [facility]                

    

 

                    CHEM PANEL                         Vitamin D, 25-OH, Total 3

5.8                 

                    30.0 - 100.0                         2019             

            

                                                    [facility]                

  

  

 

                    PARATHYROID PROFILE                         PTH Intact      

    120.6                    

                    18.4 - 80.1                         2019              

               

                                                    [facility]                

    

 

                    PARATHYROID PROFILE                         Ca Ion WB       

    0.88                      

                    1.05 - 1.25                         2019              

                 

                                        Result Comment: Critical Result(s) hamilton

d to Goyo Mena at 

2019 23:21 by AR.  Read back OK.                         [facility]     

               

 

                    PARATHYROID PROFILE                         Ca Norm WB      

    0.89                     

                    1.05 - 1.25                         2019              

                

                                        Result Comment: Critical Result(s) hamilton

d to Goyo Mena at 

2019 23:22 by AR.  Read back OK.                         [facility]     

               

 

                    CARDIAC ENZYMES                         Troponin-I          

<0.02                        

                    0.00 - 0.40                         2019              

                   

                                                    [facility]                

    

 

                CARDIAC ENZYMES                         BNP             175     

                    <=100 

pg/mL                         2019                                        

                                                    [facility]                

    

 

                CHEM PANEL                         Glucose Lvl     110          

               70 - 

99                         2019                                           

                                                    [facility]                

    

 

                CHEM PANEL                         BUN             17           

              7 - 22        

                    2019                                                  

   

                                        [facility]                    

 

                CHEM PANEL                         Creatinine Lvl  0.95         

                

0.50 - 1.40                         2019                                  

                                                    [facility]                

    

 

                CHEM PANEL                         Sodium Lvl      143          

               135 - 

145                         2019                                          

                                                    [facility]                

    

 

                CHEM PANEL                         Potassium Lvl   3.7          

               3.5

 - 5.1                         2019                                       

                                                    [facility]                

    

 

                CHEM PANEL                         Chloride Lvl    109          

               95 -

 109                         2019                                         

                                                    [facility]                

    

 

                CHEM PANEL                         CO2             25           

              24 - 32       

                    2019                                                  

  

                                        [facility]                    

 

                CHEM PANEL                         AGAP            12.7         

                10.0 - 20.0

                          2019                                            

 

                                                     [facility]                

    

 

                CHEM PANEL                         Calcium Lvl     6.9          

               8.5 -

 10.5                         2019                                        

                                        Result Comment: Specimen slightly hemoly

zed. 2019 19:07 

iko<br/><br/>Critical Result(s) called to Keyur at 2019 19:07 by eze.  
Read back OK.                            [facility]                    

 

                CHEM PANEL                         B/C Ratio       18           

              6 - 25  

                          2019                                            

   

                                                     [facility]                

    

 

                CHEM PANEL                         Total Protein   7.7          

               6.4

 - 8.4                         2019                                       

                                                     [facility]                

    

 

                CHEM PANEL                         Albumin Lvl     3.2          

               3.5 -

 5.0                         2019                                         

                                                     [facility]                

    

 

                CHEM PANEL                         Globulin        4.5          

               2.7 - 

4.2                         2019                                          

                                                     [facility]                

    

 

                CHEM PANEL                         A/G Ratio       0.7          

               0.7 - 

1.6                         2019                                          

                                                     [facility]                

    

 

                CHEM PANEL                         ALT             26           

              0 - 65        

                    2019                                                  

   

                                         [facility]                    

 

                CHEM PANEL                         AST             33           

              0 - 37        

                    2019                                                  

   

                                         [facility]                    

 

                CHEM PANEL                         Alk Phos        76           

              39 - 136 

                          2019                                            

  

                                                     [facility]                

    

 

                CHEM PANEL                         Bili Total      0.4          

               0.2 - 

1.3                         2019                                          

                                                     [facility]                

    

 

                CHEM PANEL                         eGFR            54           

                           

                    2019                                                  

Result 

Comment: The eGFR is calculated using the CKD-EPI formula. In most young, 
healthy individuals the eGFR will be >90 mL/min/1.73m2. The eGFR declines with 
age. An eGFR of 60-89 may be normal in some populations, particularly the 
elderly, for whom the CKD-EPI formula has not been extensively validated. Use of
 the eGFR is not recommended in the following populations:<br/><br/>Individuals 
with unstable creatinine concentrations, including pregnant patients and those 
with serious co-morbid conditions.<br/><br/>Patients with extremes in muscle 
mass or diet. <br/><br/>The data above are obtained from the National Kidney 
Disease Education Program (NKDEP) which additionally recommends that when the 
eGFR is used in patients with extremes of body mass index for purposes of drug 
dosing, the eGFR should be multiplied by the estimated BMI.                     
                                         [facility]                    

 

                CHEM PANEL                         Lipase Lvl      196          

               73 - 

393                         2019                                          

                                                     [facility]                

    

 

                CHEM PANEL                         Magnesium Lvl   0.8          

               1.8

 - 2.4                         2019                                       

                                                     [facility]                

    

 

                CHEM PANEL                         Phosphorus      2.6          

               2.5 - 

4.5                         2019                                          

                                                    [facility]                

    

 

                HEMATOLOGY                         WBC             11.5         

                3.7 - 10.4  

                          2019                                            

   

                                                    [facility]                

    

 

                HEMATOLOGY                         RBC             4.03         

                4.20 - 5.40 

                          2019                                            

  

                                                    [facility]                

    

 

                HEMATOLOGY                         Hgb             12.0         

                12.0 - 16.0 

                          2019                                            

  

                                                    [facility]                

    

 

                HEMATOLOGY                         Hct             36.0         

                36.0 - 48.0 

                          2019                                            

  

                                                    [facility]                

    

 

                HEMATOLOGY                         MCV             89.1         

                80.0 - 98.0 

                          2019                                            

  

                                                    [facility]                

    

 

                HEMATOLOGY                         MCH             29.7         

                27.0 - 31.0 

                          2019                                            

  

                                                    [facility]                

    

 

                HEMATOLOGY                         MCHC            33.3         

                32.0 - 36.0

                          2019                                            

 

                                                    [facility]                

    

 

                HEMATOLOGY                         RDW             13.7         

                11.5 - 14.5 

                          2019                                            

  

                                                    [facility]                

    

 

                HEMATOLOGY                         Platelet        275          

               133 - 

450                         2019                                          

                                                    [facility]                

    

 

                HEMATOLOGY                         MPV             10.1         

                7.4 - 10.4  

                          2019                                            

   

                                                     [facility]                

    

 

                HEMATOLOGY                         PT              34.5         

                12.0 - 14.7  

                          2019                                            

   

                                                     [facility]                

    

 

                HEMATOLOGY                         INR             3.32         

                0.85 - 1.17 

                          2019                                            

  

                                                     [facility]                

    

 

                HEMATOLOGY                         PTT             34.8         

                22.9 - 35.8 

                          2019                                            

  

                                                     [facility]                

    

 

                HEMATOLOGY                         Segs            61.1         

                45.0 - 75.0

                          2019                                            

 

                                                     [facility]                

    

 

                HEMATOLOGY                         Lymphocytes     27.2         

                20.0

 - 40.0                         2019                                      

                                                     [facility]                

    

 

                HEMATOLOGY                         Monocytes       8.1          

               2.0 - 

12.0                         2019                                         

                                                     [facility]                

    

 

                HEMATOLOGY                         Eosinophils     2.7          

               0.0 -

 4.0                         2019                                         

                                                     [facility]                

    

 

                HEMATOLOGY                         Basophils       0.9          

               0.0 - 

1.0                         2019                                          

                                                     [facility]                

    

 

                HEMATOLOGY                         Neutrophils #   7.0          

               1.5

 - 8.1                         2019                                       

                                                     [facility]                

    

 

                HEMATOLOGY                         Lymphocytes #   3.1          

               1.0

 - 5.5                         2019                                       

                                                     [facility]                

    

 

                HEMATOLOGY                         Monocytes #     0.9          

               0.0 -

 0.8                         2019                                         

                                                     [facility]                

    

 

                HEMATOLOGY                         Eosinophils #   0.3          

               0.0

 - 0.5                         2019                                       

                                                     [facility]                

    

 

                HEMATOLOGY                         Basophils #     0.1          

               0.0 -

 0.2                         2019                                         

                                                     [facility]                

    

 

                HEMATOLOGY                         POC INR         2.8          

               0.9 - 1.2

                          2019                                            

 

                                                     [facility]                

    

 

                HEMATOLOGY                         POC PT          31.8         

                12.0 - 

14.7                         2019                                         

                                                     [facility]                

    

 

                HEMATOLOGY                         POC INR         1.9          

               0.9 - 1.2

                          2019                                            

 

                                                    [facility]                

    

 

                HEMATOLOGY                         POC PT          22.5         

                12.0 - 

14.7                         2019                                         

                                                    [facility]                

    

 

                HEMATOLOGY                         POC INR         1.6          

               0.9 - 1.2

                          2019                                            

 

                                                    [facility]                

    

 

                HEMATOLOGY                         POC PT          18.5         

                12.0 - 

14.7                         2019                                         

                                                    [facility]                

    

 

                URINE AND STOOL                         UA WBC          4       

                  0 - 5  

                          2018                                            

   

                                                    [facility]                

    

 

                URINE AND STOOL                         UA RBC          2       

                  0 - 2  

                          2018                                            

   

                                                    [facility]                

    

 

                    URINE AND STOOL                         UA Sq Epi           

Occasional /LPF               

                    Few /LPF                         2018                 

          

                                                    [facility]                

    

 

                    URINE AND STOOL                         UA Bacteria         

Many /HPF                   

                    None Seen /HPF                         2018           

              

                                                    [facility]                

  

  

 

                    URINE AND STOOL                         UA Color            

Ltyellow                       

                                             2018                         

                  

                                                    [facility]                

    

 

                    URINE AND STOOL                         UA Urobilinogen     

<=1.0 mg/dL             

                          0.1 - 1.0                         2018          

              

                                                                      [facility]                 

   

 

                    URINE AND STOOL                         UA Blood            

Negative 

                    (18 4:01 PM)                         Negative           

              

2018                                                                      

 

                                        [facility]                    

 

                    URINE AND STOOL                         UA Nitrite          

Negative 

                    (18 4:01 PM)                         Negative           

              

2018                                                                      

 

                                        [facility]                    

 

                    URINE AND STOOL                         UA Leuk Est         

Negative 

                    (18 4:01 PM)                         Negative           

              

2018                                                                      

 

                                        [facility]                    

 

                    URINE AND STOOL                         UA Glucose          

Negative mg/dL               

                          Negative mg/dL                         2018     

                

                                                                      [facility]              

      

 

                    URINE AND STOOL                         UA Ketones          

Negative mg/dL               

                          Negative mg/dL                         2018     

                

                                                                      [facility]              

      

 

                    URINE AND STOOL                         UA Protein          

30 mg/dL                     

                    Negative mg/dL                         2018           

                

                                                    [facility]                

    

 

                URINE AND STOOL                         UA pH           7.0     

                    5.0 - 

8.0                         2018                                          

                                                    [facility]                

    

 

                    URINE AND STOOL                         UA Bili             

Negative 

*NA*

                    (18 4:01 PM)                         Negative           

              

2018                                                                      

 

                                        [facility]                    

 

                    URINE AND STOOL                         UA Turbidity        

Clear 

                    (18 4:01 PM)                         Clear              

           

2018                                                                      

 

                                        [facility]                    

 

                    URINE AND STOOL                         UA Spec Grav        

1.010                      

                    <=1.030                         2018                  

                 

                                                    [facility]                

    

 

                    CARDIAC ENZYMES                         Troponin-I          

<0.02                        

                    0.00 - 0.40                         2018              

                   

                                                    [facility]                

    

 

                CARDIAC ENZYMES                         BNP             327     

                    <=100 

pg/mL                         2018                                        

                                                    [facility]                

    

 

                CHEM PANEL                         A/G Ratio       1.1          

               0.7 - 

1.6                         2018                                          

                                                    [facility]                

    

 

                CHEM PANEL                         Globulin        3.8          

               2.7 - 

4.2                         2018                                          

                                                    [facility]                

    

 

                CHEM PANEL                         AGAP            10.2         

                10.0 - 20.0

                          2018                                            

 

                                                    [facility]                

    

 

                CHEM PANEL                         B/C Ratio       24           

              6 - 25  

                          2018                                            

   

                                                    [facility]                

    

 

                CHEM PANEL                         eGFR            55           

                           

                    2018                                                  

Result 

Comment: The eGFR is calculated using the CKD-EPI formula. In most young, 
healthy individuals the eGFR will be >90 mL/min/1.73m2. The eGFR declines with 
age. An eGFR of 60-89 may be normal in some populations, particularly the 
elderly, for whom the CKD-EPI formula has not been extensively validated. Use of
 the eGFR is not recommended in the following populations:<br/><br/>Individuals 
with unstable creatinine concentrations, including pregnant patients and those 
with serious co-morbid conditions.<br/><br/>Patients with extremes in muscle 
mass or diet. <br/><br/>The data above are obtained from the National Kid[employer] 
Disease Education Program (NKDEP) which additionally recommends that when the 
eGFR is used in patients with extremes of body mass index for purposes of drug 
dosing, the eGFR should be multiplied by the estimated BMI.                     
                                        [facility]                    

 

                CHEM PANEL                         Alk Phos        44           

              39 - 136 

                          2018                                            

  

                                                    [facility]                

    

 

                CHEM PANEL                         AST             45           

              0 - 37        

                    2018                                                  

   

                                        [facility]                    

 

                CHEM PANEL                         ALT             36           

              0 - 65        

                    2018                                                  

   

                                        [facility]                    

 

                CHEM PANEL                         Albumin Lvl     4.0          

               3.5 -

 5.0                         2018                                         

                                                    [facility]                

    

 

                CHEM PANEL                         Total Protein   7.8          

               6.4

 - 8.4                         2018                                       

                                                    [facility]                

    

 

                CHEM PANEL                         Calcium Lvl     9.3          

               8.5 -

 10.5                         2018                                        

                                                    [facility]                

    

 

                CHEM PANEL                         CO2             31           

              24 - 32       

                    2018                                                  

  

                                        [facility]                    

 

                CHEM PANEL                         Chloride Lvl    106          

               95 -

 109                         2018                                         

                                                    [facility]                

    

 

                CHEM PANEL                         Potassium Lvl   4.2          

               3.5

 - 5.1                         2018                                       

                                                    [facility]                

    

 

                CHEM PANEL                         Sodium Lvl      143          

               135 - 

145                         2018                                          

                                                    [facility]                

    

 

                CHEM PANEL                         Creatinine Lvl  0.95         

                

0.50 - 1.40                         2018                                  

                                                    [facility]                

    

 

                CHEM PANEL                         BUN             23           

              7 - 22        

                    2018                                                  

   

                                        [facility]                    

 

                CHEM PANEL                         Bili Total      0.5          

               0.2 - 

1.3                         2018                                          

                                                    [facility]                

    

 

                CHEM PANEL                         Glucose Lvl     102          

               70 - 

99                         2018                                           

                                                    [facility]                

    

 

                HEMATOLOGY                         MPV             10.3         

                7.4 - 10.4  

                          2018                                            

   

                                                    [facility]                         Platelet        229          

               133 - 

450                         2018                                          

                                                    [facility]                         RDW             13.9         

                11.5 - 14.5 

                          2018                                            

  

                                                    [facility]                         MCH             30.3         

                27.0 - 31.0 

                          2018                                            

  

                                                    [facility]                         MCV             89.0         

                80.0 - 98.0 

                          2018                                            

  

                                                    [facility]                         Hct             36.7         

                36.0 - 48.0 

                          2018                                            

  

                                                    [facility]                         Hgb             12.5         

                12.0 - 16.0 

                          2018                                            

  

                                                    [facility]                         MCHC            34.1         

                32.0 - 36.0

                          2018                                            

 

                                                    [facility]                         WBC             12.0         

                3.7 - 10.4  

                          2018                                            

   

                                                    [facility]                         RBC             4.12         

                4.20 - 5.40 

                          2018                                            

  

                                                    [facility]                         Basophils #     0.1          

               0.0 -

 0.2                         2018                                         

                                                    [facility]                         Monocytes #     0.5          

               0.0 -

 0.8                         2018                                         

                                                    [facility]                         Lymphocytes #   1.5          

               1.0

 - 5.5                         2018                                       

                                                    [facility]                         Segs-Bands #    9.9          

               1.5 

- 8.1                         2018                                        

                                                    [facility]                         Basophils       0.4          

               0.0 - 

1.0                         2018                                          

                                                    [facility]                         Eosinophils     0.3          

               0.0 -

 4.0                         2018                                         

                                                    [facility]                         Lymphocytes     12.4         

                20.0

 - 40.0                         2018                                      

                                                    [facility]                         Monocytes       4.1          

               2.0 - 

12.0                         2018                                         

                                                    [facility]                         Segs            82.8         

                45.0 - 75.0

                          2018                                            

 

                                                    [facility]                

    

 

                ELECTROLYTES                         AGAP            13.0       

                  10.0 - 

20.0                         2017                                         

                                                    [facility]                

    

 

                ELECTROLYTES                         eGFR            69         

                           

                    2017                                                  

Result

 Comment: The eGFR is calculated using the CKD-EPI formula. In most young, 
healthy individuals the eGFR will be >90 mL/min/1.73m2. The eGFR declines with 
age. An eGFR of 60-89 may be normal in some populations, particularly the 
elderly, for whom the CKD-EPI formula has not been extensively validated. Use of
 the eGFR is not recommended in the following populations:<br/><br/>Individuals 
with unstable creatinine concentrations, including pregnant patients and those 
with serious co-morbid conditions.<br/><br/>Patients with extremes in muscle 
mass or diet. <br/><br/>The data above are obtained from the National Kidney 
Disease Education Program (NKDEP) which additionally recommends that when the 
eGFR is used in patients with extremes of body mass index for purposes of drug 
dosing, the eGFR should be multiplied by the estimated BMI.                     
                                        [facility]                    

 

                ELECTROLYTES                         Chloride Lvl    105        

                 95

 - 109                         2017                                       

                                                    [facility]                

    

 

                ELECTROLYTES                         CO2             25         

                24 - 32     

                    2017                                                  

                                        [facility]                    

 

                ELECTROLYTES                         Calcium Lvl     8.4        

                 8.5

 - 10.5                         2017                                      

                                                    [facility]                

    

 

                ELECTROLYTES                         Potassium Lvl   4.0        

                 

3.5 - 5.1                         2017                                    

                                                    [facility]                

    

 

                    ELECTROLYTES                         Creatinine Lvl      0.7

9                        

                    0.50 - 1.40                         2017              

                   

                                                    [facility]                

    

 

                ELECTROLYTES                         Glucose Lvl     110        

                 70 

- 99                         2017                                         

                                                    [facility]                

    

 

                ELECTROLYTES                         BUN             20         

                7 - 22      

                    2017                                                  

 

                                        [facility]                    

 

                ELECTROLYTES                         Sodium Lvl      139        

                 135 

- 145                         2017                                        

                                                    [facility]                

    

 

                HEMATOLOGY                         Basophils #     0.1          

               0.0 -

 0.2                         2017                                         

                                                    [facility]                

    

 

                HEMATOLOGY                         Eosinophils #   0.2          

               0.0

 - 0.5                         2017                                       

                                                    [facility]                

    

 

                HEMATOLOGY                         Lymphocytes #   1.6          

               1.0

 - 5.5                         2017                                       

                                                    [facility]                

    

 

                HEMATOLOGY                         Monocytes #     0.5          

               0.0 -

 0.8                         2017                                         

                                                    [facility]                         RBC Morph           Malini

l 

                    (17 9:51 AM)                                          

        2017  

                                                                         [facility]                         Plt Morph           Malini

l 

                    (17 9:51 AM)                                          

        2017  

                                                                         [facility]                    

 

                HEMATOLOGY                         Segs            72.4         

                45.0 - 75.0

                          2017                                            

 

                                                    [facility]                

    

 

                HEMATOLOGY                         Basophils       0.6          

               0.0 - 

1.0                         2017                                          

                                                    [facility]                

    

 

                HEMATOLOGY                         Lymphocytes     19.0         

                20.0

 - 40.0                         2017                                      

                                                    [facility]                

    

 

                HEMATOLOGY                         Monocytes       6.2          

               2.0 - 

12.0                         2017                                         

                                                    [facility]                

    

 

                HEMATOLOGY                         Eosinophils     1.8          

               0.0 -

 4.0                         2017                                         

                                                    [facility]                

    

 

                HEMATOLOGY                         Segs-Bands #    6.1          

               1.5 

- 8.1                         2017                                        

                                                    [facility]                

    

 

                HEMATOLOGY                         INR             2.42         

                0.85 - 1.17 

                          2017                                            

  

                                                    [facility]                

    

 

                HEMATOLOGY                         PT              26.6         

                12.0 - 14.7  

                          2017                                            

   

                                                    [facility]                         PTT             35.0         

                22.9 - 35.8 

                          2017                                            

  

                                                    [facility]                         MPV             9.8          

               7.4 - 10.4   

                          2017                                            

    

                                                    [facility]                

    

 

                HEMATOLOGY                         Platelet        235          

               133 - 

450                         2017                                          

                                                    [facility]                

    

 

                HEMATOLOGY                         RDW             13.8         

                11.5 - 14.5 

                          2017                                            

  

                                                    [facility]                         MCHC            34.8         

                32.0 - 36.0

                          2017                                            

 

                                                    [facility]                         MCH             31.1         

                27.0 - 31.0 

                          2017                                            

  

                                                    [facility]                

    

 

                HEMATOLOGY                         MCV             89.2         

                80.0 - 98.0 

                          2017                                            

  

                                                    [facility]                

    

 

                HEMATOLOGY                         Hct             37.1         

                36.0 - 48.0 

                          2017                                            

  

                                                    [facility]                

    

 

                HEMATOLOGY                         WBC             8.5          

               3.7 - 10.4   

                          2017                                            

    

                                                    [facility]                

    

 

                HEMATOLOGY                         Hgb             12.9         

                12.0 - 16.0 

                          2017                                            

  

                                                    [facility]                         RBC             4.15         

                4.20 - 5.40 

                          2017                                            

  

                                                    [facility]                

    

 

                CHEM PANEL                         eGFR            59           

                           

                    2016                                                  

Result 

Comment: The eGFR is calculated using the CKD-EPI formula. In most young, 
healthy individuals the eGFR will be >90 mL/min/1.73m2. The eGFR declines with 
age. An eGFR of 60-89 may be normal in some populations, particularly the 
elderly, for whom the CKD-EPI formula has not been extensively validated. Use of
 the eGFR is not recommended in the following populations:<br/><br/>Individuals 
with unstable creatinine concentrations, including pregnant patients and those 
with serious co-morbid conditions.<br/><br/>Patients with extremes in muscle 
mass or diet. <br/><br/>The data above are obtained from the National Kidney 
Disease Education Program (NKDEP) which additionally recommends that when the 
eGFR is used in patients with extremes of body mass index for purposes of drug 
dosing, the eGFR should be multiplied by the estimated BMI.                     
                                        [facility]                    

 

                CHEM PANEL                         POC Creatinine  0.9          

               

0.5 - 1.4                         2016                                    

                                                    [facility]                

    

 

                CHEM PANEL                         Lactic Acid Lvl 2.1          

               

0.5 - 2.2                         2016                                    

                                                    [facility]                

    

 

                ELECTROLYTES                         AGAP            9.5        

                 10.0 - 

20.0                         2016                                         

                                                    [facility]                

    

 

                ELECTROLYTES                         eGFR            62         

                           

                    2016                                                  

Result

 Comment: The eGFR is calculated using the CKD-EPI formula. In most young, 
healthy individuals the eGFR will be >90 mL/min/1.73m2. The eGFR declines with 
age. An eGFR of 60-89 may be normal in some populations, particularly the 
elderly, for whom the CKD-EPI formula has not been extensively validated. Use of
 the eGFR is not recommended in the following populations:<br/><br/>Individuals 
with unstable creatinine concentrations, including pregnant patients and those 
with serious co-morbid conditions.<br/><br/>Patients with extremes in muscle 
mass or diet. <br/><br/>The data above are obtained from the National Kidney 
Disease Education Program (NKDEP) which additionally recommends that when the 
eGFR is used in patients with extremes of body mass index for purposes of drug 
dosing, the eGFR should be multiplied by the estimated BMI.                     
                                        [facility]                    

 

                ELECTROLYTES                         Glucose Lvl     131        

                 70 

- 99                         2016                                         

                                                    [facility]                

    

 

                ELECTROLYTES                         BUN             21         

                7 - 22      

                    2016                                                  

 

                                        [facility]                    

 

                ELECTROLYTES                         Sodium Lvl      140        

                 135 

- 145                         2016                                        

                                                    [facility]                

    

 

                    ELECTROLYTES                         Creatinine Lvl      0.8

7                        

                    0.50 - 1.40                         2016              

                   

                                                    [facility]                

    

 

                ELECTROLYTES                         CO2             27         

                24 - 32     

                    2016                                                  

                                        [facility]                    

 

                ELECTROLYTES                         Chloride Lvl    107        

                 95

 - 109                         2016                                       

                                                    [facility]                

    

 

                ELECTROLYTES                         Potassium Lvl   3.5        

                 

3.5 - 5.1                         2016                                    

                                                    [facility]                

    

 

                ELECTROLYTES                         Calcium Lvl     8.4        

                 8.5

 - 10.5                         2016                                      

                                                    [facility]                

    

 

                HEMATOLOGY                         PTT             31.9         

                22.9 - 35.8 

                          2016                                            

  

                                                    [facility]                         INR             2.33         

                0.85 - 1.17 

                          2016                                            

  

                                                    [facility]                         PT              25.9         

                12.0 - 14.7  

                          2016                                            

   

                                                    [facility]                         MCHC            33.1         

                32.0 - 36.0

                          2016                                            

 

                                                    [facility]                         RDW             12.8         

                11.5 - 14.5 

                          2016                                            

  

                                                    [facility]                         Platelet        183          

               133 - 

450                         2016                                          

                                                    [facility]                         MPV             10.8         

                7.4 - 10.4  

                          2016                                            

   

                                                    [facility]                         WBC             14.1         

                3.7 - 10.4  

                          2016                                            

   

                                                    [facility]                         Hgb             10.9         

                12.0 - 16.0 

                          2016                                            

  

                                                    [facility]                         Hct             32.9         

                36.0 - 48.0 

                          2016                                            

  

                                                    [facility]                         MCH             29.5         

                27.0 - 31.0 

                          2016                                            

  

                                                    [facility]                

    

 

                HEMATOLOGY                         MCV             89.0         

                80.0 - 98.0 

                          2016                                            

  

                                                    [facility]                

    

 

                HEMATOLOGY                         RBC             3.70         

                4.20 - 5.40 

                          2016                                            

  

                                                    [facility]                

    

 

                HEMATOLOGY                         Lymphocytes     4.7          

               20.0 

- 40.0                         2016                                       

                                                    [facility]                

    

 

                HEMATOLOGY                         Segs            87.1         

                45.0 - 75.0

                          2016                                            

 

                                                    [facility]                

    

 

                HEMATOLOGY                         Lymphocytes #   0.7          

               1.0

 - 5.5                         2016                                       

                                                    [facility]                

    

 

                HEMATOLOGY                         Monocytes #     1.0          

               0.0 -

 0.8                         2016                                         

                                                    [facility]                

    

 

                HEMATOLOGY                         Segs-Bands #    12.3         

                1.5

 - 8.1                         2016                                       

                                                    [facility]                

    

 

                HEMATOLOGY                         Basophils #     0.1          

               0.0 -

 0.2                         2016                                         

                                                    [facility]                

    

 

                HEMATOLOGY                         Basophils       0.6          

               0.0 - 

1.0                         2016                                          

                                                    [facility]                

    

 

                HEMATOLOGY                         Monocytes       6.8          

               2.0 - 

12.0                         2016                                         

                                                    [facility]                

    

 

                HEMATOLOGY                         Eosinophils     0.8          

               0.0 -

 4.0                         2016                                         

                                                    [facility]                

    

 

                HEMATOLOGY                         Eosinophils #   0.1          

               0.0

 - 0.5                         2016                                       

                                                    [facility]                

    

 

                    URINE AND STOOL                         UA Urobilinogen     

<=1.0 mg/dL             

                          0.1 - 1.0                         2016          

              

                                                                      [facility]                 

   

 

                    URINE AND STOOL                         UA Color            

Ltyellow                       

                                             2016                         

                  

                                                    [facility]                

    

 

                    URINE AND STOOL                         UA Ketones          

Negative mg/dL               

                          Negative mg/dL                         2016     

                

                                                                      [facility]              

      

 

                    URINE AND STOOL                         UA Bili             

Negative 

*NA*

                    (16 8:29 AM)                         Negative          

               

2016                                                                      

 

                                        [facility]                    

 

                    URINE AND STOOL                         UA Blood            

Moderate 

*ABN*

                    (16 8:29 AM)                         Negative          

               

2016                                                                      

 

                                        [facility]                    

 

                    URINE AND STOOL                         UA Protein          

100 mg/dL                    

                    Negative mg/dL                         2016           

               

                                                    [facility]                

   

 

 

                    URINE AND STOOL                         UA Glucose          

Negative mg/dL               

                          Negative mg/dL                         2016     

                

                                                                      [facility]

st              

      

 

                    URINE AND STOOL                         UA Spec Grav        

1.012                      

                    <=1.030                         2016                  

                 

                                                    [facility]                

    

 

                URINE AND STOOL                         UA pH           5.0     

                    5.0 - 

8.0                         2016                                          

                                                    [facility]                

    

 

                    URINE AND STOOL                         UA Turbidity        

Clear 

                    (16 8:29 AM)                         Clear             

            

2016                                                                      

 

                                        [facility]                    

 

                URINE AND STOOL                         UA RBC          7       

                  0 - 2  

                          2016                                            

   

                                                     [facility]                

    

 

                    URINE AND STOOL                         UA Bacteria         

Occasional /HPF             

                          None Seen /HPF                         2016     

              

                                                                      [facility]            

        

 

                    URINE AND STOOL                         UA Sq Epi           

Occasional /LPF               

                    Few /LPF                         2016                 

          

                                                    [facility]                

    

 

                URINE AND STOOL                         UA WBC          163     

                    0 - 5

                          2016                                            

 

                                                    [facility]                

    

 

                    URINE AND STOOL                         UA Nitrite          

Negative 

                    (16 8:29 AM)                         Negative          

               

2016                                                                      

 

                                        [facility]                    

 

                    URINE AND STOOL                         UA Leuk Est         

Moderate 

*ABN*

                    (16 8:29 AM)                         Negative          

               

2016                                                                      

 

                                        [facility]                    

 

                CHEMISTRY                         CO2             25.0          

               24 - 32      

                          11/15/2011                         Normal             

       

                                                    [facility]                

    

 

                CHEMISTRY                         Calcium Lvl     9.2           

              8.5 - 

10.5                         11/15/2011                         Normal          

                                                    [facility]                

    

 

                CHEMISTRY                         Sodium Lvl      142.0         

                135 -

 145                         11/15/2011                         Normal          

                                                    [facility]                

    

 

                CHEMISTRY                         Creatinine Lvl  0.9           

              0.5

 - 1.4                         11/15/2011                         Normal        

                                                    [facility]                

    

 

                CHEMISTRY                         Potassium Lvl   3.8           

              3.5 

- 5.1                         11/15/2011                         Normal         

                                                    [facility]                

    

 

                CHEMISTRY                         Chloride Lvl    106.0         

                95 

- 109                         11/15/2011                         Normal         

                                                    [facility]                

    

 

                CHEMISTRY                         BUN             22.0          

               7 - 22       

                          11/15/2011                         Normal             

        

                                                    [facility]                

    

 

                CHEMISTRY                         Glucose Lvl     104.0         

                    

                          11/15/2011                         NA                 

     

                                        <sup>2</sup>Interpretive Data: Reference

 Ranges :      0 - 7 days  : 41 - 90 

mg/dL 7 days - 150 yrs : 70 - 99 mg/dL (fasting), based on the clinical 
recommendations of the [employer] Diabetes [employer].                         [facility]                    

 

                CHEMISTRY                         AGAP            14.8          

               10.0 - 20.0 

                          11/15/2011                         Normal             

  

                                                    [facility]                

    

 

                HEMATOLOGY                         Basophils #     0.0          

               0.0 -

 0.2                         11/15/2011                         Normal          

                                                    [facility]                

    

 

                HEMATOLOGY                         Monocytes #     0.5          

               0.0 -

 0.8                         11/15/2011                         Normal          

                                                    [facility]                

    

 

                HEMATOLOGY                         Eosinophils #   0.1          

               0.0

 - 0.5                         11/15/2011                         Normal        

                                                    [facility]                

    

 

                HEMATOLOGY                         Lymphocytes #   1.3          

               1.0

 - 5.5                         11/15/2011                         Normal        

                                                    [facility]                

    

 

                HEMATOLOGY                         Segs-Bands #    2.7          

               1.5 

- 8.1                         11/15/2011                         Normal         

                                                    [facility]                

    

 

                HEMATOLOGY                         Basophils       0.5          

               0.0 - 

1.0                         11/15/2011                         Normal           

                                                    [facility]                

    

 

                HEMATOLOGY                         Eosinophils     2.1          

               0.0 -

 4.0                         11/15/2011                         Normal          

                                                    [facility]                

    

 

                HEMATOLOGY                         Monocytes       10.1         

                2.0 - 

12.0                         11/15/2011                         Normal          

                                                    [facility]                

    

 

                HEMATOLOGY                         Lymphocytes     29.1         

                20.0

 - 40.0                         11/15/2011                         Normal       

                                                    [facility]                

    

 

                HEMATOLOGY                         Segs            58.2         

                45.0 - 75.0

                          11/15/2011                         Normal             

 

                                                    [facility]                

    

 

                HEMATOLOGY                         MPV             10.9         

                7.4 - 10.4  

                          11/15/2011                         HI                 

   

                                                    [facility]                

    

 

                HEMATOLOGY                         Platelet        204.0        

                 133 - 

450                         11/15/2011                         Normal           

                                                    [facility]                

    

 

                HEMATOLOGY                         RDW             12.7         

                11.5 - 14.5 

                          11/15/2011                         Normal             

  

                                                    [facility]                         MCHC            36.3         

                32.0 - 36.0

                          11/15/2011                         HI                 

 

                                                    [facility]                

    

 

                HEMATOLOGY                         MCH             32.5         

                27.0 - 31.0 

                          11/15/2011                         [facility]                

    

 

                HEMATOLOGY                         MCV             89.4         

                81.0 - 99.0 

                          11/15/2011                         Normal             

  

                                                    [facility]                         Hgb             11.0         

                12.0 - 16.0 

                          11/15/2011                         LOW                

  

                                                    [facility]                

    

 

                HEMATOLOGY                         Hct             30.3         

                36.0 - 48.0 

                          11/15/2011                         LOW                

  

                                                    [facility]                

    

 

                HEMATOLOGY                         WBC             4.6          

               3.7 - 10.4   

                          11/15/2011                         Normal             

    

                                                    [facility]                         RBC             3.39         

                4.20 - 5.40 

                          11/15/2011                         LOW                

  

                                                    [facility]                         INR             1.04         

                0.85 - 1.17 

                          11/15/2011                         Normal             

  

                                        <sup>6</sup>Interpretive Data: RECOMMEND

ED RANGES FOR PROTIME INR:    

2.0-3.0 for most medical and surgical thromboembolic states.    2.5-3.5 for 
artificial heart valves and recurrent embolism.  INR SHOULD BE USED ONLY FOR 
PATIENTS ON STABLE ANTICOAGULANT THERAPY.                         [facility]  

                  

 

                HEMATOLOGY                         PT              13.6         

                12.0 - 14.7  

                          11/15/2011                         Normal             

   

                                                    [facility]                         PT              13.7         

                12.0 - 14.7  

                          2011                         Normal             

   

                                                    [facility]                

    

 

                HEMATOLOGY                         INR             1.05         

                0.85 - 1.17 

                          2011                         Normal             

  

                                        <sup>7</sup>Interpretive Data: RECOMMEND

ED RANGES FOR PROTIME INR:    

2.0-3.0 for most medical and surgical thromboembolic states.    2.5-3.5 for 
artificial heart valves and recurrent embolism.  INR SHOULD BE USED ONLY FOR 
PATIENTS ON STABLE ANTICOAGULANT THERAPY.                         [facility]  

                  

 

                HEMATOLOGY                         Basophils       0.5          

               0.0 - 

1.0                         2011                         Normal           

                                                    [facility]                

    

 

                HEMATOLOGY                         Monocytes       9.3          

               2.0 - 

12.0                         2011                         Normal          

                                                    [facility]                         Segs-Bands #    2.6          

               1.5 

- 8.1                         2011                         Normal         

                                                    [facility]                

    

 

                HEMATOLOGY                         Lymphocytes #   2.1          

               1.0

 - 5.5                         2011                         Normal        

                                                     [facility]                

    

 

                HEMATOLOGY                         Eosinophils     2.7          

               0.0 -

 4.0                         2011                         Normal          

                                                     [facility]                

    

 

                HEMATOLOGY                         Lymphocytes     38.8         

                20.0

 - 40.0                         2011                         Normal       

                                                     [facility]                

    

 

                HEMATOLOGY                         Segs            48.7         

                45.0 - 75.0

                          2011                         Normal             

 

                                                     [facility]                

    

 

                HEMATOLOGY                         Basophils #     0.0          

               0.0 -

 0.2                         2011                         Normal          

                                                     [facility]                

    

 

                HEMATOLOGY                         Eosinophils #   0.1          

               0.0

 - 0.5                         2011                         Normal        

                                                     [facility]                

    

 

                HEMATOLOGY                         Monocytes #     0.5          

               0.0 -

 0.8                         2011                         Normal          

                                                     [facility]                

    

 

                HEMATOLOGY                         MCHC            35.5         

                32.0 - 36.0

                          2011                         Normal             

 

                                                     [facility]                

    

 

                HEMATOLOGY                         Platelet        202.0        

                 133 - 

450                         2011                         Normal           

                                                     [facility]                

    

 

                HEMATOLOGY                         MPV             11.1         

                7.4 - 10.4  

                          2011                         HI                 

   

                                                     [facility]                

    

 

                HEMATOLOGY                         Hct             31.3         

                36.0 - 48.0 

                          2011                         LOW                

  

                                                     [facility]                

    

 

                HEMATOLOGY                         MCH             32.3         

                27.0 - 31.0 

                          2011                         HI                 

  

                                                     [facility]                

    

 

                HEMATOLOGY                         RDW             12.8         

                11.5 - 14.5 

                          2011                         Normal             

  

                                                     [facility]                

    

 

                HEMATOLOGY                         MCV             91.2         

                81.0 - 99.0 

                          2011                         Normal             

  

                                                     [facility]                

    

 

                HEMATOLOGY                         WBC             5.4          

               3.7 - 10.4   

                          2011                         Normal             

    

                                                     [facility]                

    

 

                HEMATOLOGY                         Hgb             11.1         

                12.0 - 16.0 

                          2011                         LOW                

  

                                                    [facility]                

    

 

                HEMATOLOGY                         RBC             3.43         

                4.20 - 5.40 

                          2011                         LOW                

  

                                                     [facility]                

    

 

                CHEMISTRY                         Total CK        75.0          

               12 - 191

                          2011                         Normal             

 

                                                     [facility]                

    

 

                CHEMISTRY                         Troponin-I      0.02          

               0.00 -

 0.40                         2011                         Normal         

                                                     [facility]                

    

 

                CHEMISTRY                         Magnesium Lvl   2.4           

              1.8 

- 2.4                         2011                         Normal         

                                                     [facility]                

    

 

                CHEMISTRY                         Phosphorus      4.0           

              2.5 - 

4.5                         2011                         Normal           

                                                     [facility]                

    

 

                CHEMISTRY                         Albumin Lvl     3.8           

              3.5 - 

5.0                         2011                         Normal           

                                                     [facility]                

    

 

                CHEMISTRY                         ALT             24.0          

               0 - 65       

                          2011                         Normal             

        

                                                     [facility]                

    

 

                CHEMISTRY                         Calcium Lvl     9.6           

              8.5 - 

10.5                         2011                         Normal          

                                                     [facility]                

    

 

                CHEMISTRY                         Alk Phos        38.0          

               39 - 136

                          2011                         LOW                

 

                                                    [facility]                

    

 

                CHEMISTRY                         Bili Total      0.4           

              0.2 - 

1.3                         2011                         Normal           

                                                    [facility]                

    

 

                CHEMISTRY                         AST             15.0          

               0 - 37       

                          2011                         Normal             

        

                                                    [facility]                

    

 

                CHEMISTRY                         BUN             23.0          

               7 - 22       

                    2011                         [facility]                    

 

                CHEMISTRY                         Creatinine Lvl  0.9           

              0.5

 - 1.4                         2011                         Normal        

                                                     [facility]                

    

 

                CHEMISTRY                         Sodium Lvl      145.0         

                135 -

 145                         2011                         Normal          

                                                     [facility]                

    

 

                CHEMISTRY                         Potassium Lvl   4.3           

              3.5 

- 5.1                         2011                         Normal         

                                                     [facility]                

    

 

                CHEMISTRY                         Chloride Lvl    111.0         

                95 

- 109                         2011                         [facility]                

    

 

                CHEMISTRY                         CO2             26.0          

               24 - 32      

                          2011                         Normal             

       

                                                    [facility]                

    

 

                CHEMISTRY                         Total Protein   7.5           

              6.4 

- 8.4                         2011                         Normal         

                                                    [facility]                

    

 

                CHEMISTRY                         Glucose Lvl     106.0         

                    

                          2011                         NA                 

     

                                        <sup>3</sup>Interpretive Data: Reference

 Ranges :      0 - 7 days  : 41 - 90 

mg/dL 7 days - 150 yrs : 70 - 99 mg/dL (fasting), based on the clinical 
recommendations of the American Diabetes Association.                         

 [facility]                    

 

                CHEMISTRY                         B/C Ratio       26.0          

               6 - 25 

                          2011                         [facility]                

    

 

                CHEMISTRY                         AGAP            12.3          

               10.0 - 20.0 

                          2011                         Normal             

  

                                                    [facility]                

    

 

                CHEMISTRY                         A/G Ratio       1.0           

              0.7 - 

1.6                         2011                         Normal           

                                                    [facility]                

    

 

                CHEMISTRY                         Globulin        3.7           

              2.0 - 4.0

                          2011                         Normal             

 

                                                    [facility]                

    

 

                CHEMISTRY                         TSH             1.96          

               0.360 - 3.740

                          2011                         Normal             

 

                                                    [facility]                

    

 

                HEMATOLOGY                         MCV             90.6         

                81.0 - 99.0 

                          2011                         Normal             

  

                                                    [facility]                

    

 

                HEMATOLOGY                         Hct             31.1         

                36.0 - 48.0 

                          2011                         LOW                

  

                                                    [facility]                

    

 

                HEMATOLOGY                         RBC             3.44         

                4.20 - 5.40 

                          2011                         LOW                

  

                                                    [facility]                

    

 

                HEMATOLOGY                         Hgb             11.1         

                12.0 - 16.0 

                          2011                         LOW                

  

                                                    [facility]                

    

 

                HEMATOLOGY                         MPV             10.8         

                7.4 - 10.4  

                          2011                         [facility]                

    

 

                HEMATOLOGY                         Platelet        209.0        

                 133 - 

450                         2011                         Normal           

                                                    [facility]                

    

 

                HEMATOLOGY                         MCH             32.2         

                27.0 - 31.0 

                          2011                         [facility]                

    

 

                HEMATOLOGY                         RDW             13.1         

                11.5 - 14.5 

                          2011                         Normal             

  

                                                    [facility]                

    

 

                HEMATOLOGY                         MCHC            35.6         

                32.0 - 36.0

                          2011                         Normal             

 

                                                    [facility]                

    

 

                HEMATOLOGY                         WBC             5.1          

               3.7 - 10.4   

                          2011                         Normal             

    

                                                    [facility]                

    

 

                HEMATOLOGY                         Eosinophils #   0.1          

               0.0

 - 0.5                         2011                         Normal        

                                                    [facility]                

    

 

                HEMATOLOGY                         Eosinophils     2.0          

               0.0 -

 4.0                         2011                         Normal          

                                                    [facility]                

    

 

                HEMATOLOGY                         Basophils #     0.0          

               0.0 -

 0.2                         2011                         Normal          

                                                    [facility]                

    

 

                HEMATOLOGY                         Lymphocytes #   1.8          

               1.0

 - 5.5                         2011                         Normal        

                                                    [facility]                

    

 

                HEMATOLOGY                         Segs-Bands #    2.6          

               1.5 

- 8.1                         2011                         Normal         

                                                    [facility]                

    

 

                HEMATOLOGY                         Monocytes #     0.5          

               0.0 -

 0.8                         2011                         Normal          

                                                    [facility]                

    

 

                HEMATOLOGY                         Basophils       0.4          

               0.0 - 

1.0                         2011                         Normal           

                                                    [facility]                         Lymphocytes     35.7         

                20.0

 - 40.0                         2011                         Normal       

                                                    [facility]                

    

 

                HEMATOLOGY                         Monocytes       10.5         

                2.0 - 

12.0                         2011                         Normal          

                                                    [facility]                

    

 

                HEMATOLOGY                         Segs            51.4         

                45.0 - 75.0

                          2011                         Normal             

 

                                                    [facility]                

    

 

                    BACTERIAL - SEROLOGY                         MRSA by PCR    

     Negative 1

                    (2011 03:30:00) ??                                    

              

2011                         Normal                         

<sup>1</sup>Interpretive Data: INTERPRETATION:     Negative......No MRSA DNA 
detected by PCR      Positive......MRSA DNA detected by PCR  ASSAY LIMITATIONS: 
 This is a screening test for colonization by MRSA. A positive  test result 
indicates the patient is colonized by MRSA, but  does not necessarily mean that 
an infection is present or that  treatment is necessary.  Likewise, a negative 
test does not  exclude colonization or infection.  Patients should be evaluated 
clinically for symptoms and signs of infection before making  therapeutic 
decisions.  Routine decolonization is discouraged  and should only be considered
 for select patients after  consultation with an infectious diseases specialist.
                                        [facility]                    

 

                CHEMISTRY                         BNP             126.0         

                <<=100      

                          2011                         [place]                 

       

                                        <sup>5</sup>Interpretive Data: Elevated 

results are in line with increasing 

severity of  congestive heart failure. Minor elevations between 100 and 300  may
 be seen with Myocardial Ischemia, Sodium retaining drugs,  and 
compensated/treated heart failure.                         [facility]         

           

 

                CHEMISTRY                         Phosphorus      3.1           

              2.5 - 

4.5                         2011                         Normal           

                                                    [facility]                

    

 

                CHEMISTRY                         Magnesium Lvl   1.5           

              1.8 

- 2.4                         2011                         LOW            

                                                     [facility]                

    

 

                CHEMISTRY                         Troponin-I      <0.02         

                0.00 

- 0.40                         2011                         Normal        

                                                    [facility]                

    

 

                CHEMISTRY                         CK MB           1.1           

              0.5 - 3.6   

                          2011                         Normal             

    

                                                     [facility]                

    

 

                CHEMISTRY                         Total CK        110.0         

                12 - 

191                         2011                         Normal           

                                                    [facility]                

    

 

                CHEMISTRY                         Bili Total      0.4           

              0.2 - 

1.3                         2011                         Normal           

                                                     [facility]                

    

 

                CHEMISTRY                         Alk Phos        44.0          

               39 - 136

                          2011                         Normal             

 

                                                     [facility]                

    

 

                CHEMISTRY                         ALT             30.0          

               0 - 65       

                          2011                         Normal             

        

                                                     [facility]                

    

 

                CHEMISTRY                         Albumin Lvl     4.1           

              3.5 - 

5.0                         2011                         Normal           

                                                     [facility]                

    

 

                CHEMISTRY                         AST             27.0          

               0 - 37       

                          2011                         Normal             

        

                                                     [facility]                

    

 

                CHEMISTRY                         A/G Ratio       1.0           

              0.7 - 

1.6                         2011                         Normal           

                                                     [facility]                

    

 

                CHEMISTRY                         Globulin        4.0           

              2.0 - 4.0

                          2011                         Normal             

 

                                                     [facility]                

    

 

                CHEMISTRY                         B/C Ratio       24.0          

               6 - 25 

                          2011                         Normal             

  

                                                     [facility]                

    

 

                CHEMISTRY                         AGAP            18.4          

               10.0 - 20.0 

                          2011                         Normal             

  

                                                     [facility]                

    

 

                CHEMISTRY                         Chloride Lvl    105.0         

                95 

- 109                         2011                         Normal         

                                                     [facility]                

    

 

                CHEMISTRY                         Potassium Lvl   3.4           

              3.5 

- 5.1                         2011                         LOW            

                                                    [facility]                

    

 

                CHEMISTRY                         Calcium Lvl     9.5           

              8.5 - 

10.5                         2011                         Normal          

                                                    [facility]                

    

 

                CHEMISTRY                         CO2             22.0          

               24 - 32      

                          2011                         LOW                

       

                                                    [facility]                

    

 

                CHEMISTRY                         Total Protein   8.1           

              6.4 

- 8.4                         2011                         Normal         

                                                     [facility]                

    

 

                CHEMISTRY                         Sodium Lvl      142.0         

                135 -

 145                         2011                         Normal          

                                                     [facility]                

    

 

                CHEMISTRY                         Glucose Lvl     141.0         

                    

                          2011                         NA                 

     

                                        <sup>4</sup>Interpretive Data: Reference

 Ranges :      0 - 7 days  : 41 - 90 

mg/dL 7 days - 150 yrs : 70 - 99 mg/dL (fasting), based on the clinical 
recommendations of the American Diabetes Association.                         

 [facility]                    

 

                CHEMISTRY                         Creatinine Lvl  1.0           

              0.5

 - 1.4                         2011                         Normal        

                                                    [facility]                

    

 

                CHEMISTRY                         BUN             24.0          

               7 - 22       

                    2011                         [place]                       

  

                                         [facility]                    

 

                CHEMISTRY                         CK MB Index     1.0           

              0.0 - 

2.5                         2011                         Normal           

                                                    [facility]                         PTT             25.7         

                22.9 - 35.8 

                          2011                         Normal             

  

                                        <sup>9</sup>Interpretive Data: Heparin T

herapeutic Range:  57 - 92 

Seconds                                 [facility]                    

 

                HEMATOLOGY                         PT              13.0         

                12.0 - 14.7  

                          2011                         Normal             

   

                                                    [facility]                         INR             0.98         

                0.85 - 1.17 

                          2011                         Normal             

  

                                        <sup>8</sup>Interpretive Data: RECOMMEND

ED RANGES FOR PROTIME INR:    

2.0-3.0 for most medical and surgical thromboembolic states.    2.5-3.5 for 
artificial heart valves and recurrent embolism.  INR SHOULD BE USED ONLY FOR 
PATIENTS ON STABLE ANTICOAGULANT THERAPY.                         [facility]  

                  



                                                                                
                                                                                
                                                                                
                                                                                
                                                                                
                                                                                
                                                                                
                                                                                
                                                                                
                                                                                
                                                                                
                                                                                
                                                                                
                                                                                
                                                                                
                                                                                
                                                                                
                                                                                
                                                                                
                                                                                
                                                                                
                                                                                
                                                                                
                                                                                
                                                                                
                                                                                
                                                                                
                                                                                
                                                                                
                                                                                
                                                                                
                                                                                
                                                                                
                                                                                
                                                                                
                                                                                
                                                                                
                                                                                
                                                                                
                                                                                
                                                                                
                                                                                
                                                                                
                                                                                
                                                                                
                                                                                
                                                                                
                                                                                
                                                                                
                                                                                
                                                                                
                                                                                
                                                                                
                                                                                
                                                                                
                                                                                
                                                                                
                                                                                
                                                                                
                                                                                
                                                                                
                                                                                
                                                                                
                                                                                
                                                                                
                                                                                
                                                                                
                                                                                
                                                                                
                                                                                
                                                                                
                                                                                
                                                                                
                                                                                
                                                                                
                                                                                
                                                                                
                                                                                
                                                                                
                                                                                
                                                                                
                                                                                
                               



Pathology Reports





                                        No Data Provided for This Section       

             



                                                



Diagnostic Reports

                    



                    Report                         Value                        

 Date               

                                        Source                    

 

                          Chest 1view DX                         PROCEDURE INFOR

MATION: 

Exam: XR Chest, 1 View 

Exam date and time: 10/30/2020 6:01 PM 

Age: 87 years old 

Clinical indication: /weakness 

TECHNIQUE: 

Imaging protocol: XR of the chest 

Views: 1 view. 

COMPARISON: 

CHEST 2 VIEWS DX 10/18/2020 9:19 AM 

FINDINGS: 

Tubes, catheters and devices: Left-sided dual-chamber pacemaker is stable. 

Lungs: Emphysematous changes of the lungs are seen. No consolidation is noted. 

Pleural space: Unremarkable. No pleural effusion. No pneumothorax. 

Heart/Mediastinum: Heart size is within normal limits. Vasculature is 

unremarkable. 

Bones/joints: Osseous structures are stable. 

IMPRESSION: 

No acute cardiopulmonary disease. 

Colton Hui MD On 10/30/2020  18:10:57; [id number]

                          10/30/2020                         [facility]       

 

            

 

                          Chest 2 views DX                         PROCEDURE INF

ORMATION: 

Exam: XR Chest, 2 Views 

Exam date and time: 10/18/2020 9:19 AM 

Age: 87 years old 

Clinical indication: /follow up on left pneumothorax; () 

TECHNIQUE: 

Imaging protocol: XR of the chest 

Views: 2 views. 

COMPARISON: 

CHEST 2 VIEWS DX 10/17/2020 9:50 AM 

FINDINGS: 

Tubes, catheters and devices: Customary positioning of pacemaker/cardiac device 

leads. 

Lungs: Otherwise no new consolidation. 

Pleural space: Stable faint pleural line at the left lung apex, not changed. 

Heart/Mediastinum: Unremarkable. No cardiomegaly. 

Bones/joints: Unremarkable. 

IMPRESSION: 

                                        1. Stable faint left apical pleural line

, suspicious for small pneumothorax. 

This has not increased in size. 

                                        2. Otherwise no change. No new consolida

tion. 

Bret Shaffer MD On 10/18/2020  09:33:05; [id number]

                          10/18/2020                         [facility] 2 views DX                         PROCEDURE INF

ORMATION:

  Exam:  XR Chest, 2 Views

  Exam date and time:  10/17/2020 9:50 AM

  Age:  87 years old

  Clinical indication:  Device placement; Cardiac pacemaker placement or 

adjustment; Additional info: Line placement/status post ppm/icd implantation

TECHNIQUE:

  Imaging protocol:  XR of the chest

Views: 2 views.

COMPARISON:  CHEST 1V FOR PLACEMENT DX 10/16/2020 11:30 AM

FINDINGS: 

Tubes, catheters and devices: Left subclavian pacemaker again seen and stable 

in position. 

Lungs: Stable lung volumes. No confluent reticular opacities. Small calcified 

granulomas suspected in the left lung.

Pleural space: No significant pleural effusion. Left-sided pneumothorax is 

present and estimated at 15% in size. There is no right-sided pneumothorax.

Heart/Mediastinum: Heart and mediastinal contours are stable. 

Bones/joints: Stable in appearance. 

IMPRESSION: 

Left-sided pneumothorax now visualized estimated at 15%.

Findings discussed with Dr. Stevenson at the time of dictation.

Good Knox MD On 10/17/2020  15:10:33; [id number]

                          10/17/2020                         [facility] 1 v for Placement DX                         PRO

CEDURE INFORMATION: 

Exam: XR Chest, 1 View 

Exam date and time: 10/16/2020 11:30 AM 

Age: 87 years old 

Clinical indication: Line placement/status post ppm/icd implantation 

TECHNIQUE: 

Imaging protocol: XR of the chest 

Views: 1 view. 

COMPARISON: 

CHEST 1VIEW DX 2020 10:52 AM 

FINDINGS:  Left chest wall pacemaker obscures portions of this exam.  Catheter 

overlying the right mid thorax is of indeterminate origin.

Lungs: Mild decreased lung volumes. No consolidation. 

Pleural space: Unremarkable. No pleural effusion. No pneumothorax. 

Heart/Mediastinum:  Cardiac silhouette size shows minimal enlargement. 

Vasculature is unremarkable. 

Bones/joints: Unremarkable. 

IMPRESSION: 

Postprocedural pacemaker placement without visible pneumothorax. 

Zack Guerra MD On 10/16/2020  11:57:53; [id number]

                          10/16/2020                         [facility] 1view DX                         PROCEDURE INFOR

MATION: 

Exam: XR Chest, 1 View 

Exam date and time: 2020 10:52 AM 

Age: 87 years old 

Clinical indication: Other: Palpitations; Additional info: /palpitations () 

TECHNIQUE: 

Imaging protocol: XR of the chest 

Views: 1 view. 

COMPARISON: 

CHEST 1VIEW DX 5/15/2020 6:36 AM 

FINDINGS: 

Tubes, catheters and devices: EKG leads overlie the chest. 

Lungs: There are patchy interstitial changes. There is an indeterminate nodular 

opacity at the right mid to basilar lung zone. No lobar consolidation. 

Pleural space: No pleural effusion. No pneumothorax. 

Heart/Mediastinum: The cardiac silhouette is enlarged. There is a coronary 

stent in the left coronary distribution. 

Vasculature: Atheromatous changes are present in the aorta. 

Bones/joints: There are degenerative changes at the shoulders. There are 

osteophytes of the thoracolumbar spine. Post treatment changes of cement 

augmentation visible at the inferior collimation of the radiograph. 

IMPRESSION: 

                                        1. Indeterminate nodular density at the 

right mid to basilar lung zone. 

                                        2. Patchy interstitial changes favored t

o be related to scarring or senescent 

change. 

Aurea Coreas MD On 2020  11:18:15; [id number]

                          2020                         [facility] 1view DX                         PROCEDURE INFOR

MATION: 

Exam: XR Chest, 1 View 

Exam date and time: 5/15/2020 6:36 AM 

Age: 87 years old 

Clinical indication: /cp, SOB 

TECHNIQUE: 

Imaging protocol: XR of the chest 

Views: 1 view. 

COMPARISON: 

CHEST 1VIEW DX 2020 1:51 PM 

FINDINGS: 

Lungs: Mild decreased lung volumes. No consolidation. Bibasilar atelectasis.

Pleural space: Unremarkable. No pleural effusion. No pneumothorax. 

Heart/Mediastinum: Heart size is within normal limits. Atherosclerotic 

calcifications. 

Bones/joints: Unremarkable. Degenerative changes of the thoracic spine.

IMPRESSION: 

No acute cardiopulmonary findings. 

Evaristo Weaver MD On 05/15/2020  07:56:42; [id number]

                          05/15/2020                         [facility]       

 

            

 

                          Brain wo contrast CT                         Radiation

 Dose CTDIVOL = 0 (mGy): 

DLP = 977.84 (mGy-cm)

PROCEDURE INFORMATION: 

Exam: CT Head Without Contrast 

Exam date and time: 2020 6:47 PM 

Age: 87 years old 

Clinical indication: /ams 

TECHNIQUE: 

Imaging protocol: Computed tomography of the head without contrast. 

Total DLP: 977.84 mGy-cm 

Radiation optimization: All CT scans at this facility use at least one of these 

dose optimization techniques: automated exposure control; mA and/or kV 

adjustment per patient size (includes targeted exams where dose is matched to 

clinical indication); or iterative reconstruction. 

COMPARISON: 

No relevant prior studies available. 

FINDINGS: 

Brain: There is mild atrophy. No acute infarct or intracranial hemorrhage is 

present. No significant white matter disease.

Ventricles: Normal. No ventriculomegaly. 

Bones/joints: Unremarkable. No acute fracture. 

Sinuses: Visualized sinuses are unremarkable. No fluid levels. 

Mastoid air cells: Visualized mastoid air cells are well aerated. 

Soft tissues: Unremarkable. 

Vasculature: Distal internal carotid arterial calcifications. 

IMPRESSION: 

No acute intracranial abnormality.

Dg Kahn MD On 2020  19:14:55; [id number]

                          2020                         [facility] 1view DX                         PROCEDURE INFOR

MATION: 

Exam: XR Chest, 1 View 

Exam date and time: 2020 1:51 PM 

Age: 87 years old 

Clinical indication: Weakness 

TECHNIQUE: 

Imaging protocol: XR of the chest 

Views: 1 view. 

COMPARISON: 

No relevant prior studies available. 

FINDINGS: 

Lungs: No focal infiltrate identified within the lungs and no edema. 

Pleural space: No pleural effusions and no pneumothorax. 

Heart/Mediastinum: Mild enlargement of the cardiac silhouette. Thoracic aorta 

is mildly tortuous. Mediastinal structures otherwise unremarkable. 

Bones/joints: Degenerative changes are seen about the thoracic and visualized 

cervical spine. Degenerative changes are seen about both glenohumeral joints. 

Decreased bilateral acromiohumeral space suggests changes of chronic bilateral 

rotator cuff tears. 

IMPRESSION: 

                                        1. Mild enlargement of the cardiac silho

uette.

                                        2. No focal infiltrate within the lungs.

 

Rehan Pérez MD On 2020  14:55:07; [id number]

                          2020                         [facility] 1view DX                         PROCEDURE INFOR

MATION: 

Exam: XR Chest, 1 View 

Exam date and time: 2019 8:00 PM 

Age: 87 years old 

Clinical indication: Chest pain; Additional info: /cp 

TECHNIQUE: 

Imaging protocol: XR of the chest 

Views: 1 view. 

COMPARISON: 

CR RIBS UNILATERAL 3 VIEWS W PA CHEST DX, LEFT 2018 1:54 PM 

FINDINGS: 

Lungs: No air space disease appreciated. 

Pleural space: No pleural effusion or pneumothorax appreciated. 

Heart/Mediastinum: No acute pathology appreciated. The cardiomediastinal 

silhouette appears enlarged, though, also exaggerated by portable technique. 

Bones/joints: Marked degenerative changes of the shoulders bilaterally, likely 

chronic rotator cuff pathology. 

IMPRESSION: 

                                        1. Cardiomegaly, otherwise no acute path

ology appreciated. Follow up 

recommended if ongoing clinical concern. 

                                        2. Degenerative changes of the shoulders

 as above. 

Lilian Shankar MD On 2019  20:49:46; [id number]

                          2019                         [facility]       

 

            

 

                                        Breast Mammo Scrn CASTILLO incl CAD MA       

                  



BILATERAL DIGITAL SCREENING MAMMOGRAM WITH CAD: 12/3/2018



CLINICAL: /Routine.  



Current study was evaluated with a Computer Aided Detection (CAD) system.  

COMPARISON:Comparison is made to exams dated:  2/15/2016 mammogram, 2016 
mammogram, 2014 mammogram, 2013 mammogram, 2012 mammogram, and 
2011 mammogram - [facility].   

TECHNIQUE: Mammographic views were obtained using digital acquisition. Muut 
Version 1.3 was utilized for computer aided detection.  

FINDINGS: 

There are scattered fibroglandular densities in both breasts.  

Technologist indicates the exam is limited secondary to the patient's underlying
 medical condition. Optimal positioning was not allowed and the patient had 
difficulty holding still. Best images obtained were submitted.  

There are benign vascular calcifications and calcifications in the right breast.
  There also are benign calcifications in the left breast.  

No significant masses, calcifications, or other findings are seen in either 
breast.  

There has been no significant interval change.



IMPRESSION: BENIGN

RECOMMENDATION:There is no mammographic evidence of malignancy. A 1 year 
screening mammogram is recommended.(2019)   This exam was interpreted at 
[id number] for [facility].  

Mirela gallo/anirudh:12/3/2018 10:42:47  

Imaging Technologist(s): Flory Perdue, [facility]

letter sent: BI-RADS 1/2  

Mammogram BI-RADS: 2 Benign

                          2018                         [facility]       

 

            

 

                          Bone Density Scan                         Study: Bone 

Density Scan 

Clinical Indication:  - M81.0   Age-related osteoporosis without current 
pathological fracture;    

Images of the left forearm and left hip  have been performed using Hologic 
Discovery SL scanner. 

COMPARISON: None

FINDINGS:

The left hip  bone mineral density is 76% of the peak reference bone mass with a
 T-score of -1.7.   Left hip BMD is 0.737 g/cm2. Left femoral neck BMD  is 0.557
 g/cm2 and T-score of -2.7.     

 

The left forearm bone mineral density is 72% of the peak reference bone mass 
with a T-score -3.0. Left forearm average BMD is 0.415 g/cm2.     

 

IMPRESSION:

                                        1. Osteoporosis of the left femoral neck

.

                                        2. Osteopenia of the total left hip.

                                        3. Osteoporosis of the left forearm



 



The World Health Organization has established that OSTEOPOROSIS occurs at -2.5 
or more standard deviations (SD) below peak bone mass (T-score on the Hologic 
report). OSTEOPENIA (low bone mass) occurs at greater than -1.0 standard 
deviations to -2.5 standard deviations below peak bone mass.   

 



SL:  [id number]

                          2018                         [facility]       

 

            

 

                          Spine thoracic wo contrast CT                         

Clinical Indication: Fall,

 upper back pain

Comparison: 2017

Technique: Multi-detector CT imaging of the thoracic spine is performed. Coronal
 and sagittal reconstructions were obtained. 

CT Radiation Dose .93 mGy-cm

FINDINGS: 

ALIGNMENT AND GENERAL ASSESSMENT: There is mild kyphosis of the thoracic spine. 
There are no acute fractures or subluxations of the thoracic spine. There is a 
moderate chronic compression fracture at T12 with 30% height loss. There is 
osteonecrosis along the superior aspects of the vertebral body. There is 3 mm 
retropulsion of the posterior superior aspects of T12 without significant spinal
 stenosis.

DISK SPACES AND SOFT TISSUES: MRI has higher sensitivity and specificity for 
disc and soft tissue disease.

There are no disk protrusions or extrusions noted. Mild to moderate multilevel 
anterolateral osteophytes are again seen. There is no central or foraminal 
stenosis noted. 

VISUALIZED THORAX: The lungs are clear and the visualized heart and mediastinum 
are unremarkable.

If there is further concern, CT myelogram or MRI of the thoracic spine may be 
performed for complete assessment.

IMPRESSION:

                                        1. No acute fractures or subluxations of

 the thoracic spine. 

                                        2. Chronic moderate compression fracture

 at T12 with 30% height loss and 3 mm 

retropulsion of the posterior superior aspects. No significant spinal stenosis. 
Osteonecrosis noted along the superior aspects of T12.

   

SL:  [id number]

                          2018                         [facility]       

 

            

 

                          Spine cervical wo contrast CT (ER)                    

     PROCEDURE: CT 

CERVICAL SPINE WITHOUT CONTRAST

Clinical Indication: Patient tripped and fell while walking in the garage..

Comparison: 2017 CT cervical spine.

Technique: Multi-detector CT imaging of the cervical spine is performed. Coronal
 and sagittal reconstructions were obtained.

.02 mGy-cm

FINDINGS: 

ALIGNMENT AND GENERAL ASSESSMENT: No fracture or malalignment through the 
cervicothoracic junction. The craniocervical junction is normal. The 
atlantodental alignment is normal. The posterior elements are intact. The 
intervertebral disc spaces are maintained.

SOFT TISSUES: Survey of the intraspinal soft tissues is limited by this 
modality. The prevertebral and paraspinal soft tissues are normal.

IMPRESSION:

Negative CT of the cervical spine.

******END IMPRESSION******

[id number] 

                          2018                         [facility]       

 

            

 

                          Brain wo contrast CT                         Patient N

[place]: JOCELYNE COLVIN

: 1932; Age: 85 years  y/o Female

MR: [id number]

Study: Brain wo contrast CT 2018 1:48 PM CDT

Ordering Physician: Stephen Arias MD

Clinical Indication:  - fall pain;    

Comparison: 2017

TECHNIQUE: CT images were obtained from the foramen magnum to the vertex without
 the use of intravenous contrast on a multidetector CT. Coronal and sagittal 
reconstructions were obtained.   

CT radiation dose DLP: 981 mGy-cm

FINDINGS:  

There is no evidence of acute intracranial hemorrhage, subacute territorial 
infarct, mass effect, midline shift, extra-axial fluid collection, hydrocephalus
 or other acute abnormalities.

There is moderate generalized cerebral volume loss with associated ventricular 
prominence. There are moderate nonspecific supratentorial white matter ill-
defined hypodensities likely representing chronic small vessel ischemic changes 
in this age. There are no chronic territorial infarcts. There are calcifications
 in the carotid siphons and intradural vertebral arteries.

Advanced degenerative changes in the left TM joint with osteophyte formation. 
The paranasal sinuses and mastoids are clear.

If there is further concern for intracranial pathology or acute stroke, further 
assessment with an MRI of the brain should be considered.

IMPRESSION: 

Moderate age-related changes without evidence of acute intracranial process.



SL: [id number]

                          2018                         [facility]       

 

            

 

                          Ankle 3 views DX                         Patient Name:

 JOCELYNE COLVIN

: 1932; Age: 85 years  y/o Female

MR: [id number]



*  LEFT ANKLE, 3 views

History: Injury, trauma to left ankle.   Status post fall

Technique: Frontal, lateral, and oblique radiographs of the left ankle were 
obtained.



FINDINGS:     

There is no evidence of fracture, dislocation, or acute change. The ankle 
mortise is intact.

There are no degenerative changes or other significant osseous abnormalities. 

   

IMPRESSION:

                                        1. Negative left ankle.    



SL:  [id number]

                          2018                         [facility]       

 

            

 

                          Knee 3 views DX                         Patient Name: 

JOCELYNE COLVIN  

: 1932

Age: 85 years, Female  MR: [id number]

Study: Knee 3 views DX 2018 1:16 PM CDT

Examination:  Knee, left, 3 views

Indication:  Pain. Fall.

Clinical information:  - pain, fall.

Comparison:    None

Findings:

Bones: 

No acute displaced fracture.  

No expansile lytic or sclerotic lesion.

Joints:

The joint spaces are well-maintained.

No dislocation.

No effusion.

Soft tissues:  The soft tissues are unremarkable.

IMPRESSION:

No acute radiographic abnormality.



SL:  [id number]

                          2018                         [facility]       

 

            

 

                          Hip 2/3 views uni w pelvis DX                         

Patient Name: JOCELYNE COLVIN  : 1932

Age: 85 years, Female  MR: [id number]

Study: Hip 2/3 views uni w pelvis DX 2018 1:16 PM CDT

Examination:  Hip, left, 2 views. Pelvis, one view

Indication:  Fall. Pain.

Clinical information:  - pain, fall.

Comparison:    Left hip 2017

Findings:

Bones: 

No acute displaced fracture.  

No expansile lytic or sclerotic lesion.

Joints:

The joint spaces are well-maintained.

No dislocation.

No effusion.

Soft tissues:  The soft tissues are unremarkable.

IMPRESSION:

No acute radiographic abnormality.



SL:  [id number]

                          2018                         [facility]       

 

            

 

                          Ribs unilateral 3 views w PA chest DX                 

        Patient Name: 

JOCELYNE COLVIN  : 1932

Age: 85 years, Female  MR: [id number]

Study: Ribs unilateral 3 views w PA chest DX 2018 1:16 PM CDT

Examination: Chest, PA.  RIBS, left, 3 views.

Indication:  Pain. Injury after fall.

Clinical information:  - fall, pain.

Comparison:    Portable chest 2017

Findings:

Lines/tubes:  None.

Heart: Normal cardiac silhouette. 

Vessels:  The pulmonary vasculature is within normal limits.   Atherosclerotic 
calcifications of the aortic arch.

 

Mediastinum:  No mediastinal or hilar mass or lymphadenopathy.  

Lungs:  No parenchymal mass.   No focal consolidation.  

Pleura:  No pleural effusion.   No pneumothorax.

Soft tissues:  Normal.   No axillary mass or lymphadenopathy.

Bones:  No acute osseous abnormality.   Degenerative changes of the thoracic 
spine. Transverse nondisplaced acute fractures of the left lateral 4th and 5th 
ribs. Kyphoplasty changes of L3.

IMPRESSION:

Left lateral 4th and 5th rib fractures. No pneumothorax. 

Otherwise, no acute radiographic abnormality.



:  [id number]

                          2018                         [facility]       

 

            

 

                          Hip 2/3 views uni DX                         Left hip 

2views: There is no 

fracture or dislocation. There are no other significant osseous, articular or 
soft tissue abnormalities.

IMPRESSION:

No acute radiographic abnormality of the left hip.

 [id number]

                          2017                         [facility]       

 

            

 

                          Ribs unilateral DX                         Left RIBS: 

There is generalized 

osteoporosis. There are no visible fractures or other acute osseous 
abnormalities. Degenerative changes in the thoracic spine are noted. Previous 
kyphoplasty at L3 is noted. There are no acute intrathoracic abnormalities.

IMPRESSION:

No acute radiographic abnormalities of the ribs. If there is still a clinical 
concern, bone scan may be helpful to exclude occult fractures.

 [id number]

                          2017                         [facility]       

 

            

 

                          Spine lumbar 2 or 3 views DX                         L

umbar spine 3 views: There

 is generalized osteoporosis. There is mild curvature to the right at L2. There 
is mild superior endplate compression of L3 with cement in the L3 vertebral body
 consistent with previous vertebroplasty. No other compressions or acute osseous
 abnormalities are seen. Mild osteophyte formation at L2-L3 is noted. The disc 
spaces are normal in width. Atherosclerotic calcification in the aortoiliac 
segment is noted. The SI joints are normal.

IMPRESSION:

Old L3 compression, post vertebroplasty. There are no other acute radiographic 
abnormalities in the lumbar spine. 

 [id number] 

                          2017                         [facility]       

 

            

 

                          Brain wo contrast CT                         EXAM: Bra

in wo contrast CT

PROVIDED CLINICAL HISTORY:  Fall. Contusion. Patient taking anticoagulants.     
        

TECHNIQUE: Contiguous axial images from the skull base to the vertex without 
intravenous contrast. Coronal and sagittal reformats.         

EXPOSURE: Total exam DLP is 981.8 mGy-cm.  

COMPARISON: CT Brain performed 10/19/2008.        

______

FINDINGS: 

BRAIN:     No acute transcortical infarct.    No brain parenchymal contusion or 
edema.  No intracranial hemorrhage or other fluid collection. No intracranial 
mass. No clinically significant white matter disease. Prominent but age-
consistent global parenchymal volume loss.  Atherosclerotic calcifications in 
the intracranial carotid arteries.                                   

VENTRICLES / CISTERNS / SHIFT:      No hydrocephalus.  No significant effacement
 of the basal cisterns or foramen magnum. No significant midline shift.     

BONES / SCALP:  Superficial soft tissue injury, including a moderate-sized left 
parietal-occipital scalp contusion/hematoma.  No acute depressed fracture of the
 calvarium or skull base.  No aggressive bone lesions.             

IMAGED SINUSES / MASTOIDS:   No significant paranasal sinus fluid. No 
significant sinus mucosal thickening.          No significant fluid in the 
imaged mastoid air cells.            

____________

IMPRESSION:

Superficial soft tissue injury. 

No definite CT evidence of acute intracranial abnormality. 

No acute calvarial or skull base fracture.  

No significant interval change of the intracranial findings. 



SL:  [id number]

                          2017                         [facility]       

 

            

 

                          Spine cervical wo contrast CT                         

Study: Spine cervical wo 

contrast CT 

   

Clinical Indication: CT DLP-475.16 - fall, neck pain

Comparison: None

Technique: Multiple axial CT images of the cervical spine were performed without
  the administration of intravenous contrast. Coronal and sagittal 
reconstructions were obtained.

CT Radiation Dose .16 mGy-cm

FINDINGS: Anatomic alignment is maintained across the cervical spine.  No acute 
fracture or dislocation is seen.  There is mild degenerative disc disease 
throughout cervical spine with superimposed areas of uncovertebral arthrosis. 
Moderate-severe facet arthrosis throughout cervical spine is also seen. Moderate
 right neural foraminal narrowing at C4-C5 is seen. Moderate-severe left neural 
foraminal narrowing at C5-C6 is present. Paravertebral soft tissues are 
unremarkable.  The lung apices are clear.



IMPRESSION:

                                        1. No acute bony abnormality of the cerv

ical spine.



SL:  [id number]

                          2017                         [facility]       

 

            

 

                          Chest/Abdomen/Pelvis wo IV contrast CT                

         Study: 

Chest/Abdomen/Pelvis wo IV contrast CT 

Clinical Indication: CT DLP-1071.42 - fall, midline thoracic and lumbar pain

Comparison: None

Technique: Multiple axial CT images of the chest, abdomen, and pelvis were 
acquired without the administration of intravenous contrast. Coronal and 
sagittal reconstructions were obtained.

CT Radiation Dose DLP 1071.42 mGy-cm

Findings: Heart is mildly enlarged. Pericardium is unremarkable. Aortic annular,
 coronary artery, and thoracic aortic calcifications are seen. No pathologic 
mediastinal, hilar, or axillary adenopathy is seen. Calcified right hilar lymph 
node are seen. Peripheral cluster of nodules measuring 1.4 x 1.2 cm in the right
 middle lobe is seen. Larger peripheral cluster of nodules measuring 2 x 1.1 cm 
is also noted in the right middle lobe. Additional linear cluster of nodules 
measuring 1.3 cm in the left lower lobe is also seen. There is no pleural 
effusion or pneumothorax.

Patient is status post cholecystectomy. Multiple bilateral renal hypodense cysts
 are seen with largest measuring 3.8 cm in the left kidney. Liver, pancreas, 
spleen, and adrenal glands are within the normal limits imposed by the lack of 
intravenous contrast. Extensive arterial calcifications are seen. Urinary 
bladder is well-distended. Patient is status post hysterectomy. Scattered 
sigmoid diverticula are seen. The appendix is not clearly visualized. No 
intraperitoneal free air, free fluid, or pathologic adenopathy is seen.



Small, fat-containing bilateral inguinal hernias are seen. Contusions in the 
lateral subcutaneous soft tissues overlying the left buttocks are seen.



Bones are demineralized. Chronic, mild compression deformity of the L3 vertebral
 body is seen, status post prior vertebral body augmentation. No acute bony 
fracture is seen.

IMPRESSION:

                                        1. No evidence of acute injury to the ch

est, abdomen, or pelvis.

                                        2. Multiple nodular opacities in the rig

ht middle and left lower lobes. This may

 represent sequela of prior atypical infectious disease process. Follow-up CT of
 the chest in 3 months time is recommended to assess stability.



SL:  [id number]

                          2017                         [facility]       

 

            

 

                          Neck CTA                         Patient Name: JOCELYNE COLVIN

: 1932; Age: 84 years  y/o Female

MR: [id number]

Study: Neck CTA 2016 8:22 AM CST

   

Clinical Indication: I65.29   Occlusion and stenosis of unspecified carotid 
artery; CT dose  mGycm; Cre - 0.9; GFR - 59; 75 visi

Comparison: CT cervical spine from 10/31/2008

TECHNIQUE: Sequential trans-axial images of the neck were obtained with a multi-
detector helical CT after iodinated contrast administration. Coronal and 
sagittal reconstructions and were obtained, along with 3D post-processing 
imaging for exam interpretation. 



FINDINGS: 

CTA NECK:

VASCULAR EVALUATION: Normal 3 vessel arch anatomy is seen. Mild atherosclerotic 
disease of the origins of the great vessels is noted.

The common carotid arteries are widely patent bilaterally. There is a small, 
noncalcified atherosclerotic plaque in the right carotid bulb, producing up to 
10% luminal narrowing. Minimal atherosclerotic disease of the left carotid bulb 
is seen without significant narrowing.

The internal and external carotid artery origins are widely patent. The cervical
 internal carotid arteries are widely patent.

There is no significant stenosis by NASCET criteria.

Bilateral cervical segments of the vertebral arteries are widely patent.

The source images show no evidence of dissections. 

Any reported ICA stenosis directly references the distal internal carotid 
diameter as the denominator for stenosis measurement.

NON-VASCULAR STRUCTURES: Degenerative changes of the cervical spine are seen.

IMPRESSION:

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 No significant 
stenosis by NASCET criteria. 

   



:  [id number]

                          2016                         [facility]       

 

            

 

                          Ext Lower Venous Doppler Unilat US                    

     Clinical Indication: 

I86.8   Varicose veins of other specified sites; right leg pain and swelling



Comparison: None



Right lower extremity venous Doppler ultrasound exam

Common femoral, superficial femoral and popliteal venous segments demonstrate 
normal flow and compressibility. Normal distal augmentation. Specifically no 
evidence for deep vein thrombosis.

Greater saphenous vein is normal caliber 3 mm proximal thigh and 2.7 mm in the 
calf. There is relatively insignificant short duration reflux noted within the 
greater saphenous vein up to 0.6 seconds only in the proximal calf. No varicose 
veins are demonstrated sonographically. The lesser saphenous vein is 2 mm in 
diameter with no significant reflux.

IMPRESSION: No evidence for deep vein thrombosis within the right lower 
extremity.

No significant varicose veins are demonstrated.

No significant superficial venous incompetence is otherwise noted.





:  [id number]

                          2016                         [facility]       

 

            

 

                          Foot 2 views DX                         Right foot 2 v

iews: There is no fracture

 or dislocation. There are no other significant osseous, articular or soft 
tissue abnormalities.

IMPRESSION:

No acute radiographic abnormality of the right foot.

 [id number]

                          2016                         [facility]       

 

            

 

                          Breast Complete Uni US                         - BREAS

T COMPLETE UNI US/R

ULTRASOUND OF RIGHT BREAST AND RIGHT AXILLA: 2/15/2016

CLINICAL: Density

abnormal mammogram, mammographic nodule/density.  

Comparison is made to exams dated:  2/15/2016 mammogram, 2016 mammogram, 
2014 mammogram, 2013 mammogram, 2012 mammogram and 2011 
mammogram - [facility].  

Color flow and real-time ultrasound of the right breast and axilla were 
performed.  Gray scale images of the real-time examination were reviewed.  All 4
 quadrants, the retroareolar region and axilla are evaluated in this exam.  

No abnormalities were seen sonographically in the right breast or the right 
axilla.  

IMPRESSION: BENIGN 

There is no sonographic evidence of malignancy.  

There is no sonographic abnormality seen in the right breast to correspond with 
the initial mammographic density which is consistent with normal fibroglandular 
tissue.  

Return to annual mammogram screening schedule is recommended. 



Mirela Toney M.D.  

jt/:2/15/2016 11:34:41  

Imaging Technologist: Ayush Barksdale, [facility]

This exam was dictated and interpreted by [id number] for [facility].  

letter sent: Normal exam  

Ultrasound BI-RADS: 2 Benign

                          02/15/2016                         [facility]       

 

            

 

                          Digital Mammo DX Uni [place]                         - DIGI

JESSICA MAMMO DX UNI MA/R

UNILATERAL RIGHT DIGITAL DIAGNOSTIC MAMMOGRAM WITH CAD: 2/15/2016

CLINICAL: Mammographic Abnormality.  

Current study was evaluated with a Computer Aided Detection (CAD) system.  

Comparison is made to exams dated:  2016 mammogram, 2014 mammogram, 
2013 mammogram, 2012 mammogram, 2011 mammogram and 2010 
mammogram - [facility].  

There are scattered fibroglandular densities in the right breast.  

The previously described nodular density in the right breast largely effaces on 
today's additional imaging, representing fibroglandular tissue. However 
ultrasound will be performed for confirmation.  

There are benign vascular calcifications in the right breast.  There also are 
benign scattered calcifications in the right breast.  

No significant masses, calcifications, or other findings are seen in the breast.
  

There has been no significant interval change.

IMPRESSION: INCOMPLETE: NEEDS ADDITIONAL IMAGING EVALUATION

The previously described nodular density in the right breast largely effaces on 
today's additional imaging, representing fibroglandular tissue. However 
ultrasound will be performed for confirmation and to exclude underlying 
pathology.  



SUMMARY:

Ultrasound will be performed at this time; please see dedicated separate report.
  



Mirela Toney M.D.          

jt/penrad:2/15/2016 11:29:41  

Imaging Technologist: Mona Amin, [facility]

This exam was dictated and interpreted by [id number] for [facility].  

Mammogram BI-RADS: 0 Indeterminate

                          02/15/2016                         [facility]       

 

            

 

                          Digital Mammo Screening [facility] [place]                        

 - DIGITAL MAMMO SCREENING

 [facility]

BILATERAL DIGITAL SCREENING MAMMOGRAM WITH CAD: 2016

CLINICAL: Routine.  

Current study was evaluated with a Computer Aided Detection (CAD) system.  

Comparison is made to exams dated:  2014 mammogram, 2013 mammogram, 
2012 mammogram, 2011 mammogram, 2010 mammogram and 4/10/2009 
mammogram - [facility].  

There are scattered fibroglandular densities in both breasts.  

There are benign vascular calcifications in both breasts.  There also are benign
 scattered calcifications in both breasts.  

There is a nodular density in the right breast anterior depth central to the 
nipple seen on the craniocaudal view only.  

No other significant masses, calcifications, or other findings are seen in 
either breast.  

IMPRESSION: INCOMPLETE: NEEDS ADDITIONAL IMAGING EVALUATION

The nodular density in the right breast is indeterminate.  Right diagnostic 
mammogram with possible ultrasound is recommended (spot compression, rolled 
views, and lateral view).  



Mirela Toney M.D.          

jt/penrad:2016 13:26:50  

Imaging Technologist: Vicky Roy, [facility]

This exam was dictated and interpreted by [id number] for [facility].  

letter sent: Additional Imaging  

Mammogram BI-RADS: 0 Indeterminate

                          2016                         [facility]       

 

            

 

                          Chest 2 views DX                         Examination: 

Chest x-ray, 2 views

 

History: R05   Cough, R09.89   Other specified symptoms and signs involving the 
circulatory and respiratory systems 

 

Comparison: 2013

 

Findings: The lungs are clear and without focal consolidation. The 
cardiomediastinal silhouette is within normal limits. No pleural effusion or 
pneumothorax is seen. The osseous structures are without focal abnormality. 

 

IMPRESSION: No acute cardiopulmonary disease. 

 

SL:  16

                          2015                         [facility]       

 

            



                                                                                
                                                                                
                                                                                
                                                                                
                                                                                
                                                                                
                                                                    



Consultation Notes

                    



                                        No Data Provided for This Section       

             



                                                            



Discharge Summaries

                    



                                        No Data Provided for This Section       

             



                                                            



History and Physicals

                    



                                        No Data Provided for This Section       

             



                                                                



Vital Signs

                     



                    Vital Sign                         Value                    

     Date           

                          Comments                         Source               

     

 

                    Temperature Oral (F)                         98.4 F         

                

10/31/2020                                                    [facility]       

 

            

 

                    Heart Rate                         83                       

   10/31/2020       

                                                     [facility]                

    

 

                    Respitory Rate                         18                   

       10/31/2020   

                                                     [facility]                

    

 

                    Systolic (mm Hg)                         138                

          10/31/2020

                                                     [facility]                

  

  

 

                    Diastolic (mm Hg)                         86                

          10/31/2020

                                                    [facility]                

  

  

 

                    Height                         152.4 cm                     

    10/30/2020      

                                                    [facility]                

    

 

                    BMI Calculated                         25.44                

          10/30/2020

                                                    [facility]                

  

  

 

                    Weight                         59.091                       

   10/30/2020       

                                                     [facility]                

    

 

                    Systolic (mm Hg)                         150                

          10/30/2020

                                                     [facility]                

  

  

 

                    Diastolic (mm Hg)                         65                

          10/30/2020

                                                     [facility]                

  

  

 

                    Heart Rate                         84                       

   10/30/2020       

                                                     [facility]                

    

 

                    Respitory Rate                         16                   

       10/30/2020   

                                                     [facility]                

    

 

                    Temperature Oral (F)                         100.3 F        

                 

10/30/2020                                                    [facility]       

 

            

 

                    Respitory Rate                         18                   

       10/18/2020   

                                                     [facility]                

    

 

                    Temperature Oral (F)                         98.1 F         

                

10/18/2020                                                   [facility]       

 

            

 

                    Heart Rate                         65                       

   10/18/2020       

                                                     [facility]                

    

 

                    Systolic (mm Hg)                         104                

          10/18/2020

                                                     [facility]                

  

  

 

                    Diastolic (mm Hg)                         59                

          10/18/2020

                                                     [facility]                

  

  

 

                    Temperature Oral (F)                         98.2 F         

                

10/18/2020                                                    [facility]       

 

            

 

                    Heart Rate                         67                       

   10/18/2020       

                                                     [facility]                

    

 

                    Respitory Rate                         18                   

       10/18/2020   

                                                     [facility]                

    

 

                    Systolic (mm Hg)                         125                

          10/18/2020

                                                     [facility]                

  

  

 

                    Diastolic (mm Hg)                         59                

          10/18/2020

                                                     [facility]                

  

  

 

                    Respitory Rate                         16                   

       10/18/2020   

                                                     [facility]                

    

 

                    Systolic (mm Hg)                         123                

          10/18/2020

                                                     [facility]                

  

  

 

                    Diastolic (mm Hg)                         63                

          10/18/2020

                                                     [facility]                

  

  

 

                    Heart Rate                         75                       

   10/18/2020       

                                                    [facility]                

    

 

                    Temperature Oral (F)                         98 F           

              

10/17/2020                                                    [facility]       

 

            

 

                    Height                         152.4 cm                     

    10/16/2020      

                                                     [facility]                

    

 

                    Weight                         58.182                       

   10/16/2020       

                                                    [facility]                

    

 

                    BMI Calculated                         25.05                

          10/16/2020

                                                     [facility]                

  

  

 

                    Respitory Rate                         30                   

       10/02/2020   

                                                     [facility]                

    

 

                    Systolic (mm Hg)                         173                

          10/02/2020

                                                     [facility]                

  

  

 

                    Diastolic (mm Hg)                         75                

          10/02/2020

                                                     [facility]                

  

  

 

                    Respitory Rate                         30                   

       10/02/2020   

                                                     [facility]                

    

 

                    Systolic (mm Hg)                         176                

          10/02/2020

                                                     [facility]                

  

  

 

                    Diastolic (mm Hg)                         79                

          10/02/2020

                                                     [facility]                

  

  

 

                    Respitory Rate                         23                   

       10/02/2020   

                                                     [facility]                

    

 

                    Systolic (mm Hg)                         176                

          10/02/2020

                                                     [facility]                

  

  

 

                    Diastolic (mm Hg)                         70                

          10/02/2020

                                                    [facility]                

  

  

 

                    Temperature Oral (F)                         98.1 F         

                

10/02/2020                                                    [facility]       

 

            

 

                    Height                         152.4 cm                     

    10/02/2020      

                                                     [facility]                

    

 

                    Weight                         58.182                       

   10/02/2020       

                                                     [facility]                

    

 

                    BMI Calculated                         25.05                

          10/02/2020

                                                    [facility]                

  

  

 

                    Temperature Oral (F)                         97.8 F         

                

2020                                                    [facility]       

 

            

 

                    Heart Rate                         66                       

   2020       

                                                     [facility]                

    

 

                    Respitory Rate                         18                   

       2020   

                                                     [facility]                

    

 

                    Systolic (mm Hg)                         115                

          2020

                                                     [facility]                

  

  

 

                    Diastolic (mm Hg)                         69                

          2020

                                                    [facility]                

  

  

 

                    Temperature Oral (F)                         98.1 F         

                

2020                                                    [facility]       

 

            

 

                    Heart Rate                         76                       

   2020       

                                                     [facility]                

    

 

                    Respitory Rate                         18                   

       2020   

                                                     [facility]                

    

 

                    Systolic (mm Hg)                         146                

          2020

                                                     [facility]                

  

  

 

                    Diastolic (mm Hg)                         73                

          2020

                                                    [facility]                

  

  

 

                    Temperature Oral (F)                         98.3 F         

                

2020                                                   [facility]       

 

            

 

                    Heart Rate                         73                       

   2020       

                                                     [facility]                

    

 

                    Respitory Rate                         18                   

       2020   

                                                     [facility]                

    

 

                    Systolic (mm Hg)                         125                

          2020

                                                     [facility]                

  

  

 

                    Diastolic (mm Hg)                         64                

          2020

                                                     [facility]                

  

  

 

                    Height                         152.4 cm                     

    2020      

                                                     [facility]                

    

 

                    Weight                         58.182                       

   2020       

                                                     [facility]                

    

 

                    BMI Calculated                         25.05                

          2020

                                                     [facility]                

  

  

 

                    Height                         165.1 cm                     

    2020      

                                                     [facility]                

    

 

                    BMI Calculated                         20.84                

          2020

                                                     [facility]                

  

  

 

                    Weight                         56.818                       

   2020       

                                                    [facility]                

    

 

                    Temperature Oral (F)                         98.6 F         

                

2020                                                   [facility]       

 

            

 

                    Heart Rate                         70                       

   2020       

                                                     [facility]                

    

 

                    Respitory Rate                         18                   

       2020   

                                                     [facility]                

    

 

                    Systolic (mm Hg)                         119                

          2020

                                                     [facility]                

  

  

 

                    Diastolic (mm Hg)                         57                

          2020

                                                    [facility]                

  

  

 

                    Temperature Oral (F)                         98.4 F         

                

2020                                                    [facility]       

 

            

 

                    Heart Rate                         79                       

   2020       

                                                     [facility]                

    

 

                    Respitory Rate                         18                   

       2020   

                                                     [facility]                

    

 

                    Systolic (mm Hg)                         106                

          2020

                                                     [facility]                

  

  

 

                    Diastolic (mm Hg)                         54                

          2020

                                                    [facility]                

  

  

 

                    Temperature Oral (F)                         98.7 F         

                

2020                                                    [facility]       

 

            

 

                    Heart Rate                         68                       

   2020       

                                                     [facility]                

    

 

                    Respitory Rate                         17                   

       2020   

                                                     [facility]                

    

 

                    Systolic (mm Hg)                         126                

          2020

                                                     [facility]                

  

  

 

                    Diastolic (mm Hg)                         63                

          2020

                                                     [facility]                

  

  

 

                    Height                         152.4 cm                     

    05/15/2020      

                                                     [facility]                

    

 

                    Weight                         50.909                       

   05/15/2020       

                                                     [facility]                

    

 

                    BMI Calculated                         21.92                

          05/15/2020

                                                     [facility]                

  

  

 

                    BMI Calculated                         25.44                

          05/15/2020

                                                     [facility]                

  

  

 

                    Height                         152.4 cm                     

    05/15/2020      

                                                     [facility]                

    

 

                    BMI Calculated                         25.44                

          05/15/2020

                                                     [facility]                

  

  

 

                    Weight                         59.091                       

   05/15/2020       

                                                     [facility]                

    

 

                    Heart Rate                         65                       

   2020       

                                                     [facility]                

    

 

                    Respitory Rate                         18                   

       2020   

                                                     [facility]                

    

 

                    Systolic (mm Hg)                         163                

          2020

                                                     [facility]                

  

  

 

                    Diastolic (mm Hg)                         63                

          2020

                                                     [facility]                

  

  

 

                    Temperature Oral (F)                         98.3 F         

                

2020                                                    [facility]       

 

            

 

                    Heart Rate                         72                       

   2020       

                                                     [facility]                

    

 

                    Respitory Rate                         17                   

       2020   

                                                     [facility]                

    

 

                    Systolic (mm Hg)                         138                

          2020

                                                     [facility]                

  

  

 

                    Diastolic (mm Hg)                         56                

          2020

                                                     [facility]                

  

  

 

                    Systolic (mm Hg)                         150                

          2020

                                                     [facility]                

  

  

 

                    Diastolic (mm Hg)                         84                

          2020

                                                    [facility]                

  

  

 

                    Heart Rate                         70                       

   2020       

                                                     [facility]                

    

 

                    Respitory Rate                         18                   

       2020   

                                                     [facility]                

    

 

                    Temperature Oral (F)                         98.7 F         

                

2020                                                    [facility]       

 

            

 

                    Height                         162.56 cm                    

     2020     

                                                     [facility]                

    

 

                    BMI Calculated                         30.96                

          2020

                                                     [facility]                

  

  

 

                    Weight                         81.818                       

   2020       

                                                     [facility]                

    

 

                    Temperature Oral (F)                         98.9 F         

                

2019                                                    [facility]       

 

            

 

                    Heart Rate                         59                       

   2019       

                                                     [facility]                

    

 

                    Respitory Rate                         18                   

       2019   

                                                     [facility]                

    

 

                    Systolic (mm Hg)                         135                

          2019

                                                    [facility]                

  

  

 

                    Diastolic (mm Hg)                         73                

          2019

                                                     [facility]                

  

  

 

                    Heart Rate                         62                       

   2019       

                                                     [facility]                

    

 

                    Temperature Oral (F)                         98.1 F         

                

2019                                                    [facility]       

 

            

 

                    Heart Rate                         59                       

   2019       

                                                     [facility]                

    

 

                    Respitory Rate                         17                   

       2019   

                                                     [facility]                

    

 

                    Systolic (mm Hg)                         143                

          2019

                                                    [facility]                

  

  

 

                    Diastolic (mm Hg)                         77                

          2019

                                                     [facility]                

  

  

 

                    Respitory Rate                         16                   

       2019   

                                                     [facility]                

    

 

                    Systolic (mm Hg)                         137                

          2019

                                                     [facility]                

  

  

 

                    Diastolic (mm Hg)                         64                

          2019

                                                     [facility]                

  

  

 

                    Temperature Oral (F)                         97.9 F         

                

2019                                                    [facility]       

 

            

 

                    Height                         152.4 cm                     

    2019      

                                                     [facility]                

    

 

                    Weight                         60.455                       

   2019       

                                                     [facility]                

    

 

                    BMI Calculated                         26.03                

          2019

                                                     [facility]                

  

  

 

                    Height                         154.94 cm                    

     2019     

                                                     [facility]                

    

 

                    BMI Calculated                         24.61                

          2019

                                                     [facility]                

  

  

 

                    Weight                         59.091                       

   2019       

                                                     [facility]                

    

 

                    Heart Rate                         65                       

   2018       

                                                     [facility]                

    

 

                    Systolic (mm Hg)                         145                

          2018

                                                     [facility]                

  

  

 

                    Diastolic (mm Hg)                         64                

          2018

                                                     [facility]                

  

  

 

                    Respitory Rate                         17                   

       2018   

                                                    [facility]                

    

 

                    Temperature Oral (F)                         97.9 F         

                

2018                                                    [facility]       

 

            

 

                    Heart Rate                         59                       

   2018       

                                                     [facility]                

    

 

                    Systolic (mm Hg)                         159                

          2018

                                                     [facility]                

  

  

 

                    Diastolic (mm Hg)                         56                

          2018

                                                     [facility]                

  

  

 

                    Respitory Rate                         20                   

       2018   

                                                     [facility]                

    

 

                    Respitory Rate                         20                   

       2018   

                                                    [facility]                

    

 

                    Heart Rate                         58                       

   2018       

                                                     [facility]                

    

 

                    Systolic (mm Hg)                         159                

          2018

                                                     [facility]                

  

  

 

                    Diastolic (mm Hg)                         56                

          2018

                                                     [facility]                

  

  

 

                    Weight                         59.545                       

   2018       

                                                    [facility]                

    

 

                    BMI Calculated                         25.64                

          2018

                                                    [facility]                

  

  

 

                    Height                         152.4 cm                     

    2018      

                                                    [facility]                

    

 

                    Temperature Oral (F)                         98.7 F         

                

2018                                                   [facility]       

 

            

 

                    Systolic (mm Hg)                         145                

          2017

                                                     [facility]                

  

  

 

                    Diastolic (mm Hg)                         54                

          2017

                                                    [facility]                

  

  

 

                    Temperature Oral (F)                         98.2 F         

                

2017                                                    [facility]       

 

            

 

                    Respitory Rate                         15                   

       2017   

                                                    [facility]                

    

 

                    Temperature Oral (F)                         97.9 F         

                

2017                                                    [facility]       

 

            

 

                    Systolic (mm Hg)                         145                

          2017

                                                     [facility]                

  

  

 

                    Diastolic (mm Hg)                         54                

          2017

                                                     [facility]                

  

  

 

                    Respitory Rate                         14                   

       2017   

                                                     [facility]                

    

 

                    Respitory Rate                         16                   

       2017   

                                                     [facility]                

    

 

                    Systolic (mm Hg)                         164                

          2017

                                                     [facility]                

  

  

 

                    Diastolic (mm Hg)                         58                

          2017

                                                    [facility]                

  

  

 

                    Temperature Oral (F)                         98 F           

              

2017                                                    [facility]       

 

            

 

                    Weight                         59.091                       

   2017       

                                                    [facility]                

    

 

                    BMI Calculated                         25.44                

          2017

                                                     [facility]                

  

  

 

                    Height                         152.4 cm                     

    2017      

                                                    [facility]                

    

 

                    Heart Rate                         60                       

   2017       

                                                     [facility]                

    

 

                    Systolic (mm Hg)                         108                

          2016

                                                     [facility]                

  

  

 

                    Diastolic (mm Hg)                         48                

          2016

                                                    [facility]                

  

  

 

                    Temperature Oral (F)                         99.3 F         

                

2016                                                   [facility]       

 

            

 

                    Heart Rate                         58                       

   2016       

                                                     [facility]                

    

 

                    Respitory Rate                         20                   

       2016   

                                                     [facility]                

    

 

                    Systolic (mm Hg)                         116                

          2016

                                                     [facility]                

  

  

 

                    Diastolic (mm Hg)                         53                

          2016

                                                    [facility]                

  

  

 

                    Heart Rate                         64                       

   2016       

                                                    [facility]                

    

 

                    Temperature Oral (F)                         99.9 F         

                

2016                                                   [facility]       

 

            

 

                    Respitory Rate                         20                   

       2016   

                                                     [facility]                

    

 

                    Respitory Rate                         20                   

       2016   

                                                    [facility]                

    

 

                    Systolic (mm Hg)                         140                

          2016

                                                     [facility]                

  

  

 

                    Diastolic (mm Hg)                         54                

          2016

                                                    [facility]                

  

  

 

                    Heart Rate                         66                       

   2016       

                                                    [facility]                

    

 

                    Temperature Oral (F)                         102.4 F        

                 

2016                                                   [facility]       

 

            

 

                    Weight                         61.818                       

   2016       

                                                    [facility]                

    

 

                    Height                         152.4 cm                     

    2016      

                                                    [facility]                

    

 

                    BMI Calculated                         26.62                

          2016

                                                    [facility]                

  

  

 

                    Diastolic (mm Hg)                         65.0              

            

11/15/2011                                                   [facility]       

 

            

 

                    Systolic (mm Hg)                         132.0              

            

11/15/2011                                                   [facility]       

 

            

 

                    Respitory Rate                         20.0                 

         11/15/2011 

                                                    [facility]                

   

 

 

                    Heart Rate                         58.0                     

     11/15/2011     

                                                    [facility]                

    

 

                    Temperature Oral (F)                         98.3 F         

                

11/15/2011                                                   [facility]       

 

            

 

                    Diastolic (mm Hg)                         62.0              

            

11/15/2011                                                   [facility]       

 

            

 

                    Respitory Rate                         20.0                 

         11/15/2011 

                                                    [facility]                

   

 

 

                    Heart Rate                         56.0                     

     11/15/2011     

                                                    [facility]                

    

 

                    Temperature Oral (F)                         98.4 F         

                

11/15/2011                                                   [facility]       

 

            

 

                    Systolic (mm Hg)                         114.0              

            

11/15/2011                                                   [facility]       

 

            

 

                    Temperature Oral (F)                         97.3 F         

                

11/15/2011                                                   [facility]       

 

            

 

                    Diastolic (mm Hg)                         57.0              

            

11/15/2011                                                   [facility]       

 

            

 

                    Heart Rate                         63.0                     

     11/15/2011     

                                                    [facility]                

    

 

                    Systolic (mm Hg)                         115.0              

            

11/15/2011                                                   [facility]       

 

            

 

                    Respitory Rate                         18.0                 

         11/15/2011 

                                                    [facility]                

   

 

 

                    Weight                         62.727                       

   2011       

                                                    [facility]                

    

 

                    Height                         152.4 cm                     

    2011      

                                                    [facility]                

    



                                                                                
                                                                                
                                                                                
                                                                                
                                                                                
                                                                                
                                                                                
                                                                                
                                                                                
                                                                                
                                                                                
                                                                                
                                                                                
                                                                                
                                                                                
                                                                                
                                                                                
                                                                                
                                                                                
                                                                                
                                                                                
                                                                                
                                                                                
                                                                                
                                                                                
                                                                                
                                                                                
                                                                                
                                                                                
                                                                                
                                                                                
                                                                                
                                                                                
                                                                                
                                                                                
                                                                                
                                                                                
                                                                                
                                                                                
                                        



Encounters

                    



                    Location                         Location Details           

              

Encounter Type                         Encounter Number                         

Reason For Visit                         Attending Provider                     

                    ADM Date                         [place] Date                    

     Status     

                                        Source                    

 

                    [facility]                                                

  Inpatient         

                    [id number]                                                

  

CORTEZ GLEASONAT                          2011                         

11/15/2011                         Discharged                         [facility]                                                

  Outpatient        

                          [id number]                         SCREENING        

         

                          CHRIS MARTINEZ                          2012     

                  

                                             Active                         [facility]          

          

 

                                                                  AUDIT         

                

[id number]                                                                        

 

                    2013               

               

                                        [facility]                    

 

                                                                  AUDIT         

                

[id number]                                                                        

 

                    2013               

               

                                        [facility]                    

 

                                                                  AUDIT         

                

[id number]                                                                        

 

                    2013               

               

                                        [facility]                    

 

                                                                  AUDIT         

                

[id number]                                                                        

 

                    07/15/2013                         2013               

               

                                        [facility]                    

 

                                                                  AUDIT         

                

[id number]                                                                        

 

                    2013               

               

                                        [facility]                    

 

                                                                  AUDIT         

                

[id number]                                                                        

 

                    2013               

               

                                        [facility]                    

 

                                                                  AUDIT         

                

[id number]                                                                        

 

                    2013               

               

                                        [facility]                    

 

                                                                  AUDIT         

                

[id number]                                                                        

 

                    01/15/2014                         01/15/2014               

               

                                        [facility]                    

 

                                                                      [facility], [facility]vi

uche: BAPAT,CORTEZ, 

Status: Pen, Time: 9:30 AM                         [id number]                     

                                                                        20

14              

                    2013                                                  

[facility]                    

 

                                                                  AUDIT         

                

[id number]                                                                        

 

                    2014               

               

                                        [facility]                    

 

                                                                  AUDIT         

                

[id number]                                                                        

 

                    2014               

               

                                        [facility]                    

 

                                                                  AUDIT         

                

[id number]                                                                        

 

                    2014               

               

                                        [facility]                    

 

                                                                  AUDIT         

                

[id number]                                                                        

 

                    2014               

               

                                        [facility]                    

 

                                                                  AUDIT         

                

[id number]                                                                        

 

                    2014               

               

                                        [facility]                    

 

                                                                  AUDIT         

                

[id number]                                                                        

 

                    2014               

               

                                        [facility]                    

 

                                                                  AUDIT         

                

[id number]                                                                        

 

                    2014               

               

                                        [facility]                    

 

                                                                  AUDIT         

                

[id number]                                                                        

 

                    2014               

               

                                        [facility]                    

 

                                                                      [facility], [facility]vi

uche: BAPAT,CORTEZ, 

Status: Pen, Time: 10:15 AM                         [id number]                    

                                                                        20

14             

                    2014                                                  

[facility]                    

 

                          [facility]                   

                          

                    Outpatient                         [id number]             

               

                          Souheil Campos                          2014                                                  

[facility]                   

                          

                    Outpatient                         [id number]             

               

                          Souheil Campos                          2015                                                  

[facility]                   

                          

                    Outpatient                         [id number]             

               

                          Souheil Campos                          2016                                                  

[facility]                   

                          

                    Outpatient                         [id number]             

               

                          Souheil Campos                          02/15/2016    

    

                    2016                                                  

[facility]                   

                          

                    Outpatient                         [id number]             

               

                          Chucky Beck                          

016 

                          2016                                            

  

                                        [facility]                   

                          

                    Outpatient                         [id number]             

               

                          Chucky Barrios Beck                          

016 

                          2016                                            

  

                                        [facility]                   

                          

                          [facility]                         [id number]

13                   

                                             Sukumar Molinaeal                    

      

2016                                   

                                        [facility]                   

                          

                    Outpatient                         [id number]             

               

                          Chucky Barrios Beck                          

016 

                          2016                                            

  

                                        [facility]                   

                          

                    Emergency                         [id number]              

               

                          Guillermo Chukwuma                          2017                                                  

[facility]                   

                          

                    Outpatient                         [id number]             

               

                          Souheil Campos                          2017                                                  

[facility]                   

                          

                    Emergency                         [id number]              

               

                          Guillermo Chukwuma                          2018                                                  

[facility]                   

                          

                    Outpatient                         [id number]             

               

                          Souheil Cmapos                          2018                                                  

[facility]                   

                          

                    Outpatient                         [id number]             

               

                          Souheil Campos                          2018                                                  

[facility]                   

                          

                    Recurring                         [id number]              

               

                          Edgar Will                          2019                                                  

[facility]                   

                          

                    Observation                         [id number]            

               

                          Arslan SEBASTIANyas                          2019                                                  

[facility]                   

                          

                    Recurring                         [id number]              

               

                          Edgar Will                          2019                                                  

[facility]                   

                          

                    Recurring                         [id number]              

               

                          Edgar Mendozarique                          01/10/2020   

     

                    2020                                                  

[facility]                   

                          

                    Emergency                         [id number]              

               

                          Melanie Garnett                          2020                                                  

[facility]                   

                          

                    Recurring                         [id number]              

               

                          Edgar Will                          02/10/2020   

     

                    2020                                                  

[facility]                   

                          

                    Recurring                         [id number]              

               

                          Edgar Will                          2020                                                  

[facility]                   

                          

                    Recurring                         [id number]              

               

                          Edgar Will                          2020                                                  

[facility]                   

                          

                    Observation                         [id number]            

               

                          Maico Movva                          05/15/2020       

   

                    2020                                                  

[facility]                   

                          

                    Observation                         [id number]            

               

                          Rachel Montes                          2020                                                  

[facility]                   

                          

                    Recurring                         [id number]              

               

                          [facility]                          2020                                                  

[facility]                   

                          

                    Recurring                         [id number]              

               

                          [facility]                          2020                                                  

[facility]                   

                          

                    Recurring                         [id number]              

               

                          Edgar Sullivan                          

020  

                          10/17/2020                                            

   

                                        [facility]                   

                          

                          Bedded Outpatient                         [id number]

                     

                                             [facility]                   

       

10/02/2020                         10/02/2020                                   

                                        [facility]                   

                          

                          Bedded Outpatient                         [id number]

                     

                                             [facility]                   

       

10/16/2020                         10/18/2020                                   

                                        [facility]                   

                          

                    Emergency                         [id number]              

               

                          Guillermo Perla                          10/30/2020   

     

                    10/31/2020                                                  

[facility]                                                

  DS                

                    [id number]                         [place]                    

     ER[facility]                                                                           

 

                          Active                         [facility]           

         



                                                                                
                                                                                
                                                                                
                                                                                
                                                                                
                                                                                
                                                                                
                                                                                
                    



Procedures

                    



                    Procedure                         Code                      

   Date             

                    Perfomer                         Comments                   

      

Source                    

 

                    Appendectomy                         [id number]               

                    

                                                                      [facility]   

                 

 

                          Cataract extraction<sup>1</sup>                       

  [id number]                

                                                                      [facility]        

           

                                        [facility]                    

 

                    Cholecystectomy                         [id number]            

                    

                                                                      [facility]

                    

 

                    Hysterectomy                         [id number]              

                    

                                                                      [facility]  

                  

 

                    Operation<sup>2</sup>                         [id number]     

                    

                                                    bladder suspension          

  

                                        [facility]                    

 

                                        Primary decompression of thoracic spinal

 cord and fusion of joint of thoracic 

spine                         [id number]                                         

                                                                      [facility]         

           



                                                                                
                                                                



Assessment and Plan

                    



                    Assessment and Plan                         Date            

             Source 

                   

 

                                        Extracted from:Title: Clinical Document

Author: Shant Arias MD

Date: 10/18/20

Pulmonary/Critical Care Medicine progess note

Shant Arias MD



SUBJECTIVE:

Seen and examined.  Patient is breathing much better.  Patient daughter is at 
the bedside.  X-ray results reviewed and discussed with them

OBJECTIVE:





Vitals    Tmp(F)    Tmp(C)    Ttype    BP    MAP    Pulse    RR    SpO2    FIO2 
   ETCO2

                                        10/18 07:41    ----    ----    ----    -

----    ---    ---    18    99     

5.0L/m    ---

                                        10/18 07:19    98.1    36.72    oral    

104/59    ---    65    --    100    --- 

   ---

                                        10/17 23:00    98.2    36.78    oral    

125/59    ---    67    18    99    ---  

  ---

                                        10/17 21:09    ----    ----    ----    -

----    ---    ---    --    100     

5.0L/m    ---

                                        10/17 21:07    ----    ----    ----    -

----    ---    ---    16    100     

5.0L/m    ---

                                        24 Hr Tmax: 98.2F (36.78c) at 10/17 23:0

0        Vital Signs are the last 5 in 

the past 48 hours.

                                        24 Hr Tmin:  98F (36.67c) at 10/17 15:27

        Weights are the last 5 in 60 

days, plus initial.

Date    Wt(kg)    Wt(lb)    Ht(cm)    Ht(in)    Method    BMI    BSA

                                        10/16 (initial)     58.18     128.00    

    Estimated     25.1    1.57

                                        10/16    152.40     60.00    Stated

                                        (no point of care glucose results charte

d in last 24 hours)

Most Recent Scores:

                                        10/18/20    Efrain Coma Score        15

                                        10/18/20    Ag Score    21

                                        10/18/20    [place] Fall Score    

    10

Lines, Tubes, and Drains:

                                        10/16/2020 20:00 Peripheral Lines: Forea

rm Right 20 gauge Over the needle 

catheter

Surgical Procedures:

                                        (no date)    DUAL CHAMBER PACEMAKER IMPL

ANT

   [id number]    (primary surgeon unspecified)



Labs (Last four charted values)

WBC                     8.2    (OCT 16)

Hgb                     L 11.4    (OCT 16)

Hct                     L 33.6    (OCT 16)

Plt                     257    (OCT 16)

Na                      141    (OCT 16)

K                       4.1    (OCT 16)

CO2                     27    (OCT 16)

Cl                      108    (OCT 16)

Cr                      1.14    (OCT 16)

BUN                     20    (OCT 16)

Glucose Random              H 116    (OCT 16)

Ca                      9.6    (OCT 16)

PT                      14.7    (OCT 16)

INR                     1.14    (OCT 16)

PTT                     23.0    (OCT 16)



RADIOLOGY:

                                        1. Stable faint left apical pleural line

, suspicious for small pneumothorax.

This has not increased in size.

                                        2. Otherwise no change. No new consolida

tion. 

ASSESSMENT and EXAM:

HEENT:normocephalic, atraumatic

Skin: no rash

Chest: symmetrical expansion, no wheezing, no rales, no crackles, left 
subclavian area pacemaker in the place

Heart: Regular rhythm, no murmurs

Abdomen: Soft, nontender, bowel sounds present

Ext: no edema

CNS: alert and oriented, no focal neurological deficits

DIAGNOSES and PROBLEMS:

Left small pneumothorax

Status post pacemaker placement for sick sinus syndrome

PLAN and TREATMENT:

Seen and examined.  Patient is hemodynamically stable.  She is oxygenating very 
well.  Her pneumothorax appears smaller on current chest x-ray.

Precautions to be taken discussed with patient and her daughter.  She will 
undergo chest x-ray on Wednesday

Current medications reviewed

Okay to discharge home



Scheduled Meds (8):

                                        10/16/20 amLODIPine 2.5 mg PO Bedtime

                                        10/16/20 atorvastatin 40 mg PO Bedtime

                                        10/17/20 bumetanide (Bumex) 1 mg PO Bobo

y

                                        10/17/20 clopidogrel 75 mg PO Daily

                                        10/16/20 flecainide 100 mg PO Q12H

                                        10/16/20 losartan 50 mg PO BID

                                        10/16/20 minocycline 100 mg PO [id number]

                                        10/16/20 pantoprazole 40 mg PO BID-Befor

e Meals



Unscheduled Meds (1):

                                        10/16/20 influenza virus vaccine, inacti

vated (influenza virus vaccine, 

inactivated high-dose preservative-free intramuscular suspension) 0.7 mL IM 
ONCALL



Extracted from:Title: Consult Note

Author: Shant Arias MD

Date: 10/17/20

 



 

Sick sinus syndrome(I49.5)  



 Left-sided pneumothorax



From pulmonary standpoint patient is a 50% pneumothorax which was not seen 
yesterday after pacemaker placement. She is hemodynamic stable. She is 
oxygenating well. I spoke with the patient's daughter and the cardiologist on 
call. We will keep her in the hospital. Initiate on 5 L of humidified oxygen 
overnight. Chest x-ray PA and lateral in the morningif stable she can be 
discharged home.





                          10/18/2020                         [facility]       

 

            

 

                                        Extracted from:Title: Progress Note

Author: Saurabh Mcneil MD

Date: 20

        Unstable anginastatus post cardiac catheterizationwith severe disease in
 the LAD, status post angioplasty and stenting of the LAD with the deployment of
 a 2.0 x 15 mm Resolute Elver drug-eluting stent in the distal LAD to cover the 
distal lesion. Patient with no recurrent symptomswill continue on 
aspirinPlavixand warfarinas recommended by cardiologyand await clearancefor 
discharge

 Hypertensionblood pressure remains adequately controlled

 Paroxysmal atrial fibrillation, ventricular rate controlled , continue on 
anticoagulant   Dyslipidemiawe will continue on statins      

   Pending clearance from cardiologyfor discharge     





Extracted from:Title: General Admission H&P *

Author: Maico Norman MD

Date: 5/15/20

 Impression and Plan

CP: Pt scheduled for OP angioram to evaluate for CAD. Cardiology consulted from 
ER and possible angiogram today

AF: On flecainide and metoprolol at home and warfarin for anticoag. Now rate 
controlled. Hold meds for now. Warfarin held

HTN: On losartan at home. Hold for now

COPD: Well controlled. Prn nebs

DVT prophylaxis SCD

Code status CPR

Dispo: Home once stable

                          2020                         [facility]       

 

            

 

                                        Extracted from:Title: Clinical Document

Author: Arslan Deleon MD

Date: 19

Discharge Summary

Name: Yonathan Dolan

Record Number: [id number]

Admission Date: 2019

Discharge Date: 2019

Copies to: Dr. Ned Campos

Diagnoses: Hypocalcemia

A. fib with RVR

Hyp hypokalemia

Hypomagnesemia

Hypocalcemia

Diabetes

Hypertension next hyperlipidemia

Osteoporosis

Procedures:

Chief Complaint I have a palpitation and weakness

HPI: See the HPI

PMH: See the HPI

Hospital course: 87-year-old female with multiple medical problem came to the ER
 with after having A. fib with RVR and palpitation and generalized weakness.  
Patient heart rate is back to the baseline after starting home medication.  
Patient was seen by the cardiologist.  Patient has a low magnesium.  The 
magnesium has been replaced.  Patient also has a low calcium.  The calcium has 
been replaced.  Patient was seen by the endocrinologist and the cardiologist.  
Cardiologist thinks patient has A. fib with because of the low magnesium and 
calcium.  Now patient is back to the baseline.  Endocrine recommended to having 
a calcium and magnesium at home to take every day.  Right now patient is 
symptomatically free.

Discharge condition: Stable

   Recent signficant physical findings: Vitals are stable

   Recent significant lab and radiology results: Labs (Last four charted values)

WBC                     8.4    (DEC 28)    H 11.5    (DEC 27)

Hgb                     L 10.6    (DEC 28)    12.0    (DEC 27)

Hct                     L 31.6    (DEC 28)    36.0    (DEC 27)

Plt                     249    (DEC 28)    275    (DEC 27)

Na                      144    (DEC 28)    143    (DEC 27)

K                       L 3.4    (DEC 28)    3.7    (DEC 27)

CO2                     30    (DEC 28)    25    (DEC 27)

Cl                      108    (DEC 28)    109    (DEC 27)

Cr                      0.71    (DEC 28)    0.95    (DEC 27)

BUN                     15    (DEC 28)    17    (DEC 27)

Glucose Random              97    (DEC 28)    H 110    (DEC 27)

Mg                      L 1.6    (DEC 28)    C 0.8    (DEC 27)

Phos                    3.2    (DEC 28)    2.6    (DEC 27)

Ca                      L 8.0    (DEC 28)    C 6.9    (DEC 27)

PT                      H 33.0    (DEC 28)    H 34.5    (DEC 27)

INR                     H 3.14    (DEC 28)    H 3.32    (DEC 27)

PTT                     H 43.4    (DEC 28)    34.8    (DEC 27)

Troponin                <0.02    (DEC 28)    <0.02    (DEC 27)

Discharge therapy:

   Medications continue home medications

We will add magnesium oxide 4 mg p.o. daily also Caltrate D1 tablet twice daily

   Diet healthy heart diet

   Activity as tolerated

   Follow up with Dr. Terry shah



Extracted from:Title: History and Physical

Author: Juliana Robles MD

Date: 19

 



                                        87-year-old F with PMH of paroxysmal atr

ial fibrillation,and HTN admitted for 

hypocalcemia, paroxysmal atrial fibrillation hypomagnesemia

 

                                        1.Hypocalcemia(E83.51) 

Calcium 6.9, corrected for albumin 7.5, 

                                        --Endocrinology consult

                                        --Calcium gluconate 3 g IV ordered

                                        --Follow-up vitamin D level

 

 2.Paroxysmal atrial fibrillation(I48.0) 

CHADSVASC 4 on coumadin, INR 3.2

                                        --Continue flecainide, metoprolol

                                        --Monitor INR and resume Coumadin

                                        --Cardiology consult

 

 3.Hypomagnesemia(E83.42) 

Magnesium 0.8. Likely due to diuretic use

                                        --Replace for goal of 2.0

 

Ordered:

 

Admit/Condition, 19 21:22:00 CST, Status: Out Patient with Observation 
Services, Telemetry Capable Location, Expected LOS: 1 Midnight, Oskar Gordon MD, Admit MD Review/Approve Yes, Isolation: No Isolation, Universal 
Precautions, Hypomagnesemia | Palpitat...



 

 4.HTN (hypertension)(I10) 

Continue losartanandamlodipine

 

 



  Already on Coumadin

  Home once stable



                          2019                         [facility]       

 

            



                                                                            



Plan of Care





                    Plan of Care        Date                Source

 

                                        [facility] Adult Vital Signs 2012 Routine

[QLH] PROTHROMBIN TIME-INR 2013 

Routine[QLH] PROTHROMBIN TIME-INR 07/15/2013 Routine[QLH] PROTHROMBIN TIME-INR 
2014 Routine[QLH] LIPID PANEL Routine[QLH] CMP W/EGFR Routine             
                          2014                         [facility]      

              

 

                                        [facility]P Adult Vital Signs 2012 Routine

[QLH] PROTHROMBIN TIME-INR 2013 

Routine[QLH] PROTHROMBIN TIME-INR 07/15/2013 Routine[QLH] PROTHROMBIN TIME-INR 
2014 Routine[QLH] LIPID PANEL Routine[QLH] CMP W/EGFR Routine             
                          2014                         [facility]      

              

 

                                        [facility]P Adult Vital Signs 2012 Routine

[QLH] PROTHROMBIN TIME-INR 2013 

Routine[QLH] PROTHROMBIN TIME-INR 07/15/2013 Routine[QLH] PROTHROMBIN TIME-INR 
2014 Routine[QLH] LIPID PANEL Routine[QLH] CMP W/EGFR Routine             
                          2014                         [facility]      

              

 

                                        [facility] Adult Vital Signs 2012 Routine

[QLH] PROTHROMBIN TIME-INR 2013 

Routine[QLH] PROTHROMBIN TIME-INR 07/15/2013 Routine[QLH] PROTHROMBIN TIME-INR 
2014 Routine[QLH] LIPID PANEL Routine[QLH] CMP W/EGFR Routine             
                          2014                         [facility]      

              

 

                                        [facility] Adult Vital Signs 2012 Routine

[QLH] PROTHROMBIN TIME-INR 2013 

Routine[QLH] PROTHROMBIN TIME-INR 07/15/2013 Routine[QLH] PROTHROMBIN TIME-INR 
2014 Routine[QLH] LIPID PANEL Routine[QLH] CMP W/EGFR Routine             
                          2014                         [facility]      

              

 

                                        [facility] Adult Vital Signs 2012 Routine

[QLH] PROTHROMBIN TIME-INR 2013 

Routine[QLH] PROTHROMBIN TIME-INR 07/15/2013 Routine[QLH] PROTHROMBIN TIME-INR 
2014 Routine[QLH] LIPID PANEL Routine[QLH] CMP W/EGFR Routine             
                          2014                         [facility]      

              

 

                                        [facility] Adult Vital Signs 2012 Routine

[QLH] PROTHROMBIN TIME-INR 2013 

Routine[QLH] PROTHROMBIN TIME-INR 07/15/2013 Routine[QLH] PROTHROMBIN TIME-INR 
2014 Routine[QLH] LIPID PANEL Routine[QLH] CMP W/EGFR Routine             
                          2014                         [facility]      

              

 

                                        [facility] Adult Vital Signs 2012 Routine

[QLH] LIPID PANEL 2013 

Routine[QLH] PROTHROMBIN TIME-INR 2013 Routine[QLH] PROTHROMBIN TIME-INR 
07/15/2013 Routine[QLH] PROTHROMBIN TIME-INR 2014 Routine[QLH] LIPID PANEL
 Routine[QLH] CMP W/EGFR Routine                         2014             

                                        [facility]                    

 

                                        [facility] Adult Vital Signs 2012 Routine

[QLH] LIPID PANEL 2013 

Routine[QLH] PROTHROMBIN TIME-INR 2013 Routine[QLH] PROTHROMBIN TIME-INR 
07/15/2013 Routine                         01/15/2014                         [facility]                    

 

                                        [facility] Adult Vital Signs 2012 Routine

[QLH] LIPID PANEL 2013 

Routine[QLH] PROTHROMBIN TIME-INR 2013 Routine[QLH] PROTHROMBIN TIME-INR 
07/15/2013 Routine                         2013                         [facility]                    

 

                                        [facility] Adult Vital Signs 2012 Routine

[QLH] LIPID PANEL 2013 

Routine[QLH] PROTHROMBIN TIME-INR 2013 Routine[QLH] PROTHROMBIN TIME-INR 
07/15/2013 Routine                         2013                         [facility]                    

 

                                        [facility] Adult Vital Signs 2012 Routine

[QLH] PROTHROMBIN TIME-INR 07/15/2013 

Routine[QLH] LIPID PANEL 2013 Routine[QLH] PROTHROMBIN TIME-INR 2013
 Routine                         2013                         [facility]                    

 

                                        [facility] Adult Vital Signs 2012 Routine

[QLH] PROTHROMBIN TIME-INR 2013 

Routine[QLH] LIPID PANEL 2013 Routine[QLH] PROTHROMBIN TIME-INR 07/15/2013
 Routine                         2013                         [facility]                    

 

                                        [QLH] PROTHROMBIN TIME-INR 2013 Ro

[email or web address] Vital Signs 2012 

Routine[QLH] LIPID PANEL 2013 Routine                         2013  

                                        [facility]                    

 

                                        [facility] Adult Vital Signs 2012 Routine

[QLH] LIPID PANEL 2013 Routine    

                          2013                         [facility]      

     

         

 

                                        [facility] Adult Vital Signs 2012 Routine

[[facility]] LIPID PANEL 2013 Routine    

                          2013                         [facility]      

     

         



                                                                                
                                                                                
                                                                                
                                                                        



Social History

                    



                    Social History                         Date                 

        Source      

              

 

                                        Social History TypeResponse

Alcohol

Never

Substance Abuse

Use: None.

Smoking Status

Former smoker; Exposure to Tobacco Smoke None; Cigarette Smoking Last 365 Days 
No; Reg Smoking Cessation Counseling No

entered on: 10/16/20

                          05/15/2020                         [facility]       

 

            

 

                                        Caffeine Use        (Active)     Former 

Smoker (V15.82);         (Active)     

Smoking Cigarettes For 1  Pack-years (305.1);         (Active)                  
                          2014                         [facility]      

              



                                                                                
        



Family History

                    



                    Value                         Date                         S

ource               

     

 

                                        Sororal history of Heart Disease (V17.49

);         (Active)     Sororal history 

of Hypertension (V17.49);         (Active)                              

2014                              [facility]                    

 

                                        Sororal history of Heart Disease (V17.49

);         (Active)     Sororal history 

of Hypertension (V17.49);         (Active)                              

2014                              [facility]                    

 

                                        Sororal history of Heart Disease (V17.49

);         (Active)     Sororal history 

of Hypertension (V17.49);         (Active)                              

2014                              [facility]                    

 

                                        Sororal history of Heart Disease (V17.49

);         (Active)     Sororal history 

of Hypertension (V17.49);         (Active)                              

2014                              [facility]                    

 

                                        Sororal history of Heart Disease (V17.49

);         (Active)     Sororal history 

of Hypertension (V17.49);         (Active)                              

2014                              [facility]                    

 

                                        Sororal history of Heart Disease (V17.49

);         (Active)     Sororal history 

of Hypertension (V17.49);         (Active)                              

2014                              [facility]                    

 

                                        Sororal history of Heart Disease (V17.49

);         (Active)     Sororal history 

of Hypertension (V17.49);         (Active)                              

2014                              [facility]                    

 

                                        Sororal history of Heart Disease (V17.49

);         (Active)     Sororal history 

of Hypertension (V17.49);         (Active)                              

2014                              [facility]                    

 

                                        Sororal history of Heart Disease (V17.49

);         (Active)     Sororal history 

of Hypertension (V17.49);         (Active)                              

01/15/2014                              [facility]                    

 

                                        Sororal history of Heart Disease (V17.49

);         (Active)     Sororal history 

of Hypertension (V17.49);         (Active)                              

2013                              [facility]                    

 

                                        Sororal history of Heart Disease (V17.49

);         (Active)     Sororal history 

of Hypertension (V17.49);         (Active)                              

2013                              [facility]                    

 

                                        Sororal history of Heart Disease (V17.49

);         (Active)     Sororal history 

of Hypertension (V17.49);         (Active)                              

2013                              [facility]                    

 

                                        Sororal history of Hypertension (V17.49)

;         (Active)     Sororal history 

of Heart Disease (V17.49);         (Active)                              

2013                              [facility]                    

 

                                        Sororal history of Hypertension (V17.49)

;         (Active)     Sororal history 

of Heart Disease (V17.49);         (Active)                              

2013                              [facility]                    

 

                                        Sororal history of Heart Disease (V17.49

);         (Active)     Sororal history 

of Hypertension (V17.49);         (Active)                              

2013                              [facility]                    

 

                                        Sororal history of Heart Disease (V17.49

);         (Active)     Sororal history 

of Hypertension (V17.49);         (Active)                              

2013                              [facility]                    



                                                                                
                                                                                
                                                                                
                                                                        



Advance Directives

                    



                    Order Name                         Results                  

       Value        

                          Date                         Source                   

 

 

                          Advance Directives                         Advance Dir

ectives                   

                          No Advance Directives available.                      

   2014       

                                        [facility]                    

 

                          Advance Directives                         Advance Dir

ectives                   

                          No Advance Directives available.                      

   2014       

                                        [facility]                    

 

                          Advance Directives                         Advance Dir

ectives                   

                          No Advance Directives available.                      

   2014       

                                        [facility]                    

 

                          Advance Directives                         Advance Dir

ectives                   

                          No Advance Directives available.                      

   2014       

                                        [facility]                    

 

                          Advance Directives                         Advance Dir

ectives                   

                          No Advance Directives available.                      

   2014       

                                        [facility]                    

 

                          Advance Directives                         Advance Dir

ectives                   

                          No Advance Directives available.                      

   2014       

                                        [facility]                    

 

                          Advance Directives                         Advance Dir

ectives                   

                          No Advance Directives available.                      

   2014       

                                        [facility]                    

 

                          Advance Directives                         Advance Dir

ectives                   

                          No Advance Directives available.                      

   2014       

                                        [facility]                    

 

                          Advance Directives                         Advance Dir

ectives                   

                          No Advance Directives available.                      

   01/15/2014       

                                        [facility]                    

 

                          Advance Directives                         Advance Dir

ectives                   

                          No Advance Directives available.                      

   2013       

                                        [facility]                    

 

                          Advance Directives                         Advance Dir

ectives                   

                          No Advance Directives available.                      

   2013       

                                        [facility]                    

 

                          Advance Directives                         Advance Dir

ectives                   

                          No Advance Directives available.                      

   2013       

                                        [facility]                    

 

                          Advance Directives                         Advance Dir

ectives                   

                          No Advance Directives available.                      

   2013       

                                        [facility]                    

 

                          Advance Directives                         Advance Dir

ectives                   

                          No Advance Directives available.                      

   2013       

                                        [facility]                    

 

                          Advance Directives                         Advance Dir

ectives                   

                          No Advance Directives available.                      

   2013       

                                        [facility]                    

 

                          Advance Directives                         Advance Dir

ectives                   

                          No Advance Directives available.                      

   2013       

                                        [facility]                    



                                                                                
                                                                                
                                                                                
                                                                    



Functional Status

                    



                                        No Data Provided for This Section

## 2021-06-07 ENCOUNTER — HOSPITAL ENCOUNTER (OUTPATIENT)
Dept: HOSPITAL 88 - CT | Age: 86
End: 2021-06-07
Attending: INTERNAL MEDICINE
Payer: MEDICARE

## 2021-06-07 DIAGNOSIS — K21.9: ICD-10-CM

## 2021-06-07 DIAGNOSIS — J42: ICD-10-CM

## 2021-06-07 DIAGNOSIS — J98.4: ICD-10-CM

## 2021-06-07 DIAGNOSIS — R06.09: Primary | ICD-10-CM

## 2021-06-07 PROCEDURE — 71250 CT THORAX DX C-: CPT

## 2022-01-07 LAB
ANION GAP SERPL CALC-SCNC: 15.5 MMOL/L (ref 8–16)
BASOPHILS # BLD AUTO: 0 10*3/UL (ref 0–0.1)
BASOPHILS NFR BLD AUTO: 0.3 % (ref 0–1)
BUN SERPL-MCNC: 25 MG/DL (ref 7–26)
BUN/CREAT SERPL: 20 (ref 6–25)
CALCIUM SERPL-MCNC: 9.8 MG/DL (ref 8.4–10.2)
CHLORIDE SERPL-SCNC: 100 MMOL/L (ref 98–107)
CO2 SERPL-SCNC: 25 MMOL/L (ref 22–29)
DEPRECATED NEUTROPHILS # BLD AUTO: 7.6 10*3/UL (ref 2.1–6.9)
EGFRCR SERPLBLD CKD-EPI 2021: 40 ML/MIN (ref 60–?)
EOSINOPHIL # BLD AUTO: 0.1 10*3/UL (ref 0–0.4)
EOSINOPHIL NFR BLD AUTO: 0.8 % (ref 0–6)
ERYTHROCYTE [DISTWIDTH] IN CORD BLOOD: 13.4 % (ref 11.7–14.4)
GLUCOSE SERPLBLD-MCNC: 142 MG/DL (ref 74–118)
HCT VFR BLD AUTO: 34.6 % (ref 34.2–44.1)
HGB BLD-MCNC: 11.6 G/DL (ref 12–16)
LYMPHOCYTES # BLD: 2.1 10*3/UL (ref 1–3.2)
LYMPHOCYTES NFR BLD AUTO: 19.4 % (ref 18–39.1)
MCH RBC QN AUTO: 30.4 PG (ref 28–32)
MCHC RBC AUTO-ENTMCNC: 33.5 G/DL (ref 31–35)
MCV RBC AUTO: 90.8 FL (ref 81–99)
MONOCYTES # BLD AUTO: 0.8 10*3/UL (ref 0.2–0.8)
MONOCYTES NFR BLD AUTO: 7.8 % (ref 4.4–11.3)
NEUTS SEG NFR BLD AUTO: 71.3 % (ref 38.7–80)
PLATELET # BLD AUTO: 316 X10E3/UL (ref 140–360)
POTASSIUM SERPL-SCNC: 4.5 MMOL/L (ref 3.5–5.1)
RBC # BLD AUTO: 3.81 X10E6/UL (ref 3.6–5.1)
SODIUM SERPL-SCNC: 136 MMOL/L (ref 136–145)

## 2022-01-10 ENCOUNTER — HOSPITAL ENCOUNTER (OUTPATIENT)
Dept: HOSPITAL 88 - ENDO | Age: 87
Discharge: HOME | End: 2022-01-10
Attending: INTERNAL MEDICINE
Payer: MEDICARE

## 2022-01-10 VITALS — SYSTOLIC BLOOD PRESSURE: 157 MMHG | DIASTOLIC BLOOD PRESSURE: 68 MMHG

## 2022-01-10 DIAGNOSIS — Z01.810: ICD-10-CM

## 2022-01-10 DIAGNOSIS — I25.10: ICD-10-CM

## 2022-01-10 DIAGNOSIS — Z01.812: ICD-10-CM

## 2022-01-10 DIAGNOSIS — Z79.02: ICD-10-CM

## 2022-01-10 DIAGNOSIS — K21.9: ICD-10-CM

## 2022-01-10 DIAGNOSIS — Z20.822: ICD-10-CM

## 2022-01-10 DIAGNOSIS — I50.9: ICD-10-CM

## 2022-01-10 DIAGNOSIS — M81.0: ICD-10-CM

## 2022-01-10 DIAGNOSIS — R13.10: Primary | ICD-10-CM

## 2022-01-10 DIAGNOSIS — Z80.0: ICD-10-CM

## 2022-01-10 DIAGNOSIS — E78.00: ICD-10-CM

## 2022-01-10 DIAGNOSIS — Z79.899: ICD-10-CM

## 2022-01-10 DIAGNOSIS — Z79.01: ICD-10-CM

## 2022-01-10 DIAGNOSIS — I11.0: ICD-10-CM

## 2022-01-10 DIAGNOSIS — Z95.5: ICD-10-CM

## 2022-01-10 DIAGNOSIS — K31.7: ICD-10-CM

## 2022-01-10 DIAGNOSIS — F41.9: ICD-10-CM

## 2022-01-10 DIAGNOSIS — Z95.0: ICD-10-CM

## 2022-01-10 DIAGNOSIS — I48.91: ICD-10-CM

## 2022-01-10 DIAGNOSIS — K29.50: ICD-10-CM

## 2022-01-10 LAB
ALBUMIN SERPL-MCNC: 3.9 G/DL (ref 3.5–5)
ALBUMIN/GLOB SERPL: 1.1 {RATIO} (ref 0.8–2)
ALP SERPL-CCNC: 58 IU/L (ref 40–150)
ALT SERPL-CCNC: 26 IU/L (ref 0–55)
ANION GAP SERPL CALC-SCNC: 12.4 MMOL/L (ref 8–16)
BUN SERPL-MCNC: 27 MG/DL (ref 7–26)
BUN/CREAT SERPL: 23 (ref 6–25)
CALCIUM SERPL-MCNC: 9.9 MG/DL (ref 8.4–10.2)
CHLORIDE SERPL-SCNC: 103 MMOL/L (ref 98–107)
CO2 SERPL-SCNC: 28 MMOL/L (ref 22–29)
DEPRECATED APTT PLAS QN: 22 SECONDS (ref 23.8–35.5)
DEPRECATED INR PLAS: 0.97
EGFRCR SERPLBLD CKD-EPI 2021: 44 ML/MIN (ref 60–?)
GLOBULIN PLAS-MCNC: 3.7 G/DL (ref 2.3–3.5)
GLUCOSE SERPLBLD-MCNC: 124 MG/DL (ref 74–118)
POTASSIUM SERPL-SCNC: 4.4 MMOL/L (ref 3.5–5.1)
PROTHROMBIN TIME: 13.7 SECONDS (ref 11.9–14.5)
SODIUM SERPL-SCNC: 139 MMOL/L (ref 136–145)

## 2022-01-10 PROCEDURE — 85025 COMPLETE CBC W/AUTO DIFF WBC: CPT

## 2022-01-10 PROCEDURE — 43239 EGD BIOPSY SINGLE/MULTIPLE: CPT

## 2022-01-10 PROCEDURE — 36415 COLL VENOUS BLD VENIPUNCTURE: CPT

## 2022-01-10 PROCEDURE — 80053 COMPREHEN METABOLIC PANEL: CPT

## 2022-01-10 PROCEDURE — 43251 EGD REMOVE LESION SNARE: CPT

## 2022-01-10 PROCEDURE — 80048 BASIC METABOLIC PNL TOTAL CA: CPT

## 2022-01-10 PROCEDURE — 43450 DILATE ESOPHAGUS 1/MULT PASS: CPT

## 2022-01-10 PROCEDURE — 93005 ELECTROCARDIOGRAM TRACING: CPT

## 2022-01-10 PROCEDURE — 85730 THROMBOPLASTIN TIME PARTIAL: CPT

## 2022-01-10 PROCEDURE — 85610 PROTHROMBIN TIME: CPT

## 2022-01-23 ENCOUNTER — HOSPITAL ENCOUNTER (EMERGENCY)
Dept: HOSPITAL 88 - ER | Age: 87
Discharge: HOME | End: 2022-01-23
Payer: MEDICARE

## 2022-01-23 VITALS — HEIGHT: 60 IN | BODY MASS INDEX: 22.19 KG/M2 | WEIGHT: 113 LBS

## 2022-01-23 DIAGNOSIS — F41.9: Primary | ICD-10-CM

## 2022-01-23 DIAGNOSIS — I48.91: ICD-10-CM

## 2022-01-23 DIAGNOSIS — I10: ICD-10-CM

## 2022-01-23 DIAGNOSIS — Z95.5: ICD-10-CM

## 2022-01-23 PROCEDURE — 99282 EMERGENCY DEPT VISIT SF MDM: CPT

## 2022-02-02 ENCOUNTER — HOSPITAL ENCOUNTER (OUTPATIENT)
Dept: HOSPITAL 88 - DX | Age: 87
End: 2022-02-02
Attending: INTERNAL MEDICINE
Payer: MEDICARE

## 2022-02-02 DIAGNOSIS — K21.9: ICD-10-CM

## 2022-02-02 DIAGNOSIS — Z20.822: ICD-10-CM

## 2022-02-02 DIAGNOSIS — R13.10: Primary | ICD-10-CM

## 2022-02-02 PROCEDURE — 92526 ORAL FUNCTION THERAPY: CPT

## 2022-02-02 PROCEDURE — 92611 MOTION FLUOROSCOPY/SWALLOW: CPT

## 2022-02-02 PROCEDURE — 74230 X-RAY XM SWLNG FUNCJ C+: CPT
